# Patient Record
Sex: FEMALE | Race: WHITE | NOT HISPANIC OR LATINO | Employment: OTHER | URBAN - METROPOLITAN AREA
[De-identification: names, ages, dates, MRNs, and addresses within clinical notes are randomized per-mention and may not be internally consistent; named-entity substitution may affect disease eponyms.]

---

## 2017-01-09 ENCOUNTER — AMBULATORY - HEALTHEAST (OUTPATIENT)
Dept: CARDIOLOGY | Facility: CLINIC | Age: 67
End: 2017-01-09

## 2017-01-09 DIAGNOSIS — R40.20 LOSS OF CONSCIOUSNESS (H): ICD-10-CM

## 2017-01-09 DIAGNOSIS — I35.0 AORTIC STENOSIS: ICD-10-CM

## 2017-01-12 ENCOUNTER — COMMUNICATION - HEALTHEAST (OUTPATIENT)
Dept: NEUROSURGERY | Facility: CLINIC | Age: 67
End: 2017-01-12

## 2017-01-13 ENCOUNTER — COMMUNICATION - HEALTHEAST (OUTPATIENT)
Dept: NEUROSURGERY | Facility: CLINIC | Age: 67
End: 2017-01-13

## 2017-01-13 DIAGNOSIS — S32.001S LUMBAR BURST FRACTURE, SEQUELA: ICD-10-CM

## 2017-01-20 ENCOUNTER — RECORDS - HEALTHEAST (OUTPATIENT)
Dept: LAB | Facility: CLINIC | Age: 67
End: 2017-01-20

## 2017-01-23 LAB
CREAT SERPL-MCNC: 0.82 MG/DL (ref 0.6–1.1)
GFR SERPL CREATININE-BSD FRML MDRD: >60 ML/MIN/1.73M2

## 2017-01-24 ENCOUNTER — AMBULATORY - HEALTHEAST (OUTPATIENT)
Dept: NEUROSURGERY | Facility: CLINIC | Age: 67
End: 2017-01-24

## 2017-01-24 ENCOUNTER — HOSPITAL ENCOUNTER (OUTPATIENT)
Dept: RADIOLOGY | Facility: CLINIC | Age: 67
Discharge: HOME | End: 2017-01-24
Attending: NEUROLOGICAL SURGERY

## 2017-01-24 ENCOUNTER — OFFICE VISIT - HEALTHEAST (OUTPATIENT)
Dept: NEUROSURGERY | Facility: CLINIC | Age: 67
End: 2017-01-24

## 2017-01-24 DIAGNOSIS — S32.001S LUMBAR BURST FRACTURE, SEQUELA: ICD-10-CM

## 2017-01-24 DIAGNOSIS — S32.009A FRACTURE OF LUMBAR SPINE (H): ICD-10-CM

## 2017-02-09 ENCOUNTER — AMBULATORY - HEALTHEAST (OUTPATIENT)
Dept: NEUROSURGERY | Facility: CLINIC | Age: 67
End: 2017-02-09

## 2017-02-10 ENCOUNTER — HOSPITAL ENCOUNTER (OUTPATIENT)
Dept: CARDIOLOGY | Facility: CLINIC | Age: 67
Discharge: HOME | End: 2017-02-10
Attending: INTERNAL MEDICINE

## 2017-02-10 DIAGNOSIS — R40.20 LOSS OF CONSCIOUSNESS (H): ICD-10-CM

## 2017-02-10 DIAGNOSIS — I35.0 AORTIC STENOSIS: ICD-10-CM

## 2017-02-28 ENCOUNTER — HOSPITAL ENCOUNTER (OUTPATIENT)
Dept: RADIOLOGY | Facility: CLINIC | Age: 67
Discharge: HOME | End: 2017-02-28

## 2017-02-28 ENCOUNTER — OFFICE VISIT - HEALTHEAST (OUTPATIENT)
Dept: NEUROSURGERY | Facility: CLINIC | Age: 67
End: 2017-02-28

## 2017-02-28 DIAGNOSIS — S32.009A FRACTURE OF LUMBAR SPINE (H): ICD-10-CM

## 2017-02-28 DIAGNOSIS — S32.039A: ICD-10-CM

## 2017-02-28 RX ORDER — ACETAMINOPHEN 500 MG
500 TABLET ORAL EVERY 6 HOURS PRN
Status: SHIPPED | COMMUNITY
Start: 2017-02-28

## 2017-03-06 ENCOUNTER — RECORDS - HEALTHEAST (OUTPATIENT)
Dept: ADMINISTRATIVE | Facility: OTHER | Age: 67
End: 2017-03-06

## 2017-03-13 ENCOUNTER — AMBULATORY - HEALTHEAST (OUTPATIENT)
Dept: NEUROSURGERY | Facility: CLINIC | Age: 67
End: 2017-03-13

## 2017-03-16 ENCOUNTER — RECORDS - HEALTHEAST (OUTPATIENT)
Dept: BONE DENSITY | Facility: CLINIC | Age: 67
End: 2017-03-16

## 2017-03-16 ENCOUNTER — COMMUNICATION - HEALTHEAST (OUTPATIENT)
Dept: EMERGENCY MEDICINE | Facility: HOSPITAL | Age: 67
End: 2017-03-16

## 2017-03-16 DIAGNOSIS — M84.40XA PATHOLOGICAL FRACTURE, UNSPECIFIED SITE, INITIAL ENCOUNTER FOR FRACTURE: ICD-10-CM

## 2017-03-16 DIAGNOSIS — Z78.0 ASYMPTOMATIC MENOPAUSAL STATE: ICD-10-CM

## 2017-03-23 ENCOUNTER — COMMUNICATION - HEALTHEAST (OUTPATIENT)
Dept: NEUROSURGERY | Facility: CLINIC | Age: 67
End: 2017-03-23

## 2017-03-23 DIAGNOSIS — S22.000A THORACIC COMPRESSION FRACTURE, CLOSED, INITIAL ENCOUNTER (H): ICD-10-CM

## 2017-03-23 DIAGNOSIS — S32.001D LUMBAR BURST FRACTURE, WITH ROUTINE HEALING, SUBSEQUENT ENCOUNTER: ICD-10-CM

## 2017-03-24 RX ORDER — OXYCODONE HYDROCHLORIDE 5 MG/1
5 TABLET ORAL 2 TIMES DAILY PRN
Qty: 30 TABLET | Refills: 0 | Status: SHIPPED | OUTPATIENT
Start: 2017-03-24 | End: 2022-05-24

## 2017-03-30 ENCOUNTER — RECORDS - HEALTHEAST (OUTPATIENT)
Dept: ADMINISTRATIVE | Facility: OTHER | Age: 67
End: 2017-03-30

## 2017-04-11 ENCOUNTER — OFFICE VISIT - HEALTHEAST (OUTPATIENT)
Dept: NEUROSURGERY | Facility: CLINIC | Age: 67
End: 2017-04-11

## 2017-04-11 ENCOUNTER — HOSPITAL ENCOUNTER (OUTPATIENT)
Dept: RADIOLOGY | Facility: CLINIC | Age: 67
Discharge: HOME | End: 2017-04-11

## 2017-04-11 ENCOUNTER — AMBULATORY - HEALTHEAST (OUTPATIENT)
Dept: NEUROSURGERY | Facility: CLINIC | Age: 67
End: 2017-04-11

## 2017-04-11 DIAGNOSIS — S32.039A: ICD-10-CM

## 2017-04-13 ENCOUNTER — COMMUNICATION - HEALTHEAST (OUTPATIENT)
Dept: NEUROSURGERY | Facility: CLINIC | Age: 67
End: 2017-04-13

## 2017-04-20 ENCOUNTER — COMMUNICATION - HEALTHEAST (OUTPATIENT)
Dept: NEUROSURGERY | Facility: CLINIC | Age: 67
End: 2017-04-20

## 2017-04-21 ENCOUNTER — AMBULATORY - HEALTHEAST (OUTPATIENT)
Dept: NEUROSURGERY | Facility: CLINIC | Age: 67
End: 2017-04-21

## 2017-05-01 ENCOUNTER — OFFICE VISIT - HEALTHEAST (OUTPATIENT)
Dept: ENDOCRINOLOGY | Facility: CLINIC | Age: 67
End: 2017-05-01

## 2017-05-01 DIAGNOSIS — E11.9 DIABETES (H): ICD-10-CM

## 2017-05-01 LAB
CREAT SERPL-MCNC: 1.08 MG/DL (ref 0.6–1.1)
GFR SERPL CREATININE-BSD FRML MDRD: 51 ML/MIN/1.73M2
HBA1C MFR BLD: 13.2 % (ref 3.5–6)

## 2017-05-01 RX ORDER — ALENDRONATE SODIUM 70 MG/1
70 TABLET ORAL
Qty: 12 TABLET | Refills: 3 | Status: SHIPPED | OUTPATIENT
Start: 2017-05-01 | End: 2023-07-24

## 2017-05-01 ASSESSMENT — MIFFLIN-ST. JEOR: SCORE: 1092.42

## 2017-05-02 ENCOUNTER — COMMUNICATION - HEALTHEAST (OUTPATIENT)
Dept: NEUROSURGERY | Facility: CLINIC | Age: 67
End: 2017-05-02

## 2017-05-03 ENCOUNTER — COMMUNICATION - HEALTHEAST (OUTPATIENT)
Dept: ENDOCRINOLOGY | Facility: CLINIC | Age: 67
End: 2017-05-03

## 2017-05-03 ENCOUNTER — AMBULATORY - HEALTHEAST (OUTPATIENT)
Dept: NEUROSURGERY | Facility: CLINIC | Age: 67
End: 2017-05-03

## 2017-05-03 DIAGNOSIS — E11.9 DIABETES MELLITUS, TYPE II, INSULIN DEPENDENT (H): ICD-10-CM

## 2017-05-03 DIAGNOSIS — E03.9 HYPOTHYROIDISM, UNSPECIFIED: ICD-10-CM

## 2017-05-03 DIAGNOSIS — Z79.4 DIABETES MELLITUS, TYPE II, INSULIN DEPENDENT (H): ICD-10-CM

## 2017-05-03 RX ORDER — LEVOTHYROXINE SODIUM 137 UG/1
137 TABLET ORAL DAILY
Qty: 90 TABLET | Refills: 0 | Status: ON HOLD | OUTPATIENT
Start: 2017-05-03 | End: 2023-01-21

## 2017-05-10 ENCOUNTER — AMBULATORY - HEALTHEAST (OUTPATIENT)
Dept: EDUCATION SERVICES | Facility: CLINIC | Age: 67
End: 2017-05-10

## 2017-05-16 ENCOUNTER — OFFICE VISIT - HEALTHEAST (OUTPATIENT)
Dept: PHYSICAL THERAPY | Facility: CLINIC | Age: 67
End: 2017-05-16

## 2017-05-16 DIAGNOSIS — Z79.4 DIABETES MELLITUS, TYPE II, INSULIN DEPENDENT (H): ICD-10-CM

## 2017-05-16 DIAGNOSIS — S32.001D LUMBAR BURST FRACTURE, WITH ROUTINE HEALING, SUBSEQUENT ENCOUNTER: ICD-10-CM

## 2017-05-16 DIAGNOSIS — E16.2 HYPOGLYCEMIA: ICD-10-CM

## 2017-05-16 DIAGNOSIS — R40.20 LOSS OF CONSCIOUSNESS (H): ICD-10-CM

## 2017-05-16 DIAGNOSIS — M54.9 CHRONIC BACK PAIN GREATER THAN 3 MONTHS DURATION: ICD-10-CM

## 2017-05-16 DIAGNOSIS — N18.30 CHRONIC KIDNEY DISEASE, STAGE III (MODERATE) (H): ICD-10-CM

## 2017-05-16 DIAGNOSIS — E03.9 HYPOTHYROIDISM, UNSPECIFIED: ICD-10-CM

## 2017-05-16 DIAGNOSIS — S22.000S THORACIC COMPRESSION FRACTURE, SEQUELA: ICD-10-CM

## 2017-05-16 DIAGNOSIS — G89.29 CHRONIC BACK PAIN GREATER THAN 3 MONTHS DURATION: ICD-10-CM

## 2017-05-16 DIAGNOSIS — E11.9 DIABETES MELLITUS, TYPE II, INSULIN DEPENDENT (H): ICD-10-CM

## 2017-05-23 ENCOUNTER — OFFICE VISIT - HEALTHEAST (OUTPATIENT)
Dept: PHYSICAL THERAPY | Facility: CLINIC | Age: 67
End: 2017-05-23

## 2017-05-23 DIAGNOSIS — M54.9 CHRONIC BACK PAIN GREATER THAN 3 MONTHS DURATION: ICD-10-CM

## 2017-05-23 DIAGNOSIS — E11.9 DIABETES MELLITUS, TYPE II, INSULIN DEPENDENT (H): ICD-10-CM

## 2017-05-23 DIAGNOSIS — E16.2 HYPOGLYCEMIA: ICD-10-CM

## 2017-05-23 DIAGNOSIS — S22.000S THORACIC COMPRESSION FRACTURE, SEQUELA: ICD-10-CM

## 2017-05-23 DIAGNOSIS — G89.29 CHRONIC BACK PAIN GREATER THAN 3 MONTHS DURATION: ICD-10-CM

## 2017-05-23 DIAGNOSIS — E03.9 HYPOTHYROIDISM, UNSPECIFIED: ICD-10-CM

## 2017-05-23 DIAGNOSIS — Z79.4 DIABETES MELLITUS, TYPE II, INSULIN DEPENDENT (H): ICD-10-CM

## 2017-05-23 DIAGNOSIS — S32.001D LUMBAR BURST FRACTURE, WITH ROUTINE HEALING, SUBSEQUENT ENCOUNTER: ICD-10-CM

## 2017-05-23 DIAGNOSIS — R40.20 LOSS OF CONSCIOUSNESS (H): ICD-10-CM

## 2017-05-23 DIAGNOSIS — N18.30 CHRONIC KIDNEY DISEASE, STAGE III (MODERATE) (H): ICD-10-CM

## 2017-05-30 ENCOUNTER — OFFICE VISIT - HEALTHEAST (OUTPATIENT)
Dept: PHYSICAL THERAPY | Facility: CLINIC | Age: 67
End: 2017-05-30

## 2017-05-30 DIAGNOSIS — S32.001D LUMBAR BURST FRACTURE, WITH ROUTINE HEALING, SUBSEQUENT ENCOUNTER: ICD-10-CM

## 2017-05-30 DIAGNOSIS — Z79.4 DIABETES MELLITUS, TYPE II, INSULIN DEPENDENT (H): ICD-10-CM

## 2017-05-30 DIAGNOSIS — E11.9 DIABETES MELLITUS, TYPE II, INSULIN DEPENDENT (H): ICD-10-CM

## 2017-05-30 DIAGNOSIS — S22.000S THORACIC COMPRESSION FRACTURE, SEQUELA: ICD-10-CM

## 2017-05-30 DIAGNOSIS — M54.9 CHRONIC BACK PAIN GREATER THAN 3 MONTHS DURATION: ICD-10-CM

## 2017-05-30 DIAGNOSIS — E03.9 HYPOTHYROIDISM, UNSPECIFIED: ICD-10-CM

## 2017-05-30 DIAGNOSIS — R40.20 LOSS OF CONSCIOUSNESS (H): ICD-10-CM

## 2017-05-30 DIAGNOSIS — N18.30 CHRONIC KIDNEY DISEASE, STAGE III (MODERATE) (H): ICD-10-CM

## 2017-05-30 DIAGNOSIS — E16.2 HYPOGLYCEMIA: ICD-10-CM

## 2017-05-30 DIAGNOSIS — G89.29 CHRONIC BACK PAIN GREATER THAN 3 MONTHS DURATION: ICD-10-CM

## 2017-06-06 ENCOUNTER — OFFICE VISIT - HEALTHEAST (OUTPATIENT)
Dept: PHYSICAL THERAPY | Facility: CLINIC | Age: 67
End: 2017-06-06

## 2017-06-06 DIAGNOSIS — N18.30 CHRONIC KIDNEY DISEASE, STAGE III (MODERATE) (H): ICD-10-CM

## 2017-06-06 DIAGNOSIS — Z79.4 DIABETES MELLITUS, TYPE II, INSULIN DEPENDENT (H): ICD-10-CM

## 2017-06-06 DIAGNOSIS — E03.9 HYPOTHYROIDISM, UNSPECIFIED: ICD-10-CM

## 2017-06-06 DIAGNOSIS — M54.9 CHRONIC BACK PAIN GREATER THAN 3 MONTHS DURATION: ICD-10-CM

## 2017-06-06 DIAGNOSIS — E16.2 HYPOGLYCEMIA: ICD-10-CM

## 2017-06-06 DIAGNOSIS — S32.001D LUMBAR BURST FRACTURE, WITH ROUTINE HEALING, SUBSEQUENT ENCOUNTER: ICD-10-CM

## 2017-06-06 DIAGNOSIS — R40.20 LOSS OF CONSCIOUSNESS (H): ICD-10-CM

## 2017-06-06 DIAGNOSIS — S22.000S THORACIC COMPRESSION FRACTURE, SEQUELA: ICD-10-CM

## 2017-06-06 DIAGNOSIS — G89.29 CHRONIC BACK PAIN GREATER THAN 3 MONTHS DURATION: ICD-10-CM

## 2017-06-06 DIAGNOSIS — E11.9 DIABETES MELLITUS, TYPE II, INSULIN DEPENDENT (H): ICD-10-CM

## 2017-06-13 ENCOUNTER — OFFICE VISIT - HEALTHEAST (OUTPATIENT)
Dept: PHYSICAL THERAPY | Facility: CLINIC | Age: 67
End: 2017-06-13

## 2017-06-13 DIAGNOSIS — E03.9 HYPOTHYROIDISM, UNSPECIFIED: ICD-10-CM

## 2017-06-13 DIAGNOSIS — S22.000S THORACIC COMPRESSION FRACTURE, SEQUELA: ICD-10-CM

## 2017-06-13 DIAGNOSIS — E16.2 HYPOGLYCEMIA: ICD-10-CM

## 2017-06-13 DIAGNOSIS — N18.30 CHRONIC KIDNEY DISEASE, STAGE III (MODERATE) (H): ICD-10-CM

## 2017-06-13 DIAGNOSIS — R40.20 LOSS OF CONSCIOUSNESS (H): ICD-10-CM

## 2017-06-13 DIAGNOSIS — S32.001D LUMBAR BURST FRACTURE, WITH ROUTINE HEALING, SUBSEQUENT ENCOUNTER: ICD-10-CM

## 2017-06-13 DIAGNOSIS — M54.9 CHRONIC BACK PAIN GREATER THAN 3 MONTHS DURATION: ICD-10-CM

## 2017-06-13 DIAGNOSIS — Z79.4 DIABETES MELLITUS, TYPE II, INSULIN DEPENDENT (H): ICD-10-CM

## 2017-06-13 DIAGNOSIS — E11.9 DIABETES MELLITUS, TYPE II, INSULIN DEPENDENT (H): ICD-10-CM

## 2017-06-13 DIAGNOSIS — G89.29 CHRONIC BACK PAIN GREATER THAN 3 MONTHS DURATION: ICD-10-CM

## 2017-06-27 ENCOUNTER — OFFICE VISIT - HEALTHEAST (OUTPATIENT)
Dept: PHYSICAL THERAPY | Facility: CLINIC | Age: 67
End: 2017-06-27

## 2017-06-27 DIAGNOSIS — E03.9 HYPOTHYROIDISM, UNSPECIFIED: ICD-10-CM

## 2017-06-27 DIAGNOSIS — N18.30 CHRONIC KIDNEY DISEASE, STAGE III (MODERATE) (H): ICD-10-CM

## 2017-06-27 DIAGNOSIS — Z79.4 DIABETES MELLITUS, TYPE II, INSULIN DEPENDENT (H): ICD-10-CM

## 2017-06-27 DIAGNOSIS — R40.20 LOSS OF CONSCIOUSNESS (H): ICD-10-CM

## 2017-06-27 DIAGNOSIS — M54.9 CHRONIC BACK PAIN GREATER THAN 3 MONTHS DURATION: ICD-10-CM

## 2017-06-27 DIAGNOSIS — S22.000S THORACIC COMPRESSION FRACTURE, SEQUELA: ICD-10-CM

## 2017-06-27 DIAGNOSIS — S32.001D LUMBAR BURST FRACTURE, WITH ROUTINE HEALING, SUBSEQUENT ENCOUNTER: ICD-10-CM

## 2017-06-27 DIAGNOSIS — E11.9 DIABETES MELLITUS, TYPE II, INSULIN DEPENDENT (H): ICD-10-CM

## 2017-06-27 DIAGNOSIS — E16.2 HYPOGLYCEMIA: ICD-10-CM

## 2017-06-27 DIAGNOSIS — G89.29 CHRONIC BACK PAIN GREATER THAN 3 MONTHS DURATION: ICD-10-CM

## 2017-07-05 ENCOUNTER — OFFICE VISIT - HEALTHEAST (OUTPATIENT)
Dept: PHYSICAL THERAPY | Facility: REHABILITATION | Age: 67
End: 2017-07-05

## 2017-07-05 DIAGNOSIS — S32.001D LUMBAR BURST FRACTURE, WITH ROUTINE HEALING, SUBSEQUENT ENCOUNTER: ICD-10-CM

## 2017-07-05 DIAGNOSIS — E03.9 HYPOTHYROIDISM, UNSPECIFIED: ICD-10-CM

## 2017-07-05 DIAGNOSIS — Z79.4 DIABETES MELLITUS, TYPE II, INSULIN DEPENDENT (H): ICD-10-CM

## 2017-07-05 DIAGNOSIS — G89.29 CHRONIC BACK PAIN GREATER THAN 3 MONTHS DURATION: ICD-10-CM

## 2017-07-05 DIAGNOSIS — M54.9 CHRONIC BACK PAIN GREATER THAN 3 MONTHS DURATION: ICD-10-CM

## 2017-07-05 DIAGNOSIS — E16.2 HYPOGLYCEMIA: ICD-10-CM

## 2017-07-05 DIAGNOSIS — S22.000S THORACIC COMPRESSION FRACTURE, SEQUELA: ICD-10-CM

## 2017-07-05 DIAGNOSIS — N18.30 CHRONIC KIDNEY DISEASE, STAGE III (MODERATE) (H): ICD-10-CM

## 2017-07-05 DIAGNOSIS — E11.9 DIABETES MELLITUS, TYPE II, INSULIN DEPENDENT (H): ICD-10-CM

## 2017-07-05 DIAGNOSIS — R40.20 LOSS OF CONSCIOUSNESS (H): ICD-10-CM

## 2017-07-12 ENCOUNTER — OFFICE VISIT - HEALTHEAST (OUTPATIENT)
Dept: PHYSICAL THERAPY | Facility: REHABILITATION | Age: 67
End: 2017-07-12

## 2017-07-12 DIAGNOSIS — R40.20 LOSS OF CONSCIOUSNESS (H): ICD-10-CM

## 2017-07-12 DIAGNOSIS — E03.9 HYPOTHYROIDISM, UNSPECIFIED: ICD-10-CM

## 2017-07-12 DIAGNOSIS — E11.9 DIABETES MELLITUS, TYPE II, INSULIN DEPENDENT (H): ICD-10-CM

## 2017-07-12 DIAGNOSIS — M54.9 CHRONIC BACK PAIN GREATER THAN 3 MONTHS DURATION: ICD-10-CM

## 2017-07-12 DIAGNOSIS — Z79.4 DIABETES MELLITUS, TYPE II, INSULIN DEPENDENT (H): ICD-10-CM

## 2017-07-12 DIAGNOSIS — E16.2 HYPOGLYCEMIA: ICD-10-CM

## 2017-07-12 DIAGNOSIS — S22.000S THORACIC COMPRESSION FRACTURE, SEQUELA: ICD-10-CM

## 2017-07-12 DIAGNOSIS — N18.30 CHRONIC KIDNEY DISEASE, STAGE III (MODERATE) (H): ICD-10-CM

## 2017-07-12 DIAGNOSIS — S32.001D LUMBAR BURST FRACTURE, WITH ROUTINE HEALING, SUBSEQUENT ENCOUNTER: ICD-10-CM

## 2017-07-12 DIAGNOSIS — G89.29 CHRONIC BACK PAIN GREATER THAN 3 MONTHS DURATION: ICD-10-CM

## 2017-07-19 ENCOUNTER — OFFICE VISIT - HEALTHEAST (OUTPATIENT)
Dept: PHYSICAL THERAPY | Facility: REHABILITATION | Age: 67
End: 2017-07-19

## 2017-07-19 DIAGNOSIS — R40.20 LOSS OF CONSCIOUSNESS (H): ICD-10-CM

## 2017-07-19 DIAGNOSIS — I10 HYPERTENSION, BENIGN ESSENTIAL, GOAL BELOW 140/90: ICD-10-CM

## 2017-07-19 DIAGNOSIS — Z86.73 HISTORY OF STROKE: ICD-10-CM

## 2017-07-19 DIAGNOSIS — E78.2 MULTIPLE-TYPE HYPERLIPIDEMIA: ICD-10-CM

## 2017-07-19 DIAGNOSIS — M54.9 CHRONIC BACK PAIN GREATER THAN 3 MONTHS DURATION: ICD-10-CM

## 2017-07-19 DIAGNOSIS — E03.9 HYPOTHYROIDISM, UNSPECIFIED: ICD-10-CM

## 2017-07-19 DIAGNOSIS — E16.2 HYPOGLYCEMIA: ICD-10-CM

## 2017-07-19 DIAGNOSIS — I10 ESSENTIAL HYPERTENSION: ICD-10-CM

## 2017-07-19 DIAGNOSIS — N18.30 CHRONIC KIDNEY DISEASE, STAGE III (MODERATE) (H): ICD-10-CM

## 2017-07-19 DIAGNOSIS — G89.29 CHRONIC BACK PAIN GREATER THAN 3 MONTHS DURATION: ICD-10-CM

## 2017-07-19 DIAGNOSIS — E11.9 DIABETES MELLITUS, TYPE II, INSULIN DEPENDENT (H): ICD-10-CM

## 2017-07-19 DIAGNOSIS — F32.A DEPRESSION: ICD-10-CM

## 2017-07-19 DIAGNOSIS — S22.000S THORACIC COMPRESSION FRACTURE, SEQUELA: ICD-10-CM

## 2017-07-19 DIAGNOSIS — S32.001D LUMBAR BURST FRACTURE, WITH ROUTINE HEALING, SUBSEQUENT ENCOUNTER: ICD-10-CM

## 2017-07-19 DIAGNOSIS — Z79.4 DIABETES MELLITUS, TYPE II, INSULIN DEPENDENT (H): ICD-10-CM

## 2017-07-19 DIAGNOSIS — I35.0 AORTIC STENOSIS, MODERATE: ICD-10-CM

## 2017-07-26 ENCOUNTER — OFFICE VISIT - HEALTHEAST (OUTPATIENT)
Dept: PHYSICAL THERAPY | Facility: REHABILITATION | Age: 67
End: 2017-07-26

## 2017-07-26 DIAGNOSIS — I35.0 AORTIC STENOSIS, MODERATE: ICD-10-CM

## 2017-07-26 DIAGNOSIS — F32.A DEPRESSION: ICD-10-CM

## 2017-07-26 DIAGNOSIS — S32.001D LUMBAR BURST FRACTURE, WITH ROUTINE HEALING, SUBSEQUENT ENCOUNTER: ICD-10-CM

## 2017-07-26 DIAGNOSIS — I10 ESSENTIAL HYPERTENSION: ICD-10-CM

## 2017-07-26 DIAGNOSIS — G89.29 CHRONIC BACK PAIN GREATER THAN 3 MONTHS DURATION: ICD-10-CM

## 2017-07-26 DIAGNOSIS — M54.9 CHRONIC BACK PAIN GREATER THAN 3 MONTHS DURATION: ICD-10-CM

## 2017-07-26 DIAGNOSIS — R40.20 LOSS OF CONSCIOUSNESS (H): ICD-10-CM

## 2017-07-26 DIAGNOSIS — E03.9 HYPOTHYROIDISM, UNSPECIFIED: ICD-10-CM

## 2017-07-26 DIAGNOSIS — E11.9 DIABETES MELLITUS, TYPE II, INSULIN DEPENDENT (H): ICD-10-CM

## 2017-07-26 DIAGNOSIS — S22.000S THORACIC COMPRESSION FRACTURE, SEQUELA: ICD-10-CM

## 2017-07-26 DIAGNOSIS — Z79.4 DIABETES MELLITUS, TYPE II, INSULIN DEPENDENT (H): ICD-10-CM

## 2017-07-26 DIAGNOSIS — Z86.73 HISTORY OF STROKE: ICD-10-CM

## 2017-07-26 DIAGNOSIS — N18.30 CHRONIC KIDNEY DISEASE, STAGE III (MODERATE) (H): ICD-10-CM

## 2018-01-06 ENCOUNTER — RECORDS - HEALTHEAST (OUTPATIENT)
Dept: ADMINISTRATIVE | Facility: OTHER | Age: 68
End: 2018-01-06

## 2018-01-09 ENCOUNTER — RECORDS - HEALTHEAST (OUTPATIENT)
Dept: ADMINISTRATIVE | Facility: OTHER | Age: 68
End: 2018-01-09

## 2018-02-06 ENCOUNTER — AMBULATORY - HEALTHEAST (OUTPATIENT)
Dept: CARDIAC REHAB | Facility: HOSPITAL | Age: 68
End: 2018-02-06

## 2018-02-06 DIAGNOSIS — Z95.2 S/P AVR (AORTIC VALVE REPLACEMENT): ICD-10-CM

## 2018-02-19 ENCOUNTER — AMBULATORY - HEALTHEAST (OUTPATIENT)
Dept: CARDIAC REHAB | Facility: HOSPITAL | Age: 68
End: 2018-02-19

## 2018-02-19 DIAGNOSIS — Z95.2 S/P AVR (AORTIC VALVE REPLACEMENT): ICD-10-CM

## 2018-02-19 LAB — GLUCOSE BLDC GLUCOMTR-MCNC: 191 MG/DL

## 2018-02-19 RX ORDER — LIDOCAINE 50 MG/G
1 PATCH TOPICAL EVERY 24 HOURS
Status: SHIPPED | COMMUNITY
Start: 2018-02-19 | End: 2023-01-19

## 2018-02-19 RX ORDER — POLYETHYLENE GLYCOL 3350 17 G/17G
17 POWDER, FOR SOLUTION ORAL DAILY
Status: SHIPPED | COMMUNITY
Start: 2018-02-19 | End: 2023-01-19

## 2018-02-19 RX ORDER — FUROSEMIDE 40 MG
20 TABLET ORAL DAILY
Status: SHIPPED | COMMUNITY
Start: 2018-02-19 | End: 2023-01-27

## 2018-02-19 RX ORDER — METOPROLOL TARTRATE 25 MG/1
25 TABLET, FILM COATED ORAL 2 TIMES DAILY
Status: SHIPPED | COMMUNITY
Start: 2018-02-19 | End: 2023-01-19

## 2018-02-19 RX ORDER — POTASSIUM CHLORIDE 1500 MG/1
20 TABLET, EXTENDED RELEASE ORAL 2 TIMES DAILY
Status: SHIPPED | COMMUNITY
Start: 2018-02-19 | End: 2023-01-19

## 2018-02-19 RX ORDER — ALBUTEROL SULFATE 90 UG/1
2 AEROSOL, METERED RESPIRATORY (INHALATION) EVERY 6 HOURS PRN
Status: SHIPPED | COMMUNITY
Start: 2018-02-19 | End: 2023-07-24

## 2018-02-21 ENCOUNTER — AMBULATORY - HEALTHEAST (OUTPATIENT)
Dept: CARDIAC REHAB | Facility: HOSPITAL | Age: 68
End: 2018-02-21

## 2018-02-21 DIAGNOSIS — Z95.2 S/P AVR (AORTIC VALVE REPLACEMENT): ICD-10-CM

## 2018-02-21 RX ORDER — MIRTAZAPINE 15 MG/1
15 TABLET, FILM COATED ORAL AT BEDTIME
Status: SHIPPED | COMMUNITY
Start: 2018-02-21 | End: 2023-01-19

## 2018-02-26 ENCOUNTER — AMBULATORY - HEALTHEAST (OUTPATIENT)
Dept: CARDIAC REHAB | Facility: HOSPITAL | Age: 68
End: 2018-02-26

## 2018-02-26 DIAGNOSIS — Z95.2 S/P AVR (AORTIC VALVE REPLACEMENT): ICD-10-CM

## 2018-02-26 LAB — GLUCOSE BLDC GLUCOMTR-MCNC: 157 MG/DL

## 2018-02-28 ENCOUNTER — AMBULATORY - HEALTHEAST (OUTPATIENT)
Dept: CARDIAC REHAB | Facility: HOSPITAL | Age: 68
End: 2018-02-28

## 2018-02-28 DIAGNOSIS — Z95.2 S/P AVR (AORTIC VALVE REPLACEMENT): ICD-10-CM

## 2018-03-07 ENCOUNTER — AMBULATORY - HEALTHEAST (OUTPATIENT)
Dept: CARDIAC REHAB | Facility: HOSPITAL | Age: 68
End: 2018-03-07

## 2018-03-07 DIAGNOSIS — Z95.2 S/P AVR (AORTIC VALVE REPLACEMENT): ICD-10-CM

## 2018-03-12 ENCOUNTER — AMBULATORY - HEALTHEAST (OUTPATIENT)
Dept: CARDIAC REHAB | Facility: HOSPITAL | Age: 68
End: 2018-03-12

## 2018-03-12 DIAGNOSIS — Z95.2 S/P AVR (AORTIC VALVE REPLACEMENT): ICD-10-CM

## 2018-03-14 ENCOUNTER — AMBULATORY - HEALTHEAST (OUTPATIENT)
Dept: CARDIAC REHAB | Facility: HOSPITAL | Age: 68
End: 2018-03-14

## 2018-03-14 DIAGNOSIS — Z95.2 S/P AVR (AORTIC VALVE REPLACEMENT): ICD-10-CM

## 2018-03-19 ENCOUNTER — AMBULATORY - HEALTHEAST (OUTPATIENT)
Dept: CARDIAC REHAB | Facility: HOSPITAL | Age: 68
End: 2018-03-19

## 2018-03-19 DIAGNOSIS — Z95.2 S/P AVR (AORTIC VALVE REPLACEMENT): ICD-10-CM

## 2018-03-21 ENCOUNTER — AMBULATORY - HEALTHEAST (OUTPATIENT)
Dept: CARDIAC REHAB | Facility: HOSPITAL | Age: 68
End: 2018-03-21

## 2018-03-21 DIAGNOSIS — Z95.2 S/P AVR (AORTIC VALVE REPLACEMENT): ICD-10-CM

## 2018-03-26 ENCOUNTER — AMBULATORY - HEALTHEAST (OUTPATIENT)
Dept: CARDIAC REHAB | Facility: HOSPITAL | Age: 68
End: 2018-03-26

## 2018-03-26 DIAGNOSIS — Z95.2 S/P AVR (AORTIC VALVE REPLACEMENT): ICD-10-CM

## 2018-03-28 ENCOUNTER — AMBULATORY - HEALTHEAST (OUTPATIENT)
Dept: CARDIAC REHAB | Facility: HOSPITAL | Age: 68
End: 2018-03-28

## 2018-03-28 DIAGNOSIS — Z95.2 S/P AVR (AORTIC VALVE REPLACEMENT): ICD-10-CM

## 2018-04-02 ENCOUNTER — AMBULATORY - HEALTHEAST (OUTPATIENT)
Dept: CARDIAC REHAB | Facility: HOSPITAL | Age: 68
End: 2018-04-02

## 2018-04-02 DIAGNOSIS — Z95.2 S/P AVR (AORTIC VALVE REPLACEMENT): ICD-10-CM

## 2018-04-18 ENCOUNTER — AMBULATORY - HEALTHEAST (OUTPATIENT)
Dept: CARDIAC REHAB | Facility: HOSPITAL | Age: 68
End: 2018-04-18

## 2018-04-18 DIAGNOSIS — Z95.2 S/P AVR (AORTIC VALVE REPLACEMENT): ICD-10-CM

## 2018-04-23 ENCOUNTER — AMBULATORY - HEALTHEAST (OUTPATIENT)
Dept: CARDIAC REHAB | Facility: HOSPITAL | Age: 68
End: 2018-04-23

## 2018-04-23 DIAGNOSIS — Z95.2 S/P AVR (AORTIC VALVE REPLACEMENT): ICD-10-CM

## 2018-04-25 ENCOUNTER — AMBULATORY - HEALTHEAST (OUTPATIENT)
Dept: CARDIAC REHAB | Facility: HOSPITAL | Age: 68
End: 2018-04-25

## 2018-04-25 DIAGNOSIS — Z95.2 S/P AVR (AORTIC VALVE REPLACEMENT): ICD-10-CM

## 2018-04-30 ENCOUNTER — AMBULATORY - HEALTHEAST (OUTPATIENT)
Dept: CARDIAC REHAB | Facility: HOSPITAL | Age: 68
End: 2018-04-30

## 2018-04-30 DIAGNOSIS — Z95.2 S/P AVR (AORTIC VALVE REPLACEMENT): ICD-10-CM

## 2018-05-02 ENCOUNTER — AMBULATORY - HEALTHEAST (OUTPATIENT)
Dept: CARDIAC REHAB | Facility: HOSPITAL | Age: 68
End: 2018-05-02

## 2018-05-02 DIAGNOSIS — Z95.2 S/P AVR (AORTIC VALVE REPLACEMENT): ICD-10-CM

## 2018-05-07 ENCOUNTER — AMBULATORY - HEALTHEAST (OUTPATIENT)
Dept: CARDIAC REHAB | Facility: HOSPITAL | Age: 68
End: 2018-05-07

## 2018-05-07 DIAGNOSIS — Z95.2 S/P AVR (AORTIC VALVE REPLACEMENT): ICD-10-CM

## 2018-05-09 ENCOUNTER — HOSPITAL ENCOUNTER (OUTPATIENT)
Dept: MAMMOGRAPHY | Facility: CLINIC | Age: 68
Discharge: HOME | End: 2018-05-09

## 2018-05-09 ENCOUNTER — AMBULATORY - HEALTHEAST (OUTPATIENT)
Dept: CARDIAC REHAB | Facility: HOSPITAL | Age: 68
End: 2018-05-09

## 2018-05-09 DIAGNOSIS — Z12.31 VISIT FOR SCREENING MAMMOGRAM: ICD-10-CM

## 2018-05-09 DIAGNOSIS — Z95.2 S/P AVR (AORTIC VALVE REPLACEMENT): ICD-10-CM

## 2020-11-19 ENCOUNTER — COMMUNICATION - HEALTHEAST (OUTPATIENT)
Dept: SCHEDULING | Facility: CLINIC | Age: 70
End: 2020-11-19

## 2021-05-13 ENCOUNTER — HOSPITAL ENCOUNTER (OUTPATIENT)
Dept: MAMMOGRAPHY | Facility: CLINIC | Age: 71
Discharge: HOME | End: 2021-05-13

## 2021-05-13 DIAGNOSIS — Z12.31 VISIT FOR SCREENING MAMMOGRAM: ICD-10-CM

## 2021-05-26 ENCOUNTER — RECORDS - HEALTHEAST (OUTPATIENT)
Dept: ADMINISTRATIVE | Facility: CLINIC | Age: 71
End: 2021-05-26

## 2021-05-28 ENCOUNTER — RECORDS - HEALTHEAST (OUTPATIENT)
Dept: ADMINISTRATIVE | Facility: CLINIC | Age: 71
End: 2021-05-28

## 2021-05-29 ENCOUNTER — RECORDS - HEALTHEAST (OUTPATIENT)
Dept: ADMINISTRATIVE | Facility: CLINIC | Age: 71
End: 2021-05-29

## 2021-05-30 ENCOUNTER — RECORDS - HEALTHEAST (OUTPATIENT)
Dept: ADMINISTRATIVE | Facility: CLINIC | Age: 71
End: 2021-05-30

## 2021-05-30 VITALS — WEIGHT: 140 LBS | HEIGHT: 61 IN | BODY MASS INDEX: 26.43 KG/M2

## 2021-05-31 ENCOUNTER — RECORDS - HEALTHEAST (OUTPATIENT)
Dept: ADMINISTRATIVE | Facility: CLINIC | Age: 71
End: 2021-05-31

## 2021-06-01 ENCOUNTER — RECORDS - HEALTHEAST (OUTPATIENT)
Dept: ADMINISTRATIVE | Facility: CLINIC | Age: 71
End: 2021-06-01

## 2021-06-01 VITALS — WEIGHT: 152 LBS | BODY MASS INDEX: 28.72 KG/M2

## 2021-06-01 VITALS — BODY MASS INDEX: 29.66 KG/M2 | WEIGHT: 157 LBS

## 2021-06-01 VITALS — BODY MASS INDEX: 29.1 KG/M2 | WEIGHT: 154 LBS

## 2021-06-01 VITALS — BODY MASS INDEX: 29.48 KG/M2 | WEIGHT: 156 LBS

## 2021-06-01 VITALS — BODY MASS INDEX: 29.85 KG/M2 | WEIGHT: 158 LBS

## 2021-06-01 VITALS — BODY MASS INDEX: 29.29 KG/M2 | WEIGHT: 155 LBS

## 2021-06-01 VITALS — BODY MASS INDEX: 28.91 KG/M2 | WEIGHT: 153 LBS

## 2021-06-01 VITALS — WEIGHT: 151 LBS | BODY MASS INDEX: 28.53 KG/M2

## 2021-06-01 VITALS — WEIGHT: 154 LBS | BODY MASS INDEX: 29.1 KG/M2

## 2021-06-01 VITALS — BODY MASS INDEX: 28.53 KG/M2 | WEIGHT: 151 LBS

## 2021-06-02 ENCOUNTER — RECORDS - HEALTHEAST (OUTPATIENT)
Dept: ADMINISTRATIVE | Facility: CLINIC | Age: 71
End: 2021-06-02

## 2021-06-03 ENCOUNTER — RECORDS - HEALTHEAST (OUTPATIENT)
Dept: ADMINISTRATIVE | Facility: CLINIC | Age: 71
End: 2021-06-03
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
## 2021-06-10 NOTE — PROGRESS NOTES
[facility] Daily Progress     Patient Name: Nichole Titus  Date: 5/23/2017  Visit #: 2/12  Referral Diagnosis: Fracture of L3 Vertebra  Referring provider: Kay Garcia CNP  Visit Diagnosis:     ICD-10-CM    1. Lumbar burst fracture, with routine healing, subsequent encounter S32.001D    2. Thoracic compression fracture, sequela S22.000S    3. Diabetes mellitus, type II, insulin dependent E11.9     Z79.4    4. Hypothyroidism, unspecified E03.9    5. Chronic kidney disease, stage III (moderate) N18.3    6. Loss of consciousness due to hypoglycemia (glucose 46 per EMT) R40.20    7. Chronic back pain greater than 3 months duration M54.9     G89.29    8. Hypoglycemia E16.2          Assessment:     Pt overall worse this past week and is unable as a diabetic and having steroid allergies to use Prednisone. I have asked her to decrease rib motion during breathing exercises and to talk with doctor about an antiinflammatory.  Based on history, the most likely therapy diagnosis is related to left hip muscle and bursal pain, especially given the fact she has had no injury and has a multilevel home. She also seems to be very fluid fiilled in her chest and abdomen and I have some concern for pneumonia, which she has had in the past and she made a statement this is what it feels like to her.    Goal Status:  Pt. will have improved quality of sleep: waking less times/night;in 12 weeks;Comment  Comment:: waking 3x.night or less  Pt. will be able to walk : 30 minutes;for exercise/recreation;in 12 weeks;Comment  Comment:: with less than 5/10 back pain  Patient will sit: 30 minutes;for driving;for other activity;in 12 weeks;Comment  Other Activity: with less than 5/10 back pain  Patient will transfer: sit/stand;supine/sit;in 12 weeks;Comment  Comment: with less than 5/10 back pain    Pain is a 10/10 in her ribs and abdomen, and left hip and back- no falls    Plan / Patient Education:     Pt reports if she doesn't get  relief she feels she may have to go into the ER, and this is a good option in her current condition.  We will continue t work on transfers, self management , exercises, and manual therapy    Subjective:     Pain Rating: 10       I saw the doctor yesterday, and they took a lot of xray's of the LB and left hip. I hurt so much I can't sleep, sit, stand.    I have been having trouble breathing for the past week, and pain radiates to the chest . I have to use a pillow to compress my chest when I move. Now I have pain in both buttocks which goes down left posterior thigh to the knee and is intermittent pain not tingling.  Muscle relaxants( Flexoril) didn't work, and now they have me on Gabapentin( 100 mg- 3x/day) and Oxycodone.    I don't even clean my house, I am not lifting more than 5#.        Objective:   PSIS level in supine, but much pelvic area swelling.  Marked left hip pain with even supine hip flexion to 90.  No increased pain with pelvic compression or distraction        Treatment Today     TREATMENT MINUTES COMMENTS   Evaluation     Self-care/ Home management 40 Discussed her current condition and modifications: trying to conserve energy and avoid stairs, how to use hands to roll and avoid any trunk rotation moving from sit to stand (this is extremely painful), discussing changing diaphragm exercise to decrease rib excursion and change to 2 reps, 3x/day. Did express concern for signs of difficulty breathing and to talk with her doctor about this, as well as possibly antiinflammatories to manage pain level. We did also talk about positioning to 90/90 off piriformis and gluteals, and relaxing muscles when supported to allow better self healing and circulation.   Manual therapy 15 Andrea DEC-LV- very extended   Neuromuscular Re-education     Therapeutic Activity     Therapeutic Exercises     Gait training     Modality__________________                Total 60    Blank areas are intentional and mean the treatment  did not include these items.       Cristina Almaraz  5/23/2017
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
## 2021-06-10 NOTE — PROGRESS NOTES
[facility] Daily Progress     Patient Name: Nichole Titus  Date: 2017  Visit #: 3/12  Referral Diagnosis: Fracture of L3 Vertebra  Referring provider: Velvet Mckay MD  Visit Diagnosis:     ICD-10-CM    1. Lumbar burst fracture, with routine healing, subsequent encounter S32.001D    2. Thoracic compression fracture, sequela S22.000S    3. Diabetes mellitus, type II, insulin dependent E11.9     Z79.4    4. Hypothyroidism, unspecified E03.9    5. Chronic kidney disease, stage III (moderate) N18.3    6. Loss of consciousness due to hypoglycemia (glucose 46 per EMT) R40.20    7. Chronic back pain greater than 3 months duration M54.9     G89.29    8. Hypoglycemia E16.2          Assessment:     Significant pain in left back, pelvis and into left LE but per patient all films show good healing ob her bone including the fracture. Transitions from different levels are the most painful, but overall patient reports she is moving a little better than last week. She is noted to be moving faster and with less guarding sit to stand and when walking.    Goal Status:  Pt. will have improved quality of sleep: waking less times/night;in 12 weeks;Comment  Comment:: waking 3x.night or less  Pt. will be able to walk : 30 minutes;for exercise/recreation;in 12 weeks;Comment  Comment:: with less than 5/10 back pain  Patient will sit: 30 minutes;for driving;for other activity;in 12 weeks;Comment  Other Activity: with less than 5/10 back pain  Patient will transfer: sit/stand;supine/sit;in 12 weeks;Comment  Comment: with less than 5/10 back pain     10/10 when transferring from bed in side lie to upright      Plan / Patient Education:   Try TENS unit, continue MT/SCS  Continue with initial plan of care.  Progress with home program as tolerated.    Subjective:     Pain Ratin  My pain got so bad I had to go into the ER.17 and stayed overnight. I received 2 doses of morphine via IV, and I got down to 6/10. I am still on  Gabapentin 100mg, and I am off Oxycodone.  I saw the specialist, and talked about the possibility of spine injections.      Objective:     Pain radiates down left LB, left gluteal, left groin and left lateral thigh. She reports she can get tingling down the outer calf into the foot in certain positions - even when laying down.   Left pelvis /lower abdomen and left lateral thigh feels mild edema.    Treatment Today     TREATMENT MINUTES COMMENTS   Evaluation     Self-care/ Home management     Manual therapy 40 Right side Lie MT/SCS to left QL, thoracolumbar paraspinal, left gluteals, and left lat thigh. ILLU-A, DCIL-A, RAD-A multiple lumbar levels, SGL5, SGL4-A, EPIS-LV 1-12. EPIL-LV, 3-4, IIL-LV   Neuromuscular Re-education     Therapeutic Activity     Therapeutic Exercises 15 Instructed patient in 4-7-8 breathing, and she is to continue light walking and exercises already issued. She has not yet done pressups since getting out of hospital   Gait training     Modality__________________                Total 60    Blank areas are intentional and mean the treatment did not include these items.       Cristina Almaraz  5/30/2017

## 2021-06-11 NOTE — PROGRESS NOTES
[facility] Daily Progress     Patient Name: Nichole Titus  Date: 2017  Visit #:   Referral Diagnosis: Fracture of L3 Vertebra  Referring provider: Velvet Mckay MD  Visit Diagnosis:     ICD-10-CM    1. Lumbar burst fracture, with routine healing, subsequent encounter S32.001D    2. Thoracic compression fracture, sequela S22.000S    3. Diabetes mellitus, type II, insulin dependent E11.9     Z79.4    4. Hypothyroidism, unspecified E03.9    5. Chronic kidney disease, stage III (moderate) N18.3    6. Loss of consciousness due to hypoglycemia (glucose 46 per EMT) R40.20    7. Chronic back pain greater than 3 months duration M54.9     G89.29    8. Hypoglycemia E16.2          Assessment:     Pt was able to lay prone with one pillow for today's treatment and this allowed the least pain during plinth to stand transfers (pain went down with rx to 8/10 and stayed at 8/10). My suspicion is that her fascial tissues are causing a lot of her pain in the lumbosacral region.    Goal Status:  Pt. will have improved quality of sleep: waking less times/night;in 12 weeks;Comment  Comment:: waking 3x.night or less  Pt. will be able to walk : 30 minutes;for exercise/recreation;in 12 weeks;Comment  Comment:: with less than 5/10 back pain  Patient will sit: 30 minutes;for driving;for other activity;in 12 weeks;Comment  Other Activity: with less than 5/10 back pain  Patient will transfer: sit/stand;supine/sit;in 12 weeks;Comment  Comment: with less than 5/10 back pain    Pt is walking around as able, doing frequent sit to stand, and doing her breathing exercises.    Plan / Patient Education:   Continue prone MT, consider no pillow next session- prone lie HEP?  Continue with initial plan of care.  Progress with home program as tolerated.    Subjective:     Pain Ratin  I was up and down and moving around    Objective:     PSIS level  Extreme lack of thoracic and lumbar PA mobility throughout the spine bilat  Mild thoracic  kyphosis and no arm swing when walking    Treatment Today     TREATMENT MINUTES COMMENTS   Evaluation     Self-care/ Home management     Manual therapy 55 Prone MT/SCS to thoracolumbar area- HAXY, AZY-LV, RAD-A stacked bilat, EPIS1-4 bilat Right DFEM-A, ILLU-A bilat   Neuromuscular Re-education     Therapeutic Activity     Therapeutic Exercises Nc-5 Added shoulder reach bilat to celing several times per day for trunk SB and rib expansion   Gait training     Modality__________________                Total 60    Blank areas are intentional and mean the treatment did not include these items.       Cristina Almaraz  6/6/2017

## 2021-06-11 NOTE — PROGRESS NOTES
[facility] Daily Progress     Patient Name: Nichole Titus  Date: 7/19/2017  Visit #: 9/12  Referral Diagnosis: Fracture of L3 Vertebra, left rib fractures  Referring provider: Melvina Garcia CNP  Visit Diagnosis:     ICD-10-CM    1. Lumbar burst fracture, with routine healing, subsequent encounter S32.001D    2. Thoracic compression fracture, sequela S22.000S    3. Diabetes mellitus, type II, insulin dependent E11.9     Z79.4    4. Hypothyroidism, unspecified E03.9    5. Chronic kidney disease, stage III (moderate) N18.3    6. Loss of consciousness due to hypoglycemia (glucose 46 per EMT) R40.20    7. Chronic back pain greater than 3 months duration M54.9     G89.29    8. Hypoglycemia E16.2          Assessment:   Patient is making progress with bed mobility and gait, but still has significant back pain affecting ADL's. PT has been helpful, but our new plan discussed today is to see patient every 3-4 weeks over the summer in order to modify and progress her program and assess functional progress.  Patient demonstrates understanding/independence with home program.  Patient is appropriate to continue with skilled physical therapy intervention, as indicated by initial plan of care.    Goal Status:  Pt. will have improved quality of sleep: waking less times/night;in 12 weeks;Comment  Comment:: waking 3x.night or less  Pt. will be able to walk : 30 minutes;for exercise/recreation;in 12 weeks;Comment  Comment:: with less than 5/10 back pain  Patient will sit: 30 minutes;for driving;for other activity;in 12 weeks;Comment  Other Activity: with less than 5/10 back pain  Patient will transfer: sit/stand;supine/sit;in 12 weeks;Comment  Comment: with less than 5/10 back pain      Plan / Patient Education:    See patient next week to issue quad and hip abd strengthening, and then decrease frequency to every 3-4 weeks.  Continue with initial plan of care.  Progress with home program as tolerated.    Subjective:      Pain Ratin  I had an echocardiogram done in  and they want me to come back due to an abnormality - I have a heart murmur.  Back pain is slightly variable, but the lowest it goes is a 7/10 some days      Objective:     Walking is extremely slow and guarded, definite right leg limp but walks 100 feet without device. She is wearing the back brace, and does not wear the brace in the house.    Pt has difficulty rolling and getting onto her stomach at home, so prone exercises are not yet possible.    Exercises modified due to right upper arm and shoulder pain. This pain has been present about weeks, and is most painful with reaching.    Reviewed orange band rows and shoulder ER to 90, and patient had been doing ER while facing door essentially in full ER/slight ABD which likely caused the right shoulder pain due to RC  Impingement. Technique corrected    Instructed patient in bridges and patient is only able to clear gluts to S# level, Abdominal sets are poor overall and with bent leg raise she has difficulty stabilizing pelvis              Treatment Today     TREATMENT MINUTES COMMENTS   Evaluation     Self-care/ Home management     Manual therapy     Neuromuscular Re-education     Therapeutic Activity     Therapeutic Exercises 60 Reviewed all exercises issued , corrected technique when needed, and added bridging and abdominal sets   Gait training     Modality__________________                Total 60    Blank areas are intentional and mean the treatment did not include these items.       Cristina Almaraz  2017

## 2021-06-11 NOTE — PROGRESS NOTES
[facility] Daily Progress Monthly Summary    Patient Name: Nichole Titus  Date: 2017  Visit #:   Referral Diagnosis: Fracture of L3 Vertebra, left rib fractures  Referring provider: Velvet Mckay MD  Visit Diagnosis:     ICD-10-CM    1. Lumbar burst fracture, with routine healing, subsequent encounter S32.001D    2. Thoracic compression fracture, sequela S22.000S    3. Diabetes mellitus, type II, insulin dependent E11.9     Z79.4    4. Hypothyroidism, unspecified E03.9    5. Chronic kidney disease, stage III (moderate) N18.3    6. Loss of consciousness due to hypoglycemia (glucose 46 per EMT) R40.20    7. Chronic back pain greater than 3 months duration M54.9     G89.29    8. Hypoglycemia E16.2          Assessment:   Overall patient moving quicker, but is still on pain meds and still hasfunctional pain disability.  Patient demonstrates understanding/independence with home program.  Patient is benefitting from skilled physical therapy and is making steady progress toward functional goals.    Goal Status:  Pt. will have improved quality of sleep: waking less times/night;in 12 weeks;Comment  Comment:: waking 3x.night or less  Pt. will be able to walk : 30 minutes;for exercise/recreation;in 12 weeks;Comment  Comment:: with less than 5/10 back pain  Patient will sit: 30 minutes;for driving;for other activity;in 12 weeks;Comment  Other Activity: with less than 5/10 back pain  Patient will transfer: sit/stand;supine/sit;in 12 weeks;Comment  Comment: with less than 5/10 back pain    Pain is still 8-101/10 with all above goals  Patient able to sleep later/longer but still wakes hourly    Plan / Patient Education:   Continue fascial work on anterior and posterior neural fascia of ribs 8-12, diaphragm viscera  Continue with initial plan of care.  Progress with home program as tolerated.    Subjective:     Pain Ratin  Sitting is the hardest for me, and my dad fell (dad lives with her).  Patient states her  left buttock has been sore lately.  Pain is constantly present       Objective:     Patient able to transfer sit to stand and walk more quickly.  Pt still wearing lumbar corset  Trunk flexion is 24 inches fingertips to the floor  Trunk is nearly erect today, but slightly side bent left  Patient is able to lay down on plinth much easier, and tolerates supine position now    Treatment Today     TREATMENT MINUTES COMMENTS   Evaluation     Self-care/ Home management     Manual therapy 55 Supine MT/SCS to Bilat MR-V, IVC-V, LOT-V, Bilat MUL-V, PYL-V, LFT9-10-M, LF C4 right-M, ABDA-A   Neuromuscular Re-education     Therapeutic Activity     Therapeutic Exercises     Gait training     Modality__________________                Total 60    Blank areas are intentional and mean the treatment did not include these items.       Cristina Almaraz  7/12/2017

## 2021-06-11 NOTE — PROGRESS NOTES
[facility] Daily Progress     Patient Name: Nichole Titus  Date: 7/5/2017  Visit #: 7/12  Referral Diagnosis: Fracture of L3 Vertebra, left rib fractures  Referring provider: Velvet Mckay MD  Visit Diagnosis:     ICD-10-CM    1. Lumbar burst fracture, with routine healing, subsequent encounter S32.001D    2. Thoracic compression fracture, sequela S22.000S    3. Diabetes mellitus, type II, insulin dependent E11.9     Z79.4    4. Hypothyroidism, unspecified E03.9    5. Chronic kidney disease, stage III (moderate) N18.3    6. Loss of consciousness due to hypoglycemia (glucose 46 per EMT) R40.20    7. Chronic back pain greater than 3 months duration M54.9     G89.29    8. Hypoglycemia E16.2          Assessment:     Patient reports she still has a very high level of back pain even at rest- 8/10-9/10. With ADL's she is 9-10/0 even with meds and PT. Patient has been encouraged to continue pursuing referrals for alternative methods of pain relief with her MD. Some of her back pain may indeed be visceral fascial referral as she was very painful to palpate tender points as noted in treatment done today, and these do refer to the back and abdomen.    Goal Status:  Pt. will have improved quality of sleep: waking less times/night;in 12 weeks;Comment  Comment:: waking 3x.night or less  Pt. will be able to walk : 30 minutes;for exercise/recreation;in 12 weeks;Comment  Comment:: with less than 5/10 back pain  Patient will sit: 30 minutes;for driving;for other activity;in 12 weeks;Comment  Other Activity: with less than 5/10 back pain  Patient will transfer: sit/stand;supine/sit;in 12 weeks;Comment  Comment: with less than 5/10 back pain    Pain still severe in back and radiating to abdomen    Plan / Patient Education:     Continue with initial plan of care.   Continue arterial and visceral fascial releases to decrease back pain  Patient has trunk flexibility and strength home exercises as appropriate for her current pain  level    Subjective:     Pain Rating: 10  I saw my doctor 7/3 and because I am on Oxycodone, they are considering alternate methods of care including Pain Clinic.     The pain has been over a 10/10 the past 3 days, and I could hardly walk. I have had pain wrapping around from the back and going into my stomach and both groins. I had a burning pain in the middle of my back last night.     Patient had questions regarding her pain, and could this pain be coming from something they haven't looked at. Last xrays of the affected were taken in April- and some healing was noted.    Patient is walking 20 minutes- back pain 10/10      Objective:     Added core stability exercises by using theraband shoulder exercises. Back pain occurred with shoulder shoulder flexion and we chose not to do this one. Patient currently does not do independent shopping- daughter does this with her as she does not have a car, and she needs help lifting and carrying groceries.    Trunk flexion is 23 inches fingertips to the floor- 10/10  OWT is 7 cm    Treatment Today     TREATMENT MINUTES COMMENTS   Evaluation     Self-care/ Home management     Manual therapy 25 Supine MT/SCS to Bilat LION-LV, Left IJ and PAR-LV, ABDA-A, Bilat INOM-LV   Neuromuscular Re-education     Therapeutic Activity     Therapeutic Exercises 30 Added shoulder band exercises, issued orange band, for core stability   Gait training     Modality__________________                Total 60    Blank areas are intentional and mean the treatment did not include these items.       Cristina Almaraz  7/5/2017
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
## 2021-06-12 NOTE — PROGRESS NOTES
[facility] Daily Progress     Patient Name: Nichole Titus  Date: 7/26/2017  Visit #: 10/12  Referral Diagnosis: Fracture of L3 Vertebra, left rib fractures  Referring provider: Melvina Garcia CNP  Visit Diagnosis:   Visit Diagnosis:     ICD-10-CM    1. Lumbar burst fracture, with routine healing, subsequent encounter S32.001D    2. Thoracic compression fracture, sequela S22.000S    3. Diabetes mellitus, type II, insulin dependent E11.9     Z79.4    4. Hypothyroidism, unspecified E03.9    5. Chronic kidney disease, stage III (moderate) N18.3    6. Loss of consciousness due to hypoglycemia (glucose 46 per EMT) R40.20    7. Chronic back pain greater than 3 months duration M54.9     G89.29    8. Aortic stenosis, moderate I35.0    9. Depression F32.9    10. Essential hypertension I10    11. History of stroke Z86.73          Assessment:     Patient has not met goals and in fact has increased back pain without reason over the past 3 days. She has plateaued, and we have decided to have patient continue with her exercises independently until after seeing a spine physician on August 19th for consult.  She is having shoulder pain more on the right side and I suspect it is a rotator cuff weakness and tendonitis from increased use of arms for transfers. She does have strengthening exercises to promote improved shoulder and rotator cuff strength.    Goal Status:  Pt. will have improved quality of sleep: waking less times/night;in 12 weeks;Comment  Comment:: waking 3x.night or less  Pt. will be able to walk : 30 minutes;for exercise/recreation;in 12 weeks;Comment  Comment:: with less than 5/10 back pain  Patient will sit: 30 minutes;for driving;for other activity;in 12 weeks;Comment  Other Activity: with less than 5/10 back pain  Patient will transfer: sit/stand;supine/sit;in 12 weeks;Comment  Comment: with less than 5/10 back pain     All goals not met, and patient today reports lumbar pain is 10/10 with radiation  to bilateral SI area. No LE numbness or tingling      Plan / Patient Education:     Patient will continue her home exercises as instructed and wait to see what spine consult recommends on August 19 th.    Subjective:     Pain Rating: 10 - Low back, Hard to take a deep breath due to pain in lower rib area  The last few days I have been in more pain.  I went to another doctor who recommended I go to a spine specialist appointment and this was set up August 16th, at [facility] ( has [id number]).  MRI of back was 6 months ago ( January 2017), and last Xray of spine is April 2017    My right shoulder hurts with pain down right outer arm during use of arm to push self during transfers.      Objective:     PSIS level in standing and standing  Gastroc strength: unable to do even 1 rep SL heel raise, 3/5  Palpation of low back at about L1 and patient had severe pain and flinching    Sit to stand from 20 inches is difficult due to leg weakness and patient falls back into chair making this an unsafe exercise to perform    Treatment Today     TREATMENT MINUTES COMMENTS   Evaluation     Self-care/ Home management     Manual therapy     Neuromuscular Re-education     Therapeutic Activity     Therapeutic Exercises 55 Reviewed band shoulder exercises and patient is doing correctly, increased heel raises to 3x/day, added quad strengthening in both seated and 30 degrees of flexion   Gait training     Modality__________________                Total 60    Blank areas are intentional and mean the treatment did not include these items.       Cristina Almaraz  7/26/2017

## 2021-06-12 NOTE — PROGRESS NOTES
[facility] Discharge Summary  Patient Name: Nichole Titus  Date: 8/21/2017  Referral Diagnosis:  Fracture of L3 Vertebra, left rib fractures  Referring provider:  Melvina Garcia CNP  Visit Diagnosis:   1. Lumbar burst fracture, with routine healing, subsequent encounter     2. Thoracic compression fracture, sequela     3. Diabetes mellitus, type II, insulin dependent     4. Hypothyroidism, unspecified     5. Chronic kidney disease, stage III (moderate)     6. Loss of consciousness due to hypoglycemia (glucose 46 per EMT)     7. Chronic back pain greater than 3 months duration     8. Aortic stenosis, moderate     9. Depression     10. Essential hypertension     11. History of stroke         Goals:    Pt. will have improved quality of sleep: waking less times/night;in 12 weeks;Comment  Comment:: waking 3x.night or less  Pt. will be able to walk : 30 minutes;for exercise/recreation;in 12 weeks;Comment  Comment:: with less than 5/10 back pain  Patient will sit: 30 minutes;for driving;for other activity;in 12 weeks;Comment  Other Activity: with less than 5/10 back pain  Patient will transfer: sit/stand;supine/sit;in 12 weeks;Comment  Comment: with less than 5/10 back pain    Above goals not met with LBP radiating to SI and 10/10 today. Patient denies any LE tingling or numbness.    On her last visit 7/26 patient reported she has had increased back and rib pain over the previous 3 days. [place] decision was made to have patient continue her exercises on her own and hold on any further PT, pending a Spine care consult on 8/19/17. No information on that visit is available in [facility], and patient has not called to schedule further PT so it will be assumed she has not been referred back to PT or has gone elsewhere for care.    Patient was seen for 10 visits from 5/1617 to 7/26/17 with no missed appointments.  The patient was instructed to follow up with physician's clinic.  Patient received a home program trunk  flexibility and strength,and right shoulder strengthening due to pain    Therapy will be discontinued at this time.  The patient will need a new referral to resume.    Thank you for your referral.  Cristina Almaraz  8/21/2017  9:30 AM

## 2021-06-13 NOTE — TELEPHONE ENCOUNTER
Patient calling. She states she  Has had a headache for the past 2 days.    After patient asked about symptoms, she chamnged her mind about getting covid tested, and hung up.    Joanna Morales RN  [facility] Triage/refill nurse

## 2021-06-15 PROBLEM — Z86.73 HISTORY OF STROKE: Chronic | Status: ACTIVE | Noted: 2017-01-06

## 2021-06-15 PROBLEM — R63.4 WEIGHT LOSS: Status: ACTIVE | Noted: 2017-01-06

## 2021-06-15 PROBLEM — R40.20 LOSS OF CONSCIOUSNESS (H): Status: ACTIVE | Noted: 2017-01-06

## 2021-06-15 PROBLEM — I35.0 AORTIC STENOSIS, MODERATE: Chronic | Status: ACTIVE | Noted: 2017-01-06

## 2021-06-15 PROBLEM — S32.001D: Status: ACTIVE | Noted: 2017-01-06

## 2021-06-15 PROBLEM — S22.000A THORACIC COMPRESSION FRACTURE, CLOSED, INITIAL ENCOUNTER (H): Status: ACTIVE | Noted: 2017-01-06

## 2021-06-16 NOTE — PROGRESS NOTES
ITP ASSESSMENT   Assessment Day: Initial  Session Number: 1  Precautions: Sternal, fractures in back  Diagnosis: Valve  Risk Stratification: High  Referring Provider: Barak Hannah Pe*  EXERCISE  Exercise Assessment: Initial   Tolerated 10' on Nustep at 2.2 METs. Limited by back pain                         Exercise Plan  Goals Next 30 days  ADL'S: Resume word puzzles  Leisure: Walk 3x/day   Work: Retired    Education Goals: All goals in this section met  Education Goals Met: Patient can state cardiac s/s and appropriate emergency response.;Has system for taking medication.;Medication review.    Exercise Prescription  Exercise Mode: Treadmill;Bike;Nustep;Arm Erg.;Hallway Walking  Frequency: 2x/week  Duration: 40'  Intensity / THR: 20-30 beats above resting heart rate  RPE 11-14  Progression / Met level: 2.2-2.7  Resistive Training?: Yes    Current Exercise (mins/week): 1    Interventions  Home Exercise:  Mode: Walk  Frequency: 3x/day  Duration: 5 min    Education Material : Educational videos;Provide written material;Individual education and counseling;Offer educational classes    Education Completed  Exercise Education Completed: Cardiac Anatomy;Signs and Symptoms;Medication review;RPE;Emergency Plan;Warm up/cool down;Home Exercise;FITT Principles;BP/HR Reponse to exercise;Benefits of Exercise;End point of exercise            Exercise Follow-up/Discharge  Follow up/Discharge: Pts walking is limited by chronic low back pain. Encouraged pt to take several short walks throughout the day every day as tolerates NUTRITION  Nutrition Assessment: Initial    Nutrition Risk Factors:  Nutrition Risk Factors: Diabetes;Dyslipidemia;Overweight  HbA1c: 9.1  Monitors blood sugar at home: Yes  Frequency: 3x/yehuda  Cholesterol: 170  LDL: 94  HDL: 51  Triglycerides: 124    Nutrition Plan  Interventions  Other Nutrition Intervention: Diet Class;Therapist/Pt Discussion;Educational Videos;Provide with Written Material    Education  "Completed  Nutrition Education Completed: Low Saturated fat diet;Risk factor overview;Low sodium diet;Weight management    Goals  Nutrition Goals (Next 30 days): Patient will follow a low sodium diet;Patient will follow a low saturated fat diet;Patient knows appropriate portion size;Patient will lose weight    Goals Met  Nutrition Goals Met: Patient can identify their risk factors for CAD;Completed Nutritional Risk Screen;Provided Rate your Plate Survey;Reviewed Dietitian schedule    Height, Weight, and  BMI  Weight: 158 lb (71.7 kg)  Height: 5' 1\" (1.549 m)  BMI: 29.87    Nutrition Follow-up  Follow-up/Discharge: Issued diet survey       Other Risk Factors  Other Risk Factor Assessment: Initial    HTN Risk Factor: Hypertension    Pre Exercise BP: 114/90  Post Exercise BP: 110/84    Hypertension Plan  Goals  HTN Goals: Follow low sodium diet;Exercises regularly    Goals Met  HTN Goals Met: Take medication as prescribed    HTN Interventions  HTN Interventions: Diet consult;Therapist/patient discussion;Provide written material;Offer educational videos;Offer educational classes    HTN Education Completed  HTN Education Completed: Low sodium diet;Medication review;Risk factor overview    Tobacco Risk Factor: NA    Risk Factor Follow-up   Follow-up/Discharge: Reviewed all risk factors and plan for risk mgmt   PSYCHOSOCIAL  Psychosocial Assessment: Initial     [facility] Q of L Summary Score: 33    RONALD-D Score: 29    Psychosocial Risk Factor: Stress    Psychosocial Plan  Interventions  If RONALD-D > 15 send letter to MD  Interventions: Offer Spiritual Care consult;Offer educational videos and classes;Provide written material;Individual education and counseling    Education Completed  Education Completed: Relaxation/Coping Techniques;Effects of stress on body    Goals  Goals (Next 30 days): Identify stressors;Practicing stress management skills    Goals Met  Goals Met: Identified Support system;Oriented to stress " management classes    Psychosocial Follow-up  Follow-up/Discharge: Reviewed effects of stress and importance of stress mgmt. Letter sent to PMD regarding RONALD-D score     Patient involved in Goal setting?: Yes    Signature: _____________________________________________________________    Date: __________________    Time: __________________

## 2021-06-16 NOTE — PROGRESS NOTES
[facility]hab  Phase II Assessment    Assessment Date: 2/19/18    Diagnosis: Severe Aortic Stenosis   Procedure: AVR  Date of Onset: 1/5/18  ICD/Pacemaker: No Parameters: NA  Post-op Complications: acute blood loss anemia, retained chest tube - surgically removed 1/8/18  ECG History: SR EF%: 70  Past Medical History:   Past Medical History:   Diagnosis Date     Aortic stenosis, moderate 1/6/2017     Depression      Diabetes mellitus, type 2      Essential hypertension      ETOH abuse     patient states this was a one time event     Hypothyroid      Multiple-type hyperlipidemia 5/29/2013     Stroke      TIA (transient ischemic attack) 12/02/2016     Patient Active Problem List   Diagnosis     Diabetes mellitus, type II, insulin dependent     Hypothyroidism, unspecified     Hypertension, benign essential, goal below 140/90     Polycythemia     Lumbar burst fracture, with routine healing, subsequent encounter     Chronic kidney disease, stage III (moderate)     Multiple-type hyperlipidemia     Depression     History of stroke     Thoracic compression fracture, closed, initial encounter     Weight loss     Hypoglycemia     Aortic stenosis, moderate     Loss of consciousness due to hypoglycemia (glucose 46 per EMT)     Essential hypertension       Physical Assessment  Precautions/ Physical Limitations: Sternal, falls, chronic LBP - fracture at L4  Oxygen: No  O2 Sats: 97 Lung Sounds: Clear, slightly diminished in L base Edema: 0  Incisions: CDI  Sleeping Pattern: fair   Appetite: fair   Nutrition Risk Screen: Weight loss and Appetite/Intake    Pain  Location: Low back  Characteristics:chronic, ache  Intensity: (0-10 scale) 3  Current Pain Management: Oxycodone  Intervention: Every night before bed, PRN during the day    Psychosocial/ Emotional Health  1. In the past 12 months, have you been in a relationship where you have been abused physically, emotionally, sexually or financially? No  notified: NA  2.  Who do you turn to for emotional support?: Daughter  3. Do you have cultural or spiritual needs? No  4. Have there been any major life changes in the past 12 months? No    Referral Information  Primary Physician: Velvet Mckay MD  Cardiologist: Gómez Cline  Surgeon: Toy Kunz    Home exercise/Equipment: None    Patient's long-term goal(s): Return to walking 5 miles daily    1. Living Accommodations: Home Steps: Yes      Support people at home: Father   2. Marital Status: single  3. Family is able to assist with cares      Yarsani/Community involvement: None  4. Recreation/Hobbies: Reading, word puzzles

## 2021-06-16 NOTE — PROGRESS NOTES
ITP ASSESSMENT   Assessment Day: 30 Day  Session Number: 6  Precautions: Sternal, spinal fractures  Diagnosis: Valve  Risk Stratification: High  Referring Provider: Dr. Hannah  EXERCISE  Exercise Assessment: Reassessment     Pt tolerates 40 min of exercise on the NuStep at peak of 2.5 METs. Pt is limited in which equipment she can use due to chronic back pain.                         Exercise Plan  Goals Next 30 days  ADL'S: Resume laundry  Leisure: Walk outside with back brace  Work: Retired    Education Goals: All goals in this section met  Education Goals Met: Patient can state cardiac s/s and appropriate emergency response.;Has system for taking medication.;Medication review.                        Goals Met  Initial ADL's goals met: Pt has resumed word puzzles - goal met  Initial Leisure goals met: Pt is walking 3-4 x/day - goal met  Initial Progression: Pt has met all initial, progess is limited by chronic low back pain    Exercise Prescription  Exercise Mode: Treadmill;Bike;Nustep;Arm Erg.;Hallway Walking  Frequency: 2x/week  Duration: 40'  Intensity / THR: 20-30 beats above resting heart rate  RPE 11-14  Progression / Met level: 2.5-3  Resistive Training?: Yes    Current Exercise (mins/week): 150    Interventions  Home Exercise:  Mode: Walk  Frequency: 3-4x/day   Duration: 5-10 min    Education Material : Educational videos;Provide written material;Individual education and counseling;Offer educational classes    Education Completed  Exercise Education Completed: Cardiac Anatomy;Signs and Symptoms;Medication review;RPE;Emergency Plan;Warm up/cool down;Home Exercise;FITT Principles;BP/HR Reponse to exercise;Benefits of Exercise;End point of exercise            Exercise Follow-up/Discharge  Follow up/Discharge: Pt is walking several times throughout the day totaling 15 to 30 min depending on the day. Pt is limited by chronic back pain.  NUTRITION  Nutrition Assessment: Reassessment    Nutrition Risk  "Factors:  Nutrition Risk Factors: Diabetes;Dyslipidemia;Overweight  HbA1c: 9.1  Monitors blood sugar at home: Yes  Frequency: 3x/day  Cholesterol: 170  LDL: 94  HDL: 51  Triglycerides: 124    Nutrition Plan  Interventions  Other Nutrition Intervention: Diet Class;Therapist/Pt Discussion;Educational Videos;Provide with Written Material    Education Completed  Nutrition Education Completed: Low Saturated fat diet;Risk factor overview;Low sodium diet;Weight management    Goals  Nutrition Goals (Next 30 days): Patient will follow a low sodium diet;Patient will follow a low saturated fat diet;Patient will lose weight;Patient knows appropriate portion size    Goals Met  Nutrition Goals Met: Patient can identify their risk factors for CAD;Completed Nutritional Risk Screen;Provided Rate your Plate Survey;Reviewed Dietitian schedule    Height, Weight, and  BMI  Weight: 157 lb (71.2 kg)  Height: 5' 1\" (1.549 m)  BMI: 29.68    Nutrition Follow-up  Follow-up/Discharge: Reissued diet survey, scheduled pt for diet consult 3/14/18     Other Risk Factors  Other Risk Factor Assessment: Reassessment    HTN Risk Factor: Hypertension    Pre Exercise BP: 102/68  Post Exercise BP: 100/60    Hypertension Plan  Goals  HTN Goals: Follow low sodium diet;Exercises regularly    Goals Met  HTN Goals Met: Take medication as prescribed    HTN Interventions  HTN Interventions: Diet consult;Therapist/patient discussion;Provide written material;Offer educational videos;Offer educational classes    HTN Education Completed  HTN Education Completed: Low sodium diet;Medication review;Risk factor overview    Tobacco Risk Factor: NA    Risk Factor Follow-up   Follow-up/Discharge: Encouraged pt to attend education classes   PSYCHOSOCIAL  Psychosocial Assessment: Reassessment     [facility] of L Summary Score: 33    RONALD-D Score: 29    Psychosocial Risk Factor: Stress    Psychosocial Plan  Interventions  Interventions: Offer Spiritual Care " consult;Offer educational videos and classes;Provide written material;Individual education and counseling    Education Completed  Education Completed: Relaxation/Coping Techniques;Effects of stress on body    Goals  Goals (Next 30 days): Practicing stress management skills    Goals Met  Goals Met: Identified Support system;Oriented to stress management classes;Identify stressors    Psychosocial Follow-up  Follow-up/Discharge: Reviewed effects of stress and importance of stress mgmt     Patient involved in Goal setting?: Yes    Signature: _____________________________________________________________    Date: __________________    Time: __________________

## 2021-06-17 NOTE — PROGRESS NOTES
ITP ASSESSMENT   Assessment Day: 60 Day  Session Number: 13  Precautions: Spinal fractures  Diagnosis: Valve  Risk Stratification: High  Referring Provider: Velvet Mckay MD  EXERCISE  Exercise Assessment: Reassessment     Tolerates 40' on recumbent stepper at a peak of 2.7 METs. Limited by chronic back pain                         Exercise Plan  Goals Next 30 days  Leisure: Walk outside with back brace    Education Goals: All goals in this section met  Education Goals Met: Patient can state cardiac s/s and appropriate emergency response.;Has system for taking medication.;Medication review.                        Goals Met  30 day ADL'S goals met: Pt has resumed laundry - goal met  30 day Leisure goals met: Pt has not resumed walking outside due to inclement weather - goal not met  30 Day Progression: Pt is limited in most activities with back pain    Initial ADL's goals met: Pt has resumed word puzzles - goal met  Initial Leisure goals met: Pt is walking 3-4 x/day - goal met  Initial Progression: Pt has met all initial, progess is limited by chronic low back pain    Exercise Prescription  Exercise Mode: Treadmill;Bike;Nustep;Arm Erg.;Hallway Walking  Frequency: 2x/week  Duration: 40'  Intensity / THR: 20-30 beats above resting heart rate  RPE 11-14  Progression / Met level: 2.7-3.2  Resistive Training?: Yes    Current Exercise (mins/week): 150    Interventions  Home Exercise:  Mode: Walk  Frequency: 3-4x/day  Duration: 5-10 min    Education Material : Educational videos;Provide written material;Individual education and counseling;Offer educational classes    Education Completed  Exercise Education Completed: Cardiac Anatomy;Signs and Symptoms;Medication review;RPE;Emergency Plan;Warm up/cool down;Home Exercise;FITT Principles;BP/HR Reponse to exercise;Benefits of Exercise;End point of exercise            Exercise Follow-up/Discharge  Follow up/Discharge: Pt is continues to take short walks throughout the day. Limited  "by back pain. States when the weather gets nice she plans to walk outside NUTRITION  Nutrition Assessment: Reassessment    Nutrition Risk Factors:  Nutrition Risk Factors: Diabetes;Dyslipidemia;Overweight  HbA1c: 9.1  Monitors blood sugar at home: Yes  Frequency: 3x/day  Cholesterol: 170  LDL: 94  HDL: 51  Triglycerides: 124    Nutrition Plan  Interventions  Diet Consult: Completed  Other Nutrition Intervention: Diet Class;Therapist/Pt Discussion;Educational Videos;Provide with Written Material  Initial Rate Your Plate Score: 41    Education Completed  Nutrition Education Completed: Low Saturated fat diet;Risk factor overview;Low sodium diet;Weight management    Goals  Nutrition Goals (Next 30 days): Patient will follow a low sodium diet;Patient will follow a low saturated fat diet    Goals Met  Nutrition Goals Met: Patient can identify their risk factors for CAD;Completed Nutritional Risk Screen;Provided Rate your Plate Survey;Reviewed Dietitian schedule    Height, Weight, and  BMI  Weight: 155 lb (70.3 kg)  Height: 5' 1\" (1.549 m)  BMI: 29.3    Nutrition Follow-up  Follow-up/Discharge: Patient stated she does not cook and tends to buy  processed/salty foods majority of the time.  Patient seldom eats fruits and vegetables; she stated she might try to eat more of them.  Patient stated she does not count carbs and did not want any education on carb counting.        Other Risk Factors  Other Risk Factor Assessment: Reassessment    HTN Risk Factor: Hypertension    Pre Exercise BP: 110/72  Post Exercise BP: 90/52    Hypertension Plan  Goals  HTN Goals: Follow low sodium diet    Goals Met  HTN Goals Met: Take medication as prescribed;Exercises regularly    HTN Interventions  HTN Interventions: Diet consult;Therapist/patient discussion;Provide written material;Offer educational videos;Offer educational classes    HTN Education Completed  HTN Education Completed: Low sodium diet;Medication review;Risk factor " overview    Tobacco Risk Factor: NA    Risk Factor Follow-up   Follow-up/Discharge: Reviewed all risk factors and plan for risk mgmt PSYCHOSOCIAL  Psychosocial Assessment: Reassessment     Psychosocial Risk Factor: Stress    Psychosocial Plan  Interventions  Interventions: Offer Spiritual Care consult;Offer educational videos and classes;Provide written material;Individual education and counseling    Education Completed  Education Completed: Relaxation/Coping Techniques;Effects of stress on body    Goals  Goals (Next 30 days): Practicing stress management skills    Goals Met  Goals Met: Identified Support system;Oriented to stress management classes;Identify stressors    Psychosocial Follow-up  Follow-up/Discharge: Encouraged education classes           Patient involved in Goal setting?: Yes    Signature: _____________________________________________________________    Date: __________________    Time: __________________

## 2021-06-17 NOTE — PROGRESS NOTES
ITP ASSESSMENT   Assessment Day: 90 Day  Session Number: 19   Precautions: Spinal fractures  Diagnosis: Valve  Risk Stratification: High  Referring Provider: Velvet Mckay MD   ITP: Dr. Jose Hannah  EXERCISE  Exercise Assessment: Discharge     6 Minute Walk Test- not used as assessment tool due to back fractures and in a brace. Today she has opted to make today her last day, as she has returned to baseline and needs to care for her ailing father.                            Exercise Plan  Goals Next 30 days    Education Goals: All goals in this section met  Education Goals Met: Patient can state cardiac s/s and appropriate emergency response.;Has system for taking medication.;Medication review.                        Goals Met  30 day ADL'S goals met: Pt has resumed laundry - goal met  30 day Leisure goals met: Pt has not resumed walking outside due to inclement weather - goal not met  No Data Recorded  30 Day Progression: Pt is limited in most activities with back pain    Initial ADL's goals met: Pt has resumed word puzzles - goal met  Initial Leisure goals met: Pt is walking 3-4 x/day - goal met  No Data Recorded  Initial Progression: Pt has met all initial, progess is limited by chronic low back pain    Exercise Prescription  Exercise Mode: Treadmill;Bike;Nustep;Arm Erg.;Hallway Walking  Frequency: 2x/week  Duration: 40'  Intensity / THR: 20-30 beats above resting heart rate  RPE 11-14  Progression / Met level: 2.5  Resistive Training?: No (not reviewed dut to her back issues.)    Current Exercise (mins/week): 150    Interventions  Home Exercise:  Mode: walk  Frequency: 3-4x/day  Duration: 5-10 minutes    Education Material : Educational videos;Provide written material;Individual education and counseling;Offer educational classes    Education Completed  Exercise Education Completed: Cardiac Anatomy;Signs and Symptoms;Medication review;RPE;Emergency Plan;Warm up/cool down;Home Exercise;FITT Principles;BP/HR  "Reponse to exercise;Benefits of Exercise;End point of exercise            Exercise Follow-up/Discharge  Follow up/Discharge: Pt has opted to discharge today.  NUTRITION  Nutrition Assessment: Discharge    Nutrition Risk Factors:  Nutrition Risk Factors: Diabetes;Dyslipidemia;Overweight  HbA1c: 9.1  Monitors blood sugar at home: Yes  Frequency: 3x/day  Cholesterol: 170  LDL: 94  HDL: 51  Triglycerides: 124    Nutrition Plan  Interventions  Diet Consult: Completed  Other Nutrition Intervention: Diet Class;Therapist/Pt Discussion;Educational Videos;Provide with Written Material  Initial Rate Your Plate Score: 41    Education Completed  Nutrition Education Completed: Low Saturated fat diet;Risk factor overview;Low sodium diet;Weight management    Goals  Nutrition Goals (Next 30 days): Patient will follow a low sodium diet;Patient will follow a low saturated fat diet;Patient demonstrated understanding of cardiac nutrition, no goals identified for the next 30 days    Goals Met  Nutrition Goals Met: Patient can identify their risk factors for CAD;Completed Nutritional Risk Screen;Provided Rate your Plate Survey;Reviewed Dietitian schedule    Height, Weight, and  BMI  Weight: 152 lb (68.9 kg)  Height: 5' 1\" (1.549 m)  BMI: 28.74    Nutrition Follow-up  Follow-up/Discharge: Pt did not complete f/u rate your plate survey.       Other Risk Factors  Other Risk Factor Assessment: Discharge    HTN Risk Factor: Hypertension    Pre Exercise BP: 150/84  Post Exercise BP: 118/74    Hypertension Plan  Goals  HTN Goals: Patient demonstrates understanding of HTN, no goals identified for the next 30 days    Goals Met  HTN Goals Met: Follow low sodium diet;Take medication as prescribed;Exercises regularly    HTN Interventions  HTN Interventions: Diet consult;Therapist/patient discussion;Provide written material;Offer educational videos;Offer educational classes    HTN Education Completed  HTN Education Completed: Low sodium " diet;Medication review;Risk factor overview    Tobacco Risk Factor: NA      Risk Factor Follow-up   Follow-up/Discharge: BP was higher today. Reports busy morning. Reviewed sodium intake, exercise, stress effects on her body.   PSYCHOSOCIAL  Psychosocial Assessment: Discharge     [place] COOP Q of L Summary Score: 24    RONALD-D Score: 19    Psychosocial Risk Factor: Stress    Psychosocial Plan  Interventions  Interventions: Offer Spiritual Care consult;Offer educational videos and classes;Provide written material;Individual education and counseling    Education Completed  Education Completed: Relaxation/Coping Techniques;Effects of stress on body    Goals  Goals (Next 30 days): Practicing stress management skills    Goals Met  Goals Met: Identified Support system;Oriented to stress management classes;Identify stressors    Psychosocial Follow-up  Follow-up/Discharge: Pt does have some stress related to aging father, but feels that she has a good grasp on it.            Patient involved in Goal setting?: Yes    Signature: _____________________________________________________________    Date: __________________    Time: __________________See Doc Flowsheet

## 2021-07-13 ENCOUNTER — RECORDS - HEALTHEAST (OUTPATIENT)
Dept: ADMINISTRATIVE | Facility: CLINIC | Age: 71
End: 2021-07-13

## 2021-07-21 ENCOUNTER — RECORDS - HEALTHEAST (OUTPATIENT)
Dept: ADMINISTRATIVE | Facility: CLINIC | Age: 71
End: 2021-07-21

## 2021-07-22 ENCOUNTER — RECORDS - HEALTHEAST (OUTPATIENT)
Dept: SCHEDULING | Facility: CLINIC | Age: 71
End: 2021-07-22

## 2021-07-22 DIAGNOSIS — Z12.31 OTHER SCREENING MAMMOGRAM: ICD-10-CM

## 2021-08-03 PROBLEM — S32.000A LUMBAR COMPRESSION FRACTURE (H): Status: RESOLVED | Noted: 2017-01-06 | Resolved: 2017-01-09

## 2022-04-03 ENCOUNTER — APPOINTMENT (OUTPATIENT)
Dept: MRI IMAGING | Facility: HOSPITAL | Age: 72
End: 2022-04-03
Attending: EMERGENCY MEDICINE
Payer: COMMERCIAL

## 2022-04-03 ENCOUNTER — HOSPITAL ENCOUNTER (EMERGENCY)
Facility: HOSPITAL | Age: 72
Discharge: HOME | End: 2022-04-03
Attending: EMERGENCY MEDICINE | Admitting: EMERGENCY MEDICINE
Payer: COMMERCIAL

## 2022-04-03 ENCOUNTER — APPOINTMENT (OUTPATIENT)
Dept: CT IMAGING | Facility: HOSPITAL | Age: 72
End: 2022-04-03
Attending: EMERGENCY MEDICINE
Payer: COMMERCIAL

## 2022-04-03 VITALS
SYSTOLIC BLOOD PRESSURE: 170 MMHG | HEIGHT: 61 IN | DIASTOLIC BLOOD PRESSURE: 86 MMHG | WEIGHT: 165 LBS | TEMPERATURE: 98 F | HEART RATE: 65 BPM | BODY MASS INDEX: 31.15 KG/M2 | OXYGEN SATURATION: 97 % | RESPIRATION RATE: 10 BRPM

## 2022-04-03 DIAGNOSIS — R42 LIGHTHEADEDNESS: ICD-10-CM

## 2022-04-03 LAB
ALBUMIN SERPL-MCNC: 4.1 G/DL (ref 3.5–5)
ALBUMIN UR-MCNC: 10 MG/DL
ALP SERPL-CCNC: 89 U/L (ref 45–120)
ALT SERPL W P-5'-P-CCNC: 22 U/L (ref 0–45)
ANION GAP SERPL CALCULATED.3IONS-SCNC: 10 MMOL/L (ref 5–18)
APPEARANCE UR: CLEAR
AST SERPL W P-5'-P-CCNC: 18 U/L (ref 0–40)
BASOPHILS # BLD AUTO: 0 10E3/UL (ref 0–0.2)
BASOPHILS NFR BLD AUTO: 1 %
BILIRUB DIRECT SERPL-MCNC: 0.3 MG/DL
BILIRUB SERPL-MCNC: 0.9 MG/DL (ref 0–1)
BILIRUB UR QL STRIP: NEGATIVE
BUN SERPL-MCNC: 13 MG/DL (ref 8–28)
CALCIUM SERPL-MCNC: 10.2 MG/DL (ref 8.5–10.5)
CHLORIDE BLD-SCNC: 106 MMOL/L (ref 98–107)
CO2 SERPL-SCNC: 24 MMOL/L (ref 22–31)
COLOR UR AUTO: COLORLESS
CREAT SERPL-MCNC: 0.93 MG/DL (ref 0.6–1.1)
EOSINOPHIL # BLD AUTO: 0.2 10E3/UL (ref 0–0.7)
EOSINOPHIL NFR BLD AUTO: 3 %
ERYTHROCYTE [DISTWIDTH] IN BLOOD BY AUTOMATED COUNT: 12.4 % (ref 10–15)
GFR SERPL CREATININE-BSD FRML MDRD: 65 ML/MIN/1.73M2
GLUCOSE BLD-MCNC: 163 MG/DL (ref 70–125)
GLUCOSE UR STRIP-MCNC: 150 MG/DL
HCT VFR BLD AUTO: 46.2 % (ref 35–47)
HGB BLD-MCNC: 15.9 G/DL (ref 11.7–15.7)
HGB UR QL STRIP: NEGATIVE
IMM GRANULOCYTES # BLD: 0 10E3/UL
IMM GRANULOCYTES NFR BLD: 0 %
KETONES UR STRIP-MCNC: NEGATIVE MG/DL
LEUKOCYTE ESTERASE UR QL STRIP: NEGATIVE
LIPASE SERPL-CCNC: 11 U/L (ref 0–52)
LYMPHOCYTES # BLD AUTO: 1.6 10E3/UL (ref 0.8–5.3)
LYMPHOCYTES NFR BLD AUTO: 26 %
MAGNESIUM SERPL-MCNC: 2 MG/DL (ref 1.8–2.6)
MCH RBC QN AUTO: 28.7 PG (ref 26.5–33)
MCHC RBC AUTO-ENTMCNC: 34.4 G/DL (ref 31.5–36.5)
MCV RBC AUTO: 83 FL (ref 78–100)
MONOCYTES # BLD AUTO: 0.5 10E3/UL (ref 0–1.3)
MONOCYTES NFR BLD AUTO: 7 %
NEUTROPHILS # BLD AUTO: 3.9 10E3/UL (ref 1.6–8.3)
NEUTROPHILS NFR BLD AUTO: 63 %
NITRATE UR QL: NEGATIVE
NRBC # BLD AUTO: 0 10E3/UL
NRBC BLD AUTO-RTO: 0 /100
PH UR STRIP: 7 [PH] (ref 5–7)
PLATELET # BLD AUTO: 222 10E3/UL (ref 150–450)
POTASSIUM BLD-SCNC: 3.8 MMOL/L (ref 3.5–5)
PROT SERPL-MCNC: 7 G/DL (ref 6–8)
RBC # BLD AUTO: 5.54 10E6/UL (ref 3.8–5.2)
RBC URINE: 0 /HPF
SODIUM SERPL-SCNC: 140 MMOL/L (ref 136–145)
SP GR UR STRIP: 1.01 (ref 1–1.03)
SQUAMOUS EPITHELIAL: <1 /HPF
TROPONIN I SERPL-MCNC: <0.01 NG/ML (ref 0–0.29)
UROBILINOGEN UR STRIP-MCNC: <2 MG/DL
WBC # BLD AUTO: 6.2 10E3/UL (ref 4–11)
WBC URINE: 1 /HPF

## 2022-04-03 PROCEDURE — 99285 EMERGENCY DEPT VISIT HI MDM: CPT | Mod: 25

## 2022-04-03 PROCEDURE — 83690 ASSAY OF LIPASE: CPT | Performed by: EMERGENCY MEDICINE

## 2022-04-03 PROCEDURE — 255N000002 HC RX 255 OP 636: Performed by: EMERGENCY MEDICINE

## 2022-04-03 PROCEDURE — 81001 URINALYSIS AUTO W/SCOPE: CPT | Performed by: EMERGENCY MEDICINE

## 2022-04-03 PROCEDURE — 84484 ASSAY OF TROPONIN QUANT: CPT | Performed by: EMERGENCY MEDICINE

## 2022-04-03 PROCEDURE — 36415 COLL VENOUS BLD VENIPUNCTURE: CPT | Performed by: EMERGENCY MEDICINE

## 2022-04-03 PROCEDURE — 70544 MR ANGIOGRAPHY HEAD W/O DYE: CPT | Mod: XS

## 2022-04-03 PROCEDURE — 70450 CT HEAD/BRAIN W/O DYE: CPT

## 2022-04-03 PROCEDURE — A9585 GADOBUTROL INJECTION: HCPCS | Performed by: EMERGENCY MEDICINE

## 2022-04-03 PROCEDURE — 82248 BILIRUBIN DIRECT: CPT | Performed by: EMERGENCY MEDICINE

## 2022-04-03 PROCEDURE — 80048 BASIC METABOLIC PNL TOTAL CA: CPT | Performed by: EMERGENCY MEDICINE

## 2022-04-03 PROCEDURE — 258N000003 HC RX IP 258 OP 636: Performed by: EMERGENCY MEDICINE

## 2022-04-03 PROCEDURE — 70549 MR ANGIOGRAPH NECK W/O&W/DYE: CPT

## 2022-04-03 PROCEDURE — 250N000013 HC RX MED GY IP 250 OP 250 PS 637: Performed by: EMERGENCY MEDICINE

## 2022-04-03 PROCEDURE — 85025 COMPLETE CBC W/AUTO DIFF WBC: CPT | Performed by: EMERGENCY MEDICINE

## 2022-04-03 PROCEDURE — 83735 ASSAY OF MAGNESIUM: CPT | Performed by: EMERGENCY MEDICINE

## 2022-04-03 PROCEDURE — 93005 ELECTROCARDIOGRAM TRACING: CPT | Performed by: EMERGENCY MEDICINE

## 2022-04-03 PROCEDURE — 70553 MRI BRAIN STEM W/O & W/DYE: CPT

## 2022-04-03 RX ORDER — MECLIZINE HCL 12.5 MG 12.5 MG/1
25 TABLET ORAL ONCE
Status: COMPLETED | OUTPATIENT
Start: 2022-04-03 | End: 2022-04-03

## 2022-04-03 RX ORDER — GADOBUTROL 604.72 MG/ML
7 INJECTION INTRAVENOUS ONCE
Status: COMPLETED | OUTPATIENT
Start: 2022-04-03 | End: 2022-04-03

## 2022-04-03 RX ADMIN — GADOBUTROL 7 ML: 604.72 INJECTION INTRAVENOUS at 20:18

## 2022-04-03 RX ADMIN — MECLIZINE HCL 12.5 MG 25 MG: 12.5 TABLET ORAL at 19:22

## 2022-04-03 RX ADMIN — SODIUM CHLORIDE 1000 ML: 9 INJECTION, SOLUTION INTRAVENOUS at 18:32

## 2022-04-03 ASSESSMENT — ENCOUNTER SYMPTOMS
ABDOMINAL PAIN: 0
SORE THROAT: 0
FEVER: 0
DIZZINESS: 1
WEAKNESS: 0
NUMBNESS: 0
DIARRHEA: 0
APPETITE CHANGE: 0
NAUSEA: 0
SPEECH DIFFICULTY: 0
HEMATURIA: 0
COUGH: 0
CONFUSION: 0
VOMITING: 0
JOINT SWELLING: 0
SHORTNESS OF BREATH: 0
DYSURIA: 0
CHILLS: 0
BLOOD IN STOOL: 0

## 2022-04-03 NOTE — ED PROVIDER NOTES
"  Emergency Department Encounter     Evaluation Date & Time:   4/3/2022  5:32 PM    CHIEF COMPLAINT:  Dizziness, Hypertension, and Vomiting      Triage Note:Patient presents to ED with dizziness that began yesterday afternoon.  States dizziness started gradually.  Having loose stools today.  Denies fever.  Denies headache.  States BP have been elevated.               ED COURSE & MEDICAL DECISION MAKING:     ED Course as of 04/03/22 2212   Sun Apr 03, 2022   1855 CT head negative.  CBC unremarkable.     1900 Labs unremarkable.  Will reassess pt.   1910 Pt re-evaluated, persistently feeling \"dizzy\", which she still refers to as lightheaded and no room spinning, but when I attempt to stand her, she feels unbalanced.  Will get MRI/MRA studies to rule out posterior stroke, meclizine PO.     Pt does have a previous aortic valve replacement, follows closely with cardiology through [facility], and last echo shows no complications.  Pt has no cp/sob or exertional components to any of this.     2105 MR/MRA studies showing no acute pathology, but chronic, progressive findings, which I discussed with pt at length.     2113 Pt is overall feeling better on re-evaluation.  BP has come down significantly without intervention.  No acute pathology found on evaluation in ED.  We did discuss at length close follow up with primary and her cardiologist, including consideration for outpatient echocardiogram.  Pt has follow up with primary tomorrow and will proceed with this.        Pt presenting with family for evaluation of feeling lightheaded/weak since yesterday morning.  Pt denies room spinning sensation, vision changes, lateralizing symptoms or other acute changes with this.  States she was well prior to feeling this and no recent fevers, cough, n/v/d or bleeding history.  She has HTN at baseline, but states BP higher today.  Will get labs, CT head, hydrate, reassess.      5:39 PM I met with the patient to gather history and to perform " "my initial exam. I discussed the plan for care while in the Emergency Department. PPE (gloves, N95 mask) was worn during patient encounters.   7:02 PM I re-evaluated the patient and updated the patient on initial results.   9:06 PM I updated the patient and daughter on results.       At the conclusion of the encounter I discussed the results of all the tests and the disposition. The questions were answered. The patient or family acknowledged understanding and was agreeable with the care plan.      MEDICATIONS GIVEN IN THE EMERGENCY DEPARTMENT:  Medications   0.9% sodium chloride BOLUS (0 mLs Intravenous Stopped 4/3/22 2118)   meclizine (ANTIVERT) tablet 25 mg (25 mg Oral Given 4/3/22 1922)   gadobutrol (GADAVIST) injection 7 mL (7 mLs Intravenous Given 4/3/22 2018)       NEW PRESCRIPTIONS STARTED AT TODAY'S ED VISIT:  Discharge Medication List as of 4/3/2022  9:18 PM          HPI   HPI     Nichole Titus is a 72 year old female with a pertinent history of diabetes mellitus II (on insulin), hypothyroidism, hypertension, hyperlipidemia, stroke, and CKD (stage III) who presents to this ED by private vehicle with daughter for evaluation of dizziness.    Patient reports that yesterday during the morning she had sudden onset of dizziness that feels that she will \"pass out\". This occurred suddenly and patient cannot recall anything to onset symptoms. Today, patient feels \"awful\" and symptoms have worsened with standing. She has required aide from her daughter to ambulate which is abnormal for patient. Patient mentions her blood pressure has been higher than normal, despite taking her medications.  Denies any vision changes, numbness, weakness, speech difficulty, chest pain, vomiting, diarrhea, fever, cough, shortness of breath, bloody stools, appetite changes, or additional medical concerns or complaints at this time.     Of note, patient denies any fall or injuries.       REVIEW OF SYSTEMS:  Review of Systems "   Constitutional: Negative for appetite change, chills and fever.   HENT: Negative for sore throat.    Eyes: Negative for visual disturbance.   Respiratory: Negative for cough and shortness of breath.    Cardiovascular: Negative for chest pain.   Gastrointestinal: Negative for abdominal pain, blood in stool, diarrhea, nausea and vomiting.   Endocrine: Negative for polyuria.   Genitourinary: Negative for dysuria and hematuria.        - urinary changes     Musculoskeletal: Negative for joint swelling.   Skin: Negative for rash.   Neurological: Positive for dizziness (feel faint). Negative for speech difficulty, weakness and numbness.   Psychiatric/Behavioral: Negative for confusion.   All other systems reviewed and are negative.      Medical History     Past Medical History:   Diagnosis Date     Chronic kidney disease      Diabetes (H)      Hypertension        Past Surgical History:   Procedure Laterality Date     APPENDECTOMY       CHOLECYSTECTOMY       HYSTERECTOMY  1989     LARYNGOSCOPY N/A 1/26/2016    Procedure: LARYNGOSCOPY, REMOVAL OF FOREIGN BODY;  Surgeon: Hung VILLASEÑOR MD;  Location: [facility];  Service:      NASAL FRACTURE SURGERY       OOPHORECTOMY Bilateral 1989     TONSILLECTOMY & ADENOIDECTOMY         Family History   Problem Relation Age of Onset     Breast Cancer Mother 62.00     Diabetes Father      Glaucoma Father      Diabetes Brother      Fibromyalgia Brother      No Known Problems Daughter        Social History     Tobacco Use     Smoking status: Never Smoker     Smokeless tobacco: Never Used   Substance Use Topics     Alcohol use: Yes     Alcohol/week: 1.7 standard drinks     Comment: Alcoholic Drinks/day: once a week     Drug use: No       acetaminophen (TYLENOL) 500 MG tablet  acetone, urine, test Strp  albuterol (PROAIR HFA;PROVENTIL HFA;VENTOLIN HFA) 90 mcg/actuation inhaler  alendronate (FOSAMAX) 70 MG tablet  aspirin 81 MG EC tablet  atorvastatin (LIPITOR) 20 MG tablet  blood  "sugar diagnostic (CONTOUR NEXT STRIPS) Strp  calcium, as carbonate, (OS-MICHAEL) 500 mg calcium (1,250 mg) tablet  cholecalciferol, vitamin D3, 400 unit Tab  clopidogrel (PLAVIX) 75 mg tablet  furosemide (LASIX) 40 MG tablet  gabapentin (NEURONTIN) 100 MG capsule  insulin aspart (NOVOLOG FLEXPEN) 100 unit/mL injection pen  LANTUS 100 unit/mL injection  levothyroxine (SYNTHROID, LEVOTHROID) 137 MCG tablet  lidocaine (LIDODERM) 5 %  lisinopril (PRINIVIL,ZESTRIL) 20 MG tablet  metFORMIN (GLUCOPHAGE) 500 MG tablet  metoprolol tartrate (LOPRESSOR) 25 MG tablet  mirtazapine (REMERON) 15 MG tablet  oxyCODONE (ROXICODONE) 5 MG immediate release tablet  polyethylene glycol (MIRALAX) 17 gram packet  potassium chloride SA (K-DUR,KLOR-CON) 20 MEQ tablet  senna-docusate (PERICOLACE) 8.6-50 mg tablet  sitaGLIPtin (JANUVIA) 100 MG tablet        Physical Exam     Vitals:  BP (!) 170/86   Pulse 65   Temp 98  F (36.7  C) (Oral)   Resp 10   Ht 1.549 m (5' 1\")   Wt 74.8 kg (165 lb)   SpO2 97%   BMI 31.18 kg/m      PHYSICAL EXAM:   Physical Exam  Vitals and nursing note reviewed.   Constitutional:       General: She is not in acute distress.     Appearance: Normal appearance.   HENT:      Head: Normocephalic and atraumatic.      Mouth/Throat:      Mouth: Mucous membranes are moist.   Eyes:      Extraocular Movements: Extraocular movements intact.      Pupils: Pupils are equal, round, and reactive to light.      Comments: No vertical or bidirectional nystagmus   Cardiovascular:      Rate and Rhythm: Normal rate and regular rhythm.   Pulmonary:      Effort: Pulmonary effort is normal. No respiratory distress.      Breath sounds: Normal breath sounds.   Abdominal:      General: There is no distension.      Palpations: Abdomen is soft.      Tenderness: There is no abdominal tenderness.   Musculoskeletal:         General: Normal range of motion.      Cervical back: Normal range of motion.   Skin:     General: Skin is warm.      Capillary " Refill: Capillary refill takes less than 2 seconds.   Neurological:      Mental Status: She is alert.      Cranial Nerves: Cranial nerves are intact.      Sensory: Sensation is intact.      Motor: Motor function is intact.      Comments: Fluent speech, no facial asymmetry or pronator drift, 5/5 strength b/l UE/LE, normal finger to nose b/l         Results     LAB:  All pertinent labs reviewed and interpreted  Labs Ordered and Resulted from Time of ED Arrival to Time of ED Departure   BASIC METABOLIC PANEL - Abnormal       Result Value    Sodium 140      Potassium 3.8      Chloride 106      Carbon Dioxide (CO2) 24      Anion Gap 10      Urea Nitrogen 13      Creatinine 0.93      Calcium 10.2      Glucose 163 (*)     GFR Estimate 65     ROUTINE UA WITH MICROSCOPIC REFLEX TO CULTURE - Abnormal    Color Urine Colorless      Appearance Urine Clear      Glucose Urine 150  (*)     Bilirubin Urine Negative      Ketones Urine Negative      Specific Gravity Urine 1.010      Blood Urine Negative      pH Urine 7.0      Protein Albumin Urine 10  (*)     Urobilinogen Urine <2.0      Nitrite Urine Negative      Leukocyte Esterase Urine Negative      RBC Urine 0      WBC Urine 1      Squamous Epithelials Urine <1     CBC WITH PLATELETS AND DIFFERENTIAL - Abnormal    WBC Count 6.2      RBC Count 5.54 (*)     Hemoglobin 15.9 (*)     Hematocrit 46.2      MCV 83      MCH 28.7      MCHC 34.4      RDW 12.4      Platelet Count 222      % Neutrophils 63      % Lymphocytes 26      % Monocytes 7      % Eosinophils 3      % Basophils 1      % Immature Granulocytes 0      NRBCs per 100 WBC 0      Absolute Neutrophils 3.9      Absolute Lymphocytes 1.6      Absolute Monocytes 0.5      Absolute Eosinophils 0.2      Absolute Basophils 0.0      Absolute Immature Granulocytes 0.0      Absolute NRBCs 0.0     TROPONIN I - Normal    Troponin I <0.01     MAGNESIUM - Normal    Magnesium 2.0     LIPASE - Normal    Lipase 11     HEPATIC FUNCTION PANEL -  Normal    Bilirubin Total 0.9      Bilirubin Direct 0.3      Protein Total 7.0      Albumin 4.1      Alkaline Phosphatase 89      AST 18      ALT 22         RADIOLOGY:  MRA Brain ([place]) wo Contrast   Final Result   CONCLUSION:   HEAD MRI:    1.  No acute/subacute infarction, acute intracranial hemorrhage, mass effect, hydrocephalus, or abnormal enhancement.   2.  Interval development of multiple chronic lacunar infarctions in the bilateral cerebellar hemispheres, right greater than left.   3.  Foci of gradient susceptibility in the peripheral cerebral hemispheres as described above, likely representing chronic microhemorrhages. Peripheral distribution is suggestive of cerebral amyloid angiopathy versus chronic hypertensive microangiopathy.   4.  Mild global brain parenchymal volume loss with additional presumed sequelae of mild chronic small vessel ischemic disease.      HEAD MRA:    1.  No significant stenosis, vascular occlusion, aneurysm, or high flow AVM identified.   2.  Incidentally noted vascular variants as noted above.      NECK MRA:   1.  No significant stenosis in the neck vessels based on NASCET criteria.   2.  No evidence for dissection or pseudoaneurysm.      MRA Neck ([place]) wo & w Contrast   Final Result   CONCLUSION:   HEAD MRI:    1.  No acute/subacute infarction, acute intracranial hemorrhage, mass effect, hydrocephalus, or abnormal enhancement.   2.  Interval development of multiple chronic lacunar infarctions in the bilateral cerebellar hemispheres, right greater than left.   3.  Foci of gradient susceptibility in the peripheral cerebral hemispheres as described above, likely representing chronic microhemorrhages. Peripheral distribution is suggestive of cerebral amyloid angiopathy versus chronic hypertensive microangiopathy.   4.  Mild global brain parenchymal volume loss with additional presumed sequelae of mild chronic small vessel ischemic disease.      HEAD MRA:    1.  No  significant stenosis, vascular occlusion, aneurysm, or high flow AVM identified.   2.  Incidentally noted vascular variants as noted above.      NECK MRA:   1.  No significant stenosis in the neck vessels based on NASCET criteria.   2.  No evidence for dissection or pseudoaneurysm.      MR Brain w/o & w Contrast   Final Result   CONCLUSION:   HEAD MRI:    1.  No acute/subacute infarction, acute intracranial hemorrhage, mass effect, hydrocephalus, or abnormal enhancement.   2.  Interval development of multiple chronic lacunar infarctions in the bilateral cerebellar hemispheres, right greater than left.   3.  Foci of gradient susceptibility in the peripheral cerebral hemispheres as described above, likely representing chronic microhemorrhages. Peripheral distribution is suggestive of cerebral amyloid angiopathy versus chronic hypertensive microangiopathy.   4.  Mild global brain parenchymal volume loss with additional presumed sequelae of mild chronic small vessel ischemic disease.      HEAD MRA:    1.  No significant stenosis, vascular occlusion, aneurysm, or high flow AVM identified.   2.  Incidentally noted vascular variants as noted above.      NECK MRA:   1.  No significant stenosis in the neck vessels based on NASCET criteria.   2.  No evidence for dissection or pseudoaneurysm.      CT Head w/o Contrast   Final Result   IMPRESSION:   1.  No acute intracranial process.                   ECG:  NSR, rate 72, normal intervals, no acute ischemia, similar to 5/28/18 EKG    I have independently reviewed and interpreted the EKG(s) documented above     PROCEDURES:  Procedures:  none      FINAL IMPRESSION:    ICD-10-CM    1. Lightheadedness  R42        0 minutes of critical care time      I, Shiela Calvin, am serving as a scribe to document services personally performed by Dr. Monty Bates, based on my observations and the provider's statements to me. I, Monty Bates, DO attest that Shiela Calvin is acting in  a scribe capacity, has observed my performance of the services and has documented them in accordance with my direction.      Monty Bates DO  Emergency Medicine  [facility] EMERGENCY DEPARTMENT  4/3/2022  5:47 PM          Monty Bates MD  04/03/22 4942

## 2022-04-03 NOTE — ED TRIAGE NOTES
Patient presents to ED with dizziness that began yesterday afternoon.  States dizziness started gradually.  Having loose stools today.  Denies fever.  Denies headache.  States BP have been elevated.

## 2022-04-04 LAB
ATRIAL RATE - MUSE: 72 BPM
DIASTOLIC BLOOD PRESSURE - MUSE: 92 MMHG
INTERPRETATION ECG - MUSE: NORMAL
P AXIS - MUSE: 66 DEGREES
PR INTERVAL - MUSE: 150 MS
QRS DURATION - MUSE: 90 MS
QT - MUSE: 408 MS
QTC - MUSE: 446 MS
R AXIS - MUSE: 60 DEGREES
SYSTOLIC BLOOD PRESSURE - MUSE: 211 MMHG
T AXIS - MUSE: 86 DEGREES
VENTRICULAR RATE- MUSE: 72 BPM

## 2022-04-04 NOTE — DISCHARGE INSTRUCTIONS
Rest tonight and take your time first sitting up/standing if you feel lightheaded.  Please follow up as scheduled with primary clinic tomorrow to discuss everything, including ongoing blood pressure management and to help schedule cardiology follow up and outpatient echocardiogram (ultrasound of your heart).  Return to the ER sooner for new/worsening concerns including fevers >100, uncontrolled vomiting, chest pain, difficulty breathing.

## 2022-05-23 ENCOUNTER — HOSPITAL ENCOUNTER (EMERGENCY)
Facility: HOSPITAL | Age: 72
Discharge: HOME | End: 2022-05-24
Attending: EMERGENCY MEDICINE | Admitting: EMERGENCY MEDICINE
Payer: COMMERCIAL

## 2022-05-23 ENCOUNTER — APPOINTMENT (OUTPATIENT)
Dept: CT IMAGING | Facility: HOSPITAL | Age: 72
End: 2022-05-23
Attending: EMERGENCY MEDICINE
Payer: COMMERCIAL

## 2022-05-23 DIAGNOSIS — N30.00 ACUTE CYSTITIS WITHOUT HEMATURIA: ICD-10-CM

## 2022-05-23 LAB
ALBUMIN SERPL-MCNC: 3.9 G/DL (ref 3.5–5)
ALBUMIN UR-MCNC: NEGATIVE MG/DL
ALP SERPL-CCNC: 89 U/L (ref 45–120)
ALT SERPL W P-5'-P-CCNC: 22 U/L (ref 0–45)
ANION GAP SERPL CALCULATED.3IONS-SCNC: 9 MMOL/L (ref 5–18)
APPEARANCE UR: CLEAR
AST SERPL W P-5'-P-CCNC: 26 U/L (ref 0–40)
BILIRUB DIRECT SERPL-MCNC: 0.3 MG/DL
BILIRUB SERPL-MCNC: 1.3 MG/DL (ref 0–1)
BILIRUB UR QL STRIP: NEGATIVE
BUN SERPL-MCNC: 13 MG/DL (ref 8–28)
C REACTIVE PROTEIN LHE: 5.9 MG/DL (ref 0–0.8)
CALCIUM SERPL-MCNC: 10.4 MG/DL (ref 8.5–10.5)
CHLORIDE BLD-SCNC: 103 MMOL/L (ref 98–107)
CO2 SERPL-SCNC: 27 MMOL/L (ref 22–31)
COLOR UR AUTO: ABNORMAL
CREAT SERPL-MCNC: 1.1 MG/DL (ref 0.6–1.1)
ERYTHROCYTE [DISTWIDTH] IN BLOOD BY AUTOMATED COUNT: 11.9 % (ref 10–15)
GFR SERPL CREATININE-BSD FRML MDRD: 53 ML/MIN/1.73M2
GLUCOSE BLD-MCNC: 169 MG/DL (ref 70–125)
GLUCOSE UR STRIP-MCNC: 200 MG/DL
HCT VFR BLD AUTO: 46.3 % (ref 35–47)
HGB BLD-MCNC: 15.5 G/DL (ref 11.7–15.7)
HGB UR QL STRIP: NEGATIVE
HOLD SPECIMEN: NORMAL
HOLD SPECIMEN: NORMAL
HYALINE CASTS: 1 /LPF
KETONES UR STRIP-MCNC: NEGATIVE MG/DL
LEUKOCYTE ESTERASE UR QL STRIP: NEGATIVE
LIPASE SERPL-CCNC: <9 U/L (ref 0–52)
MCH RBC QN AUTO: 28.1 PG (ref 26.5–33)
MCHC RBC AUTO-ENTMCNC: 33.5 G/DL (ref 31.5–36.5)
MCV RBC AUTO: 84 FL (ref 78–100)
MUCOUS THREADS #/AREA URNS LPF: PRESENT /LPF
NITRATE UR QL: POSITIVE
PH UR STRIP: 5 [PH] (ref 5–7)
PLATELET # BLD AUTO: 219 10E3/UL (ref 150–450)
POTASSIUM BLD-SCNC: 4.5 MMOL/L (ref 3.5–5)
PROT SERPL-MCNC: 7.7 G/DL (ref 6–8)
RBC # BLD AUTO: 5.52 10E6/UL (ref 3.8–5.2)
RBC URINE: <1 /HPF
SODIUM SERPL-SCNC: 139 MMOL/L (ref 136–145)
SP GR UR STRIP: 1.01 (ref 1–1.03)
SQUAMOUS EPITHELIAL: 3 /HPF
UROBILINOGEN UR STRIP-MCNC: <2 MG/DL
WBC # BLD AUTO: 7.3 10E3/UL (ref 4–11)
WBC URINE: 3 /HPF

## 2022-05-23 PROCEDURE — 85027 COMPLETE CBC AUTOMATED: CPT | Performed by: EMERGENCY MEDICINE

## 2022-05-23 PROCEDURE — 87086 URINE CULTURE/COLONY COUNT: CPT | Performed by: EMERGENCY MEDICINE

## 2022-05-23 PROCEDURE — 74177 CT ABD & PELVIS W/CONTRAST: CPT

## 2022-05-23 PROCEDURE — 86140 C-REACTIVE PROTEIN: CPT | Performed by: EMERGENCY MEDICINE

## 2022-05-23 PROCEDURE — 258N000003 HC RX IP 258 OP 636: Performed by: EMERGENCY MEDICINE

## 2022-05-23 PROCEDURE — 250N000011 HC RX IP 250 OP 636: Performed by: EMERGENCY MEDICINE

## 2022-05-23 PROCEDURE — 83690 ASSAY OF LIPASE: CPT | Performed by: EMERGENCY MEDICINE

## 2022-05-23 PROCEDURE — 96375 TX/PRO/DX INJ NEW DRUG ADDON: CPT

## 2022-05-23 PROCEDURE — 80053 COMPREHEN METABOLIC PANEL: CPT | Performed by: EMERGENCY MEDICINE

## 2022-05-23 PROCEDURE — 96365 THER/PROPH/DIAG IV INF INIT: CPT | Mod: 59

## 2022-05-23 PROCEDURE — 81001 URINALYSIS AUTO W/SCOPE: CPT | Performed by: EMERGENCY MEDICINE

## 2022-05-23 PROCEDURE — 82248 BILIRUBIN DIRECT: CPT | Performed by: EMERGENCY MEDICINE

## 2022-05-23 PROCEDURE — 99285 EMERGENCY DEPT VISIT HI MDM: CPT | Mod: 25

## 2022-05-23 PROCEDURE — 36415 COLL VENOUS BLD VENIPUNCTURE: CPT | Performed by: EMERGENCY MEDICINE

## 2022-05-23 RX ORDER — ONDANSETRON 2 MG/ML
4 INJECTION INTRAMUSCULAR; INTRAVENOUS ONCE
Status: COMPLETED | OUTPATIENT
Start: 2022-05-23 | End: 2022-05-23

## 2022-05-23 RX ORDER — CEFTRIAXONE 2 G/1
2 INJECTION, POWDER, FOR SOLUTION INTRAMUSCULAR; INTRAVENOUS ONCE
Status: COMPLETED | OUTPATIENT
Start: 2022-05-23 | End: 2022-05-24

## 2022-05-23 RX ORDER — IOPAMIDOL 755 MG/ML
75 INJECTION, SOLUTION INTRAVASCULAR ONCE
Status: COMPLETED | OUTPATIENT
Start: 2022-05-23 | End: 2022-05-23

## 2022-05-23 RX ORDER — KETOROLAC TROMETHAMINE 30 MG/ML
15 INJECTION, SOLUTION INTRAMUSCULAR; INTRAVENOUS ONCE
Status: COMPLETED | OUTPATIENT
Start: 2022-05-23 | End: 2022-05-23

## 2022-05-23 RX ADMIN — ONDANSETRON 4 MG: 2 INJECTION INTRAMUSCULAR; INTRAVENOUS at 23:57

## 2022-05-23 RX ADMIN — IOPAMIDOL 75 ML: 755 INJECTION, SOLUTION INTRAVENOUS at 23:35

## 2022-05-23 RX ADMIN — KETOROLAC TROMETHAMINE 15 MG: 30 INJECTION, SOLUTION INTRAMUSCULAR at 23:57

## 2022-05-23 RX ADMIN — SODIUM CHLORIDE 1000 ML: 9 INJECTION, SOLUTION INTRAVENOUS at 23:54

## 2022-05-23 RX ADMIN — CEFTRIAXONE SODIUM 2 G: 2 INJECTION, POWDER, FOR SOLUTION INTRAMUSCULAR; INTRAVENOUS at 23:57

## 2022-05-23 ASSESSMENT — ENCOUNTER SYMPTOMS
ABDOMINAL PAIN: 1
DYSURIA: 1
BACK PAIN: 1
NAUSEA: 1
VOMITING: 0

## 2022-05-23 NOTE — ED TRIAGE NOTES
PT WAS DIAGNOSED WITH UTI YESTERDAY AT [facility]. PT CONTINUED COMPLAINTS OF  BURNING WITH URINATION, PAIN IN L LOWER BACK, NAUSEA, CHILLS, ABDOMINAL/PELVIC PAIN.     PYRIDIUM, NOT HELPING WITH PAIN, SOME BLOOD NOW NOTED IN URINE.

## 2022-05-23 NOTE — ED PROVIDER NOTES
"ED Provider In Triage Note  [facility]  Encounter Date: May 23, 2022    Chief Complaint   Patient presents with     Dysuria     Hematuria     Abdominal Pain     Nausea       Brief HPI:   Nichole Titus is a 72 year old female presenting to the Emergency Department with a chief complaint of urinary symptoms with hematuria. Was seen in ED in [place] yesterday and diagnosed with infection. Was started on pyridium and cefdinir. Since starting the meds, having blood in urine (urine also is orange). Reports pain in suprapubic region and in low back. Reports nausea without vomiting. No fevers, but has chills.     Brief Physical Exam:  Ht 1.549 m (5' 1\")   Wt 72.6 kg (160 lb)   BMI 30.23 kg/m    General: Non-toxic appearing  HEENT: Atraumatic  Resp: No respiratory distress; clear lungs  Cardiac: RRR  Abdomen: soft, mild tenderness to palpation LLQ; no CVAT, BL  Neuro: Alert, oriented, answers questions appropriately; cranial nerves grossly intact, no focal motor deficits  Psych: Behavior appropriate      Plan Initiated in Triage:  UA / UCx  Blood work  CT abd / pelvis    PIT Dispo:   Room when able    Angely Arredondo MD on 5/23/2022 at 6:15 PM    Patient was evaluated by the Physician in Triage due to a limitation of available rooms in the Emergency Department. A plan of care was discussed based on the information obtained on the initial evaluation and patient was consuled to return back to the Emergency Department lobby after this initial evalutaiton until results were obtained or a room became available in the Emergency Department. Patient was counseled not to leave prior to receiving the results of their workup.        Angely Arredondo MD  05/23/22 1819    "

## 2022-05-24 VITALS
SYSTOLIC BLOOD PRESSURE: 130 MMHG | TEMPERATURE: 97.6 F | RESPIRATION RATE: 16 BRPM | BODY MASS INDEX: 30.21 KG/M2 | WEIGHT: 160 LBS | HEIGHT: 61 IN | DIASTOLIC BLOOD PRESSURE: 72 MMHG | OXYGEN SATURATION: 97 % | HEART RATE: 89 BPM

## 2022-05-24 RX ORDER — OXYCODONE HYDROCHLORIDE 5 MG/1
5 TABLET ORAL EVERY 6 HOURS PRN
Qty: 6 TABLET | Refills: 0 | Status: SHIPPED | OUTPATIENT
Start: 2022-05-24 | End: 2022-05-27

## 2022-05-24 NOTE — ED NOTES
EMERGENCY DEPARTMENT SIGN OUT NOTE        ED COURSE AND MEDICAL DECISION MAKING  Patient was signed out to me by Dr Aniket Stinson at 11:50 PM     In brief, Nichole Titus is a 72 year old female who initially presented urinary symptoms with hematuria. Was evaluated yesterday and diagnosed with infection and started on Pyridium and Cefdinir. Since starting medications, urine has been orange with blood present. Mild pain to suprapubic area and low back. Nausea with no vomiting. Otherwise no other medical complaints at this time.      At time of sign out, disposition was pending CT and reassess.    CT scan only shows some inflammation of the bladder.  This consistent with her UTI.  She is on antibiotics.  Urinalysis seems to be improving.  She is already on Pyridium.  We will give her some oxycodone for pain relief at home.  Follow-up with primary.  No signs of sepsis.  Patient discharged    FINAL IMPRESSION    1. Acute cystitis without hematuria        ED MEDS  Medications   cefTRIAXone (ROCEPHIN) 2 g vial to attach to  ml bag for ADULTS or NS 50 ml bag for PEDS (2 g Intravenous New Bag 5/23/22 2357)   ketorolac (TORADOL) injection 15 mg (15 mg Intravenous Given 5/23/22 2357)   ondansetron (ZOFRAN) injection 4 mg (4 mg Intravenous Given 5/23/22 2357)   0.9% sodium chloride BOLUS (1,000 mLs Intravenous New Bag 5/23/22 2354)   iopamidol (ISOVUE-370) solution 75 mL (75 mLs Intravenous Given 5/23/22 8535)       LAB  Labs Ordered and Resulted from Time of ED Arrival to Time of ED Departure   ROUTINE UA WITH MICROSCOPIC REFLEX TO CULTURE - Abnormal       Result Value    Color Urine Kim (*)     Appearance Urine Clear      Glucose Urine 200  (*)     Bilirubin Urine Negative      Ketones Urine Negative      Specific Gravity Urine 1.015      Blood Urine Negative      pH Urine 5.0      Protein Albumin Urine Negative      Urobilinogen Urine <2.0      Nitrite Urine Positive (*)     Leukocyte Esterase Urine Negative       Mucus Urine Present (*)     RBC Urine <1      WBC Urine 3      Squamous Epithelials Urine 3 (*)     Hyaline Casts Urine 1     CBC WITH PLATELETS - Abnormal    WBC Count 7.3      RBC Count 5.52 (*)     Hemoglobin 15.5      Hematocrit 46.3      MCV 84      MCH 28.1      MCHC 33.5      RDW 11.9      Platelet Count 219     BASIC METABOLIC PANEL - Abnormal    Sodium 139      Potassium 4.5      Chloride 103      Carbon Dioxide (CO2) 27      Anion Gap 9      Urea Nitrogen 13      Creatinine 1.10      Calcium 10.4      Glucose 169 (*)     GFR Estimate 53 (*)    HEPATIC FUNCTION PANEL - Abnormal    Bilirubin Total 1.3 (*)     Bilirubin Direct 0.3      Protein Total 7.7      Albumin 3.9      Alkaline Phosphatase 89      AST 26      ALT 22     CRP INFLAMMATION - Abnormal    CRP 5.9 (*)    LIPASE - Normal    Lipase <9     URINE CULTURE       RADIOLOGY    CT Abdomen Pelvis w Contrast   Final Result   IMPRESSION:    1.  Mild urinary bladder wall thickening and perivesical fat stranding. Correlate with urinalysis to assess for cystitis.      2.  No nephrolithiasis or hydronephrosis. No evidence for pyelonephritis.          DISCHARGE MEDS  New Prescriptions    OXYCODONE (ROXICODONE) 5 MG TABLET    Take 1 tablet (5 mg) by mouth every 6 hours as needed for pain       Ambrosio Soto M.D.  [facility] EMERGENCY DEPARTMENT  [facility] [facility] [address] [phone]     Ambrosio Soto MD  05/24/22 0023

## 2022-05-24 NOTE — ED PROVIDER NOTES
EMERGENCY DEPARTMENT ENCOUNTER      NAME: Nichole Titus  AGE: 72 year old female  YOB: 1950  MRN: [id number]  EVALUATION DATE & TIME: No admission date for patient encounter.    PCP: Velvet Mckay    ED PROVIDER: Aaron Stinson M.D.      Chief Complaint   Patient presents with     Dysuria     Hematuria     Abdominal Pain     Nausea         PLAN  - follow up CT and reassess    ED COURSE & MEDICAL DECISION MAKING    ED Course as of 05/23/22 2346   Mon May 23, 2022   2228 72yoF with history of T2DM (takes insulin), HTN, HLD, hypothyroidism, CKD3 who was seen in Urgent Care yesterday and diagnosed with UTI; started on cefdinir and discharged home. Presents today with ongoing dysuria as well as LLQ pain radiating to left flank with nausea. No vomiting. Took Pyridium & Tylenol without much relief. States she has now taken 2 doses of cefdinir. Denies any black or bloody stool, chest pain, shortness of breath, any other problems or complaints at this time.    SBP 170s with otherwise normal vitals on presentation. Exam in waiting room (no ED beds) 5 hours after initial presentation with steady unaccompanied gait, clear lungs, normal work of breathing, mild LLQ & left CVA tenderness with no peritoneal signs, no peripheral edema or calf tenderness. Agree with plan to obtain CT, blood, urine; Urgent Care UA from yesterday does appear to be true UTI---no urine culture in process though. Will give Rocephin, Toradol, Zofran, IVF while obtaining workup. Patient comfortable with this plan; no further questions at this time.       --------------------------------------------------------------------------------   --------------------------------------------------------------------------------     10:37 PM I met with the patient for the initial interview and physical examination. Discussed plan for treatment and workup in the ED.    11:46 PM - Patient signed out to Dr. Soto at routine shift change. Plan at this time is  follow up CT and reassess.    This patient involved a high degree of complexity in medical decision making, as significant risks were present and assessed.    Broad differential considered for this patient presenting, including but not limited to:  Pyelo, sepsis, diverticulitis, ureteral stone, SBO, perforation, HOSSEIN, electrolyte derangement    I wore the following PPE during this patient encounter:  N95 mask, face shield w/ eye protection, gloves    MEDICATIONS GIVEN IN THE EMERGENCY DEPARTMENT  Medications   cefTRIAXone (ROCEPHIN) 2 g vial to attach to  ml bag for ADULTS or NS 50 ml bag for PEDS (has no administration in time range)   ketorolac (TORADOL) injection 15 mg (has no administration in time range)   ondansetron (ZOFRAN) injection 4 mg (has no administration in time range)   0.9% sodium chloride BOLUS (has no administration in time range)   iopamidol (ISOVUE-370) solution 75 mL (75 mLs Intravenous Given 5/23/22 0238)               =================================================================      HPI  Patient information was obtained from: Patient    Use of : N/A        Nichole Titus is a 72 year old female with a pertinent history of diabetes, hypertension, CKD who presents to this ED by private vehicle for evaluation of dysuria, abdominal pain, and nausea.     Per chart review, patient was seen at [facility] on 5/22 (~1 day ago). Urine analysis was positive for RBC, WBC, white cell clumps, and moderate amount of bacteria. They sent the urine for culture, which was pending upon patient's discharge. Started on Cefdinir two times a day for 10 days and Pyridium 100 mg three times a day for two days to cover symptoms. Discharged home in stable condition.       Patient reports that she presented to clinic yesterday as she had sudden onset of dysuria. She was diagnosed with a UTI and started on Cefdinir, which patient has been compliant with. Currently she has taken either  3-4 doses. However, patient continues to experience severe pain with urination. She also has had new onset of nausea and left lower quadrant abdominal pain with radiation into the back. She has been using Tylenol for pain management without relief. Patient unable to use Ibuprofen secondary to CKD.     Patient has a history of yeast infections. Denies any vaginal discharge. No vomiting. No rash.  Patient denies additional medical concerns or complaints at this time.         REVIEW OF SYSTEMS   Review of Systems   Gastrointestinal: Positive for abdominal pain (LLQ with radiation to back) and nausea. Negative for vomiting.   Genitourinary: Positive for dysuria. Negative for vaginal discharge.   Musculoskeletal: Positive for back pain (radiation from LLQ of abdomen).   Skin: Negative for rash.   All other systems reviewed and are negative.    All other systems reviewed and are negative except as noted above in HPI.        --------------- MEDICAL HISTORY ---------------  PAST MEDICAL HISTORY:  Past Medical History:   Diagnosis Date     Chronic kidney disease      Diabetes (H)      Hypertension      Patient Active Problem List   Diagnosis     Diabetes mellitus, type II, insulin dependent (H)     Hypothyroidism, unspecified     Hypertension, benign essential, goal below 140/90     Polycythemia     Lumbar burst fracture, with routine healing, subsequent encounter     Chronic kidney disease, stage III (moderate) (H)     Multiple-type hyperlipidemia     Depression     History of stroke     Thoracic compression fracture, closed, initial encounter (H)     Weight loss     Hypoglycemia     Aortic stenosis, moderate     Loss of consciousness due to hypoglycemia (glucose 46 per EMT)     Essential hypertension       PAST SURGICAL HISTORY:  Past Surgical History:   Procedure Laterality Date     APPENDECTOMY       CHOLECYSTECTOMY       HYSTERECTOMY  1989     LARYNGOSCOPY N/A 1/26/2016    Procedure: LARYNGOSCOPY, REMOVAL OF FOREIGN  BODY;  Surgeon: Hung VILLASEÑOR MD;  Location: [facility] OR;  Service:      NASAL FRACTURE SURGERY       OOPHORECTOMY Bilateral 1989     TONSILLECTOMY & ADENOIDECTOMY         CURRENT MEDICATIONS:      Current Facility-Administered Medications:      0.9% sodium chloride BOLUS, 1,000 mL, Intravenous, Once, Aaron Stinson MD     cefTRIAXone (ROCEPHIN) 2 g vial to attach to  ml bag for ADULTS or NS 50 ml bag for PEDS, 2 g, Intravenous, Once, Aaron Stinson MD     ketorolac (TORADOL) injection 15 mg, 15 mg, Intravenous, Once, Aaron Stinson MD     ondansetron (ZOFRAN) injection 4 mg, 4 mg, Intravenous, Once, Aaron Stinson MD    Current Outpatient Medications:      acetaminophen (TYLENOL) 500 MG tablet, [ACETAMINOPHEN (TYLENOL) 500 MG TABLET] Take 500 mg by mouth every 6 (six) hours as needed for pain., Disp: , Rfl:      acetone, urine, test Strp, [ACETONE, URINE, TEST STRP] Check urine for ketones if your BS is above 250 and not coming down with 1 bolus.  Call Dr Vora with moderate or large ketones, Disp: 50 strip, Rfl: 3     albuterol (PROAIR HFA;PROVENTIL HFA;VENTOLIN HFA) 90 mcg/actuation inhaler, [ALBUTEROL (PROAIR HFA;PROVENTIL HFA;VENTOLIN HFA) 90 MCG/ACTUATION INHALER] Inhale 2 puffs every 6 (six) hours as needed for wheezing., Disp: , Rfl:      alendronate (FOSAMAX) 70 MG tablet, [ALENDRONATE (FOSAMAX) 70 MG TABLET] Take 1 tablet (70 mg total) by mouth every 7 days. Take in the morning on an empty stomach with a full glass of water 30 minutes before food, Disp: 12 tablet, Rfl: 3     aspirin 81 MG EC tablet, [ASPIRIN 81 MG EC TABLET] Take 81 mg by mouth daily., Disp: , Rfl:      atorvastatin (LIPITOR) 20 MG tablet, [ATORVASTATIN (LIPITOR) 20 MG TABLET] Take 20 mg by mouth daily., Disp: , Rfl:      blood sugar diagnostic (CONTOUR NEXT STRIPS) Strp, [BLOOD SUGAR DIAGNOSTIC (CONTOUR NEXT STRIPS) STRP] Check blood sugars up to 6X/day, Disp: 180 strip, Rfl: 6     calcium, as carbonate,  (OS-MICHAEL) 500 mg calcium (1,250 mg) tablet, [CALCIUM, AS CARBONATE, (OS-MICHAEL) 500 MG CALCIUM (1,250 MG) TABLET] Take 1 tablet (500 mg total) by mouth 3 (three) times a day., Disp: , Rfl: 0     cholecalciferol, vitamin D3, 400 unit Tab, [CHOLECALCIFEROL, VITAMIN D3, 400 UNIT TAB] Take 1 tablet (400 Units total) by mouth daily., Disp: 30 each, Rfl: 0     clopidogrel (PLAVIX) 75 mg tablet, [CLOPIDOGREL (PLAVIX) 75 MG TABLET] Take 75 mg by mouth daily., Disp: , Rfl:      furosemide (LASIX) 40 MG tablet, [FUROSEMIDE (LASIX) 40 MG TABLET] Take 20 mg by mouth daily. , Disp: , Rfl:      gabapentin (NEURONTIN) 100 MG capsule, [GABAPENTIN (NEURONTIN) 100 MG CAPSULE] Take 100 mg by mouth 3 (three) times a day., Disp: 21 capsule, Rfl: 0     insulin aspart (NOVOLOG FLEXPEN) 100 unit/mL injection pen, [INSULIN ASPART (NOVOLOG FLEXPEN) 100 UNIT/ML INJECTION PEN] Inject 5 units with breakfast and lunch; 6 units with dinner, Disp: 5, Rfl: 1     LANTUS 100 unit/mL injection, [LANTUS 100 UNIT/ML INJECTION] Inject 25 Units under the skin bedtime. 10.9 Type 1 without complications, Disp: 10 mL, Rfl: PRN     levothyroxine (SYNTHROID, LEVOTHROID) 137 MCG tablet, [LEVOTHYROXINE (SYNTHROID, LEVOTHROID) 137 MCG TABLET] Take 1 tablet (137 mcg total) by mouth daily., Disp: 90 tablet, Rfl: 0     lidocaine (LIDODERM) 5 %, [LIDOCAINE (LIDODERM) 5 %] Place 1 patch on the skin daily. Remove & Discard patch within 12 hours or as directed by MD, Disp: , Rfl:      lisinopril (PRINIVIL,ZESTRIL) 20 MG tablet, [LISINOPRIL (PRINIVIL,ZESTRIL) 20 MG TABLET] Take 2.5 mg by mouth daily. , Disp: , Rfl:      metFORMIN (GLUCOPHAGE) 500 MG tablet, [METFORMIN (GLUCOPHAGE) 500 MG TABLET] Take 2 tablets (1,000 mg total) by mouth bedtime., Disp: , Rfl: 0     metoprolol tartrate (LOPRESSOR) 25 MG tablet, [METOPROLOL TARTRATE (LOPRESSOR) 25 MG TABLET] Take 25 mg by mouth 2 (two) times a day., Disp: , Rfl:      mirtazapine (REMERON) 15 MG tablet, [MIRTAZAPINE (REMERON) 15  MG TABLET] Take 15 mg by mouth at bedtime., Disp: , Rfl:      oxyCODONE (ROXICODONE) 5 MG immediate release tablet, [OXYCODONE (ROXICODONE) 5 MG IMMEDIATE RELEASE TABLET] Take 1 tablet (5 mg total) by mouth 2 (two) times a day as needed for pain., Disp: 30 tablet, Rfl: 0     polyethylene glycol (MIRALAX) 17 gram packet, [POLYETHYLENE GLYCOL (MIRALAX) 17 GRAM PACKET] Take 17 g by mouth daily., Disp: , Rfl:      potassium chloride SA (K-DUR,KLOR-CON) 20 MEQ tablet, [POTASSIUM CHLORIDE SA (K-DUR,KLOR-CON) 20 MEQ TABLET] Take 20 mEq by mouth 2 (two) times a day., Disp: , Rfl:      senna-docusate (PERICOLACE) 8.6-50 mg tablet, [SENNA-DOCUSATE (PERICOLACE) 8.6-50 MG TABLET] Take 1 tablet by mouth 2 (two) times a day., Disp: , Rfl: 0     sitaGLIPtin (JANUVIA) 100 MG tablet, [SITAGLIPTIN (JANUVIA) 100 MG TABLET] Take 100 mg by mouth daily., Disp: , Rfl:     ALLERGIES:  Allergies   Allergen Reactions     Codeine Itching     Prednisone Itching     Sulfa (Sulfonamide Antibiotics) [Sulfa Drugs] Itching       FAMILY HISTORY:  Family History   Problem Relation Age of Onset     Breast Cancer Mother 62.00     Diabetes Father      Glaucoma Father      Diabetes Brother      Fibromyalgia Brother      No Known Problems Daughter        SOCIAL HISTORY:   Social History     Socioeconomic History     Marital status: Single     Number of children: 1   Tobacco Use     Smoking status: Never Smoker     Smokeless tobacco: Never Used   Substance and Sexual Activity     Alcohol use: Yes     Alcohol/week: 1.7 standard drinks     Comment: Alcoholic Drinks/day: once a week     Drug use: No   Social History Narrative    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       --------------- PHYSICAL EXAM ---------------  Nursing notes and vitals reviewed by me.  VITALS:  Vitals:    05/23/22 1814 05/23/22 1819   BP:  (!) 177/68   Pulse:  71   Resp:  18   Temp:  98  F (36.7  C)   TempSrc:  Oral   SpO2:  96%   Weight: 72.6 kg (160 lb)    Height: 1.549 m  "(5' 1\")        PHYSICAL EXAM:    General:  alert, interactive, no distress  Eyes:  conjunctivae clear, conjugate gaze  HENT:  atraumatic, nose with no rhinorrhea, oropharynx clear  Neck:  no meningismus  Cardiovascular:  HR 80s during exam, regular rhythm, no murmurs, brisk cap refill  Chest:  no chest wall tenderness  Pulmonary:  no stridor, normal phonation, normal work of breathing, clear lungs bilaterally  Abdomen:  soft, nondistended, mild LLQ tenderness, mild left CVA tenderness.   :  no CVA tenderness  Back:  no midline spinal tenderness  Musculoskeletal:  no pretibial edema, no calf tenderness. Gross ROM intact to joints of extremities with no obvious deformities.  Skin:  warm, dry, no rash  Neuro:  awake, alert, answers questions appropriately, follows commands, moves all limbs  Psych:  calm, normal affect      --------------- RESULTS ---------------  LAB:  Reviewed and interpreted by me.  Results for orders placed or performed during the hospital encounter of 05/23/22   UA with Microscopic reflex to Culture    Specimen: Urine, Midstream   Result Value Ref Range    Color Urine Kim (A) Colorless, Straw, Light Yellow, Yellow    Appearance Urine Clear Clear    Glucose Urine 200  (A) Negative mg/dL    Bilirubin Urine Negative Negative    Ketones Urine Negative Negative mg/dL    Specific Gravity Urine 1.015 1.001 - 1.030    Blood Urine Negative Negative    pH Urine 5.0 5.0 - 7.0    Protein Albumin Urine Negative Negative mg/dL    Urobilinogen Urine <2.0 <2.0 mg/dL    Nitrite Urine Positive (A) Negative    Leukocyte Esterase Urine Negative Negative    Mucus Urine Present (A) None Seen /LPF    RBC Urine <1 <=2 /HPF    WBC Urine 3 <=5 /HPF    Squamous Epithelials Urine 3 (H) <=1 /HPF    Hyaline Casts Urine 1 <=2 /LPF   CBC (+ platelets, no diff)   Result Value Ref Range    WBC Count 7.3 4.0 - 11.0 10e3/uL    RBC Count 5.52 (H) 3.80 - 5.20 10e6/uL    Hemoglobin 15.5 11.7 - 15.7 g/dL    Hematocrit 46.3 35.0 - " 47.0 %    MCV 84 78 - 100 fL    MCH 28.1 26.5 - 33.0 pg    MCHC 33.5 31.5 - 36.5 g/dL    RDW 11.9 10.0 - 15.0 %    Platelet Count 219 150 - 450 10e3/uL   Basic metabolic panel   Result Value Ref Range    Sodium 139 136 - 145 mmol/L    Potassium 4.5 3.5 - 5.0 mmol/L    Chloride 103 98 - 107 mmol/L    Carbon Dioxide (CO2) 27 22 - 31 mmol/L    Anion Gap 9 5 - 18 mmol/L    Urea Nitrogen 13 8 - 28 mg/dL    Creatinine 1.10 0.60 - 1.10 mg/dL    Calcium 10.4 8.5 - 10.5 mg/dL    Glucose 169 (H) 70 - 125 mg/dL    GFR Estimate 53 (L) >60 mL/min/1.73m2   Hepatic function panel   Result Value Ref Range    Bilirubin Total 1.3 (H) 0.0 - 1.0 mg/dL    Bilirubin Direct 0.3 <=0.5 mg/dL    Protein Total 7.7 6.0 - 8.0 g/dL    Albumin 3.9 3.5 - 5.0 g/dL    Alkaline Phosphatase 89 45 - 120 U/L    AST 26 0 - 40 U/L    ALT 22 0 - 45 U/L   Result Value Ref Range    Lipase <9 0 - 52 U/L   CRP inflammation   Result Value Ref Range    CRP 5.9 (H) 0.0 - 0.8 mg/dL   Extra Blue Top Tube   Result Value Ref Range    Hold Specimen JIC    Extra Red Top Tube   Result Value Ref Range    Hold Specimen JIC        RADIOLOGY:  CT abdomen/pelvis: pending at time of patient care handoff      I, Shiela Calvin, am serving as a scribe to document services personally performed by Dr. Aaron Stinson based on my observation and the provider's statements to me. I, Aaron Stinson MD attest that Shiela Calvin is acting in a scribe capacity, has observed my performance of the services and has documented them in accordance with my direction.      Aaron Stinson MD  05/23/22  Emergency Medicine  [facility] EMERGENCY DEPARTMENT  [address] [place] [id number] [phone]  Dept: [phone]     Aaron Stinson MD  05/23/22 4118

## 2022-05-25 ENCOUNTER — PATIENT OUTREACH (OUTPATIENT)
Dept: CARE COORDINATION | Facility: CLINIC | Age: 72
End: 2022-05-25
Payer: COMMERCIAL

## 2022-05-25 DIAGNOSIS — Z71.89 OTHER SPECIFIED COUNSELING: ICD-10-CM

## 2022-05-25 LAB — BACTERIA UR CULT: NORMAL

## 2022-05-25 NOTE — PROGRESS NOTES
"Clinic Care Coordination Contact  [facility]: Post-Discharge Note  SITUATION                                                      Admission:    Admission Date: 05/23/22   Reason for Admission: Acute cystitis without hematuria  Discharge:   Discharge Date: 05/24/22  Discharge Diagnosis: Acute cystitis without hematuria    BACKGROUND                                                      Per hospital discharge summary and inpatient provider notes:  Nichole Titus is a 72 year old female with a pertinent history of diabetes, hypertension, CKD who presents to this ED by private vehicle for evaluation of dysuria, abdominal pain, and nausea.      Per chart review, patient was seen at [facility] on 5/22 (~1 day ago). Urine analysis was positive for RBC, WBC, white cell clumps, and moderate amount of bacteria. They sent the urine for culture, which was pending upon patient's discharge. Started on Cefdinir two times a day for 10 days and Pyridium 100 mg three times a day for two days to cover symptoms. Discharged home in stable condition.         Patient reports that she presented to clinic yesterday as she had sudden onset of dysuria. She was diagnosed with a UTI and started on Cefdinir, which patient has been compliant with. Currently she has taken either 3-4 doses. However, patient continues to experience severe pain with urination. She also has had new onset of nausea and left lower quadrant abdominal pain with radiation into the back. She has been using Tylenol for pain management without relief. Patient unable to use Ibuprofen secondary to CKD.      Patient has a history of yeast infections. Denies any vaginal discharge. No vomiting. No rash.  Patient denies additional medical concerns or complaints at this time.    ASSESSMENT      Enrollment  Primary Care Care Coordination Status: Declined    Discharge Assessment  How are you doing now that you are home?: \" Better but still pain in left side\"  How " are your symptoms? (Red Flag symptoms escalate to triage hotline per guidelines): Improved;Unchanged  Do you feel your condition is stable enough to be safe at home until your provider visit?: Yes  Does the patient have their discharge instructions? : Yes  Does the patient have questions regarding their discharge instructions? : No  Were you started on any new medications or were there changes to any of your previous medications? : Yes  Does the patient have all of their medications?: Yes  Do you have questions regarding any of your medications? : No  Do you have all of your needed medical supplies or equipment (DME)?  (i.e. oxygen tank, CPAP, cane, etc.): Yes  Discharge follow-up appointment scheduled within 14 calendar days? : No  Is patient agreeable to assistance with scheduling? : No    Post-op ([facility] Only)  If the patient had a surgery or procedure, do they have any questions for a nurse?: No             PLAN                                                      Outpatient Plan: Follow up.  No future appointments.      For any urgent concerns, please contact our 24 hour nurse triage line: [phone] ([phone])         Krysten Powell MA

## 2023-01-19 ENCOUNTER — APPOINTMENT (OUTPATIENT)
Dept: CT IMAGING | Facility: CLINIC | Age: 73
DRG: 638 | End: 2023-01-19
Attending: EMERGENCY MEDICINE
Payer: COMMERCIAL

## 2023-01-19 ENCOUNTER — HOSPITAL ENCOUNTER (INPATIENT)
Facility: CLINIC | Age: 73
LOS: 2 days | Discharge: HOME | DRG: 638 | End: 2023-01-22
Attending: EMERGENCY MEDICINE | Admitting: FAMILY MEDICINE
Payer: COMMERCIAL

## 2023-01-19 ENCOUNTER — APPOINTMENT (OUTPATIENT)
Dept: ULTRASOUND IMAGING | Facility: CLINIC | Age: 73
DRG: 638 | End: 2023-01-19
Attending: EMERGENCY MEDICINE
Payer: COMMERCIAL

## 2023-01-19 DIAGNOSIS — E03.9 HYPOTHYROIDISM, UNSPECIFIED: ICD-10-CM

## 2023-01-19 DIAGNOSIS — M79.10 MYALGIA: ICD-10-CM

## 2023-01-19 DIAGNOSIS — R07.9 CHEST PAIN, UNSPECIFIED TYPE: ICD-10-CM

## 2023-01-19 DIAGNOSIS — E16.2 HYPOGLYCEMIA: ICD-10-CM

## 2023-01-19 DIAGNOSIS — R74.8 ELEVATED CK: ICD-10-CM

## 2023-01-19 DIAGNOSIS — Z79.4 DIABETES MELLITUS, TYPE II, INSULIN DEPENDENT (H): Primary | Chronic | ICD-10-CM

## 2023-01-19 DIAGNOSIS — E11.9 DIABETES MELLITUS, TYPE II, INSULIN DEPENDENT (H): Primary | Chronic | ICD-10-CM

## 2023-01-19 DIAGNOSIS — R79.89 DECREASED THYROID STIMULATING HORMONE (TSH) LEVEL: ICD-10-CM

## 2023-01-19 DIAGNOSIS — I71.20 THORACIC AORTIC ANEURYSM WITHOUT RUPTURE, UNSPECIFIED PART (H): ICD-10-CM

## 2023-01-19 DIAGNOSIS — E03.9 ACQUIRED HYPOTHYROIDISM: ICD-10-CM

## 2023-01-19 LAB
ALBUMIN UR-MCNC: NEGATIVE MG/DL
ANION GAP SERPL CALCULATED.3IONS-SCNC: 9 MMOL/L (ref 5–18)
APPEARANCE UR: CLEAR
ATRIAL RATE - MUSE: 80 BPM
BACTERIA #/AREA URNS HPF: ABNORMAL /HPF
BASOPHILS # BLD AUTO: 0 10E3/UL (ref 0–0.2)
BASOPHILS NFR BLD AUTO: 1 %
BILIRUB UR QL STRIP: NEGATIVE
BUN SERPL-MCNC: 14 MG/DL (ref 8–28)
CALCIUM SERPL-MCNC: 9.2 MG/DL (ref 8.5–10.5)
CHLORIDE BLD-SCNC: 107 MMOL/L (ref 98–107)
CK SERPL-CCNC: 1724 U/L (ref 30–190)
CK SERPL-CCNC: 2430 U/L (ref 30–190)
CO2 SERPL-SCNC: 23 MMOL/L (ref 22–31)
COLOR UR AUTO: ABNORMAL
CREAT SERPL-MCNC: 1 MG/DL (ref 0.6–1.1)
DIASTOLIC BLOOD PRESSURE - MUSE: NORMAL MMHG
EOSINOPHIL # BLD AUTO: 0 10E3/UL (ref 0–0.7)
EOSINOPHIL NFR BLD AUTO: 0 %
ERYTHROCYTE [DISTWIDTH] IN BLOOD BY AUTOMATED COUNT: 12.7 % (ref 10–15)
FLUAV RNA SPEC QL NAA+PROBE: NEGATIVE
FLUBV RNA RESP QL NAA+PROBE: NEGATIVE
GFR SERPL CREATININE-BSD FRML MDRD: 60 ML/MIN/1.73M2
GLUCOSE BLD-MCNC: 129 MG/DL (ref 70–125)
GLUCOSE BLDC GLUCOMTR-MCNC: 107 MG/DL (ref 70–99)
GLUCOSE BLDC GLUCOMTR-MCNC: 256 MG/DL (ref 70–99)
GLUCOSE BLDC GLUCOMTR-MCNC: 287 MG/DL (ref 70–99)
GLUCOSE BLDC GLUCOMTR-MCNC: 49 MG/DL (ref 70–99)
GLUCOSE BLDC GLUCOMTR-MCNC: 75 MG/DL (ref 70–99)
GLUCOSE UR STRIP-MCNC: NEGATIVE MG/DL
HCT VFR BLD AUTO: 44.3 % (ref 35–47)
HGB BLD-MCNC: 15 G/DL (ref 11.7–15.7)
HGB UR QL STRIP: NEGATIVE
IMM GRANULOCYTES # BLD: 0 10E3/UL
IMM GRANULOCYTES NFR BLD: 0 %
INTERPRETATION ECG - MUSE: NORMAL
KETONES UR STRIP-MCNC: NEGATIVE MG/DL
LEUKOCYTE ESTERASE UR QL STRIP: NEGATIVE
LYMPHOCYTES # BLD AUTO: 1.8 10E3/UL (ref 0.8–5.3)
LYMPHOCYTES NFR BLD AUTO: 21 %
MAGNESIUM SERPL-MCNC: 2.3 MG/DL (ref 1.8–2.6)
MCH RBC QN AUTO: 28.6 PG (ref 26.5–33)
MCHC RBC AUTO-ENTMCNC: 33.9 G/DL (ref 31.5–36.5)
MCV RBC AUTO: 84 FL (ref 78–100)
MONOCYTES # BLD AUTO: 0.5 10E3/UL (ref 0–1.3)
MONOCYTES NFR BLD AUTO: 6 %
NEUTROPHILS # BLD AUTO: 6 10E3/UL (ref 1.6–8.3)
NEUTROPHILS NFR BLD AUTO: 72 %
NITRATE UR QL: NEGATIVE
NRBC # BLD AUTO: 0 10E3/UL
NRBC BLD AUTO-RTO: 0 /100
P AXIS - MUSE: 61 DEGREES
PH UR STRIP: 5 [PH] (ref 5–7)
PLATELET # BLD AUTO: 215 10E3/UL (ref 150–450)
POTASSIUM BLD-SCNC: 3.9 MMOL/L (ref 3.5–5)
PR INTERVAL - MUSE: 148 MS
PROLACTIN SERPL 3RD IS-MCNC: 26 NG/ML (ref 5–23)
QRS DURATION - MUSE: 86 MS
QT - MUSE: 374 MS
QTC - MUSE: 431 MS
R AXIS - MUSE: 65 DEGREES
RBC # BLD AUTO: 5.25 10E6/UL (ref 3.8–5.2)
RBC URINE: 0 /HPF
RSV RNA SPEC NAA+PROBE: NEGATIVE
SARS-COV-2 RNA RESP QL NAA+PROBE: NEGATIVE
SODIUM SERPL-SCNC: 139 MMOL/L (ref 136–145)
SP GR UR STRIP: 1.01 (ref 1–1.03)
SQUAMOUS EPITHELIAL: 3 /HPF
SYSTOLIC BLOOD PRESSURE - MUSE: NORMAL MMHG
T AXIS - MUSE: 102 DEGREES
T4 FREE SERPL-MCNC: 1.29 NG/DL (ref 0.9–1.7)
TROPONIN I SERPL-MCNC: 0.01 NG/ML (ref 0–0.29)
TROPONIN I SERPL-MCNC: <0.01 NG/ML (ref 0–0.29)
TSH SERPL DL<=0.005 MIU/L-ACNC: 0.15 UIU/ML (ref 0.3–5)
UROBILINOGEN UR STRIP-MCNC: <2 MG/DL
VENTRICULAR RATE- MUSE: 80 BPM
WBC # BLD AUTO: 8.4 10E3/UL (ref 4–11)
WBC URINE: 1 /HPF

## 2023-01-19 PROCEDURE — 82962 GLUCOSE BLOOD TEST: CPT

## 2023-01-19 PROCEDURE — 36415 COLL VENOUS BLD VENIPUNCTURE: CPT | Performed by: EMERGENCY MEDICINE

## 2023-01-19 PROCEDURE — 70450 CT HEAD/BRAIN W/O DYE: CPT

## 2023-01-19 PROCEDURE — 87637 SARSCOV2&INF A&B&RSV AMP PRB: CPT | Performed by: EMERGENCY MEDICINE

## 2023-01-19 PROCEDURE — 258N000003 HC RX IP 258 OP 636

## 2023-01-19 PROCEDURE — 85025 COMPLETE CBC W/AUTO DIFF WBC: CPT | Performed by: EMERGENCY MEDICINE

## 2023-01-19 PROCEDURE — 74174 CTA ABD&PLVS W/CONTRAST: CPT

## 2023-01-19 PROCEDURE — 250N000013 HC RX MED GY IP 250 OP 250 PS 637: Performed by: EMERGENCY MEDICINE

## 2023-01-19 PROCEDURE — 84146 ASSAY OF PROLACTIN: CPT

## 2023-01-19 PROCEDURE — 93005 ELECTROCARDIOGRAM TRACING: CPT | Performed by: EMERGENCY MEDICINE

## 2023-01-19 PROCEDURE — 96372 THER/PROPH/DIAG INJ SC/IM: CPT

## 2023-01-19 PROCEDURE — 83735 ASSAY OF MAGNESIUM: CPT | Performed by: EMERGENCY MEDICINE

## 2023-01-19 PROCEDURE — G0378 HOSPITAL OBSERVATION PER HR: HCPCS

## 2023-01-19 PROCEDURE — 80048 BASIC METABOLIC PNL TOTAL CA: CPT | Performed by: EMERGENCY MEDICINE

## 2023-01-19 PROCEDURE — 250N000012 HC RX MED GY IP 250 OP 636 PS 637

## 2023-01-19 PROCEDURE — 84443 ASSAY THYROID STIM HORMONE: CPT | Performed by: EMERGENCY MEDICINE

## 2023-01-19 PROCEDURE — 250N000013 HC RX MED GY IP 250 OP 250 PS 637

## 2023-01-19 PROCEDURE — 82550 ASSAY OF CK (CPK): CPT | Performed by: EMERGENCY MEDICINE

## 2023-01-19 PROCEDURE — 99222 1ST HOSP IP/OBS MODERATE 55: CPT

## 2023-01-19 PROCEDURE — 81001 URINALYSIS AUTO W/SCOPE: CPT | Performed by: EMERGENCY MEDICINE

## 2023-01-19 PROCEDURE — 250N000011 HC RX IP 250 OP 636: Performed by: EMERGENCY MEDICINE

## 2023-01-19 PROCEDURE — 93970 EXTREMITY STUDY: CPT

## 2023-01-19 PROCEDURE — C9803 HOPD COVID-19 SPEC COLLECT: HCPCS

## 2023-01-19 PROCEDURE — 99285 EMERGENCY DEPT VISIT HI MDM: CPT | Mod: 25,CS

## 2023-01-19 PROCEDURE — 84439 ASSAY OF FREE THYROXINE: CPT | Performed by: EMERGENCY MEDICINE

## 2023-01-19 PROCEDURE — 84484 ASSAY OF TROPONIN QUANT: CPT | Performed by: EMERGENCY MEDICINE

## 2023-01-19 RX ORDER — FUROSEMIDE 20 MG
20 TABLET ORAL DAILY
Status: DISCONTINUED | OUTPATIENT
Start: 2023-01-20 | End: 2023-01-22 | Stop reason: HOSPADM

## 2023-01-19 RX ORDER — SODIUM CHLORIDE 9 MG/ML
INJECTION, SOLUTION INTRAVENOUS CONTINUOUS
Status: DISCONTINUED | OUTPATIENT
Start: 2023-01-19 | End: 2023-01-21

## 2023-01-19 RX ORDER — LISINOPRIL 40 MG/1
40 TABLET ORAL DAILY
Status: DISCONTINUED | OUTPATIENT
Start: 2023-01-20 | End: 2023-01-22 | Stop reason: HOSPADM

## 2023-01-19 RX ORDER — DEXTROSE MONOHYDRATE 25 G/50ML
25-50 INJECTION, SOLUTION INTRAVENOUS
Status: DISCONTINUED | OUTPATIENT
Start: 2023-01-19 | End: 2023-01-22 | Stop reason: HOSPADM

## 2023-01-19 RX ORDER — INSULIN LISPRO 100 [IU]/ML
6 INJECTION, SOLUTION INTRAVENOUS; SUBCUTANEOUS
Status: ON HOLD | COMMUNITY
End: 2023-01-21

## 2023-01-19 RX ORDER — IOPAMIDOL 755 MG/ML
75 INJECTION, SOLUTION INTRAVASCULAR ONCE
Status: COMPLETED | OUTPATIENT
Start: 2023-01-19 | End: 2023-01-19

## 2023-01-19 RX ORDER — MAGNESIUM SULFATE HEPTAHYDRATE 40 MG/ML
2 INJECTION, SOLUTION INTRAVENOUS ONCE
Status: DISCONTINUED | OUTPATIENT
Start: 2023-01-19 | End: 2023-01-19

## 2023-01-19 RX ORDER — LISINOPRIL 40 MG/1
40 TABLET ORAL DAILY
COMMUNITY
End: 2023-07-24

## 2023-01-19 RX ORDER — ALENDRONATE SODIUM 70 MG/1
70 TABLET ORAL
Status: DISCONTINUED | OUTPATIENT
Start: 2023-01-22 | End: 2023-01-22 | Stop reason: HOSPADM

## 2023-01-19 RX ORDER — VITAMIN B COMPLEX
50 TABLET ORAL DAILY
Status: DISCONTINUED | OUTPATIENT
Start: 2023-01-20 | End: 2023-01-22 | Stop reason: HOSPADM

## 2023-01-19 RX ORDER — GABAPENTIN 300 MG/1
300 CAPSULE ORAL 2 TIMES DAILY
Status: DISCONTINUED | OUTPATIENT
Start: 2023-01-19 | End: 2023-01-22 | Stop reason: HOSPADM

## 2023-01-19 RX ORDER — AMLODIPINE BESYLATE 5 MG/1
5 TABLET ORAL DAILY
COMMUNITY
End: 2023-06-09

## 2023-01-19 RX ORDER — CHOLECALCIFEROL (VITAMIN D3) 50 MCG
1 TABLET ORAL DAILY
COMMUNITY
End: 2023-07-24

## 2023-01-19 RX ORDER — AMLODIPINE BESYLATE 5 MG/1
5 TABLET ORAL DAILY
Status: DISCONTINUED | OUTPATIENT
Start: 2023-01-20 | End: 2023-01-22 | Stop reason: HOSPADM

## 2023-01-19 RX ORDER — ALBUTEROL SULFATE 90 UG/1
2 AEROSOL, METERED RESPIRATORY (INHALATION) EVERY 6 HOURS PRN
Status: DISCONTINUED | OUTPATIENT
Start: 2023-01-19 | End: 2023-01-22 | Stop reason: HOSPADM

## 2023-01-19 RX ORDER — GABAPENTIN 100 MG/1
300 CAPSULE ORAL 2 TIMES DAILY
COMMUNITY
End: 2023-07-24

## 2023-01-19 RX ORDER — TIZANIDINE HYDROCHLORIDE 2 MG/1
2-4 CAPSULE, GELATIN COATED ORAL 2 TIMES DAILY PRN
COMMUNITY
End: 2023-07-24

## 2023-01-19 RX ORDER — LEVOTHYROXINE SODIUM 125 UG/1
125 TABLET ORAL
Status: DISCONTINUED | OUTPATIENT
Start: 2023-01-20 | End: 2023-01-22 | Stop reason: HOSPADM

## 2023-01-19 RX ORDER — ACETAMINOPHEN 325 MG/1
650 TABLET ORAL EVERY 6 HOURS PRN
Status: DISCONTINUED | OUTPATIENT
Start: 2023-01-19 | End: 2023-01-22 | Stop reason: HOSPADM

## 2023-01-19 RX ORDER — NICOTINE POLACRILEX 4 MG
15-30 LOZENGE BUCCAL
Status: DISCONTINUED | OUTPATIENT
Start: 2023-01-19 | End: 2023-01-22 | Stop reason: HOSPADM

## 2023-01-19 RX ORDER — TIZANIDINE 2 MG/1
2-4 TABLET ORAL 2 TIMES DAILY PRN
Status: DISCONTINUED | OUTPATIENT
Start: 2023-01-19 | End: 2023-01-22 | Stop reason: HOSPADM

## 2023-01-19 RX ADMIN — INSULIN GLARGINE 25 UNITS: 100 INJECTION, SOLUTION SUBCUTANEOUS at 21:55

## 2023-01-19 RX ADMIN — INSULIN ASPART 3 UNITS: 100 INJECTION, SOLUTION INTRAVENOUS; SUBCUTANEOUS at 18:27

## 2023-01-19 RX ADMIN — ACETAMINOPHEN 650 MG: 325 TABLET ORAL at 15:10

## 2023-01-19 RX ADMIN — IOPAMIDOL 75 ML: 755 INJECTION, SOLUTION INTRAVENOUS at 12:24

## 2023-01-19 RX ADMIN — GABAPENTIN 300 MG: 300 CAPSULE ORAL at 20:32

## 2023-01-19 RX ADMIN — SODIUM CHLORIDE: 9 INJECTION, SOLUTION INTRAVENOUS at 21:47

## 2023-01-19 RX ADMIN — SODIUM CHLORIDE: 9 INJECTION, SOLUTION INTRAVENOUS at 16:59

## 2023-01-19 RX ADMIN — TIZANIDINE 2 MG: 2 TABLET ORAL at 21:58

## 2023-01-19 ASSESSMENT — ACTIVITIES OF DAILY LIVING (ADL)
ADLS_ACUITY_SCORE: 35
ADLS_ACUITY_SCORE: 37
ADLS_ACUITY_SCORE: 22

## 2023-01-19 ASSESSMENT — ENCOUNTER SYMPTOMS
MYALGIAS: 1
DIARRHEA: 0
CHILLS: 0
ABDOMINAL PAIN: 1
FATIGUE: 0
CONSTIPATION: 0

## 2023-01-19 NOTE — PROGRESS NOTES
Brief Progress Note    Paged to neurology Dr Arrington to discuss patient case. Recommendations to monitor patient overnight, with close attention to glucose. EEG and antiepileptic not needed unless patient has recurrent seizure and then would need to be transferred as general neurology consults not done at [facility]. If no additional seizures with observation, neurology would recommend expedited outpatient follow up. Appreciated recommendations. Will discontinue neurology consult order.     Jessica Del Cid MD PGY2  [facility]  01/19/23

## 2023-01-19 NOTE — ED PROVIDER NOTES
EMERGENCY DEPARTMENT ENCOUNTER      NAME: Nichole Titus  AGE: 72 year old female  YOB: 1950  MRN: [id number]  EVALUATION DATE & TIME: No admission date for patient encounter.    PCP: Velvet Mckay    ED PROVIDER: Moiz Matute MD        Chief Complaint   Patient presents with     Chest Pain     Dental Pain     Leg Pain         FINAL IMPRESSION:  1. Myalgia    2. Elevated CK    3. Decreased thyroid stimulating hormone (TSH) level    4. Thoracic aortic aneurysm without rupture, unspecified part    5. Chest pain, unspecified type    6. Hypoglycemia          ED COURSE & MEDICAL DECISION MAKING:    Pertinent Labs & Imaging studies reviewed. (See chart for details)  72 year old female presents to the Emergency Department for evaluation of lower extremity myalgias, left-sided chest pain, sore jaw, episode of urinary incontinence overnight, jaws sore like she was biting her teeth and had bleeding from her mouth    Differential includes seizure, aortic dissection, ACS, DVT, COVID-19, myositis, URI, cauda equina, stroke    Plan for CT head based on possible seizure, CTA dissection protocol with left lower quadrant tenderness on exam, left-sided chest discomfort, jaw pain and bilateral leg pain with known aortic valve issue and aneurysm    Will obtain CK based on myalgias, metabolic panel, troponin, CBC, TSH, obtain COVID test    Obtain ultrasound lower extremities which were negative for DVTs    CTA shows slightly increased aneurysm from 2018 but no acute aortic changes.  CT head negative for intracranial hemorrhage.    CK is elevated unclear if secondary to new myalgia or Lipitor.     TSH low possible hypothyroidism myopathy    Patient's care is impacted by her diabetes and chronic kidney disease in fact she became hypoglycemic requiring intervention            11:10 AM I met with patient for initial interview and encounter. PPE worn includes N95 mask and goggles.  2:38 PM I spoke to resident Jessica regarding plan  for admission.   ED Course as of 01/19/23 1527   Thu Jan 19, 2023   3459 I was just notified by nursing that she just developed some more left-sided chest pain and is clammy appearing.  I asked that nursing get a EKG and send another troponin   1509 Reassessed patient and blood sugar was 49.  Given crackers and juice and now feeling better.  Resident doctor at bedside   1525 GLUCOSE BY METER POCT(!!): 49  Given glucose and crackers   1525 Magnesium: 2.3   1525 UA not suggestive of UTI   1525 Cauda equina unlikely based on history and exam   1525 WBC: 8.4   1525 Troponin I: 0.01  ACS unlikely     With the resident team who agrees to admit patient.  Plan for admission since unclear if patient had seizure at home as well as episodes of hypoglycemia here, myopathy, and elevated CK concerning for rhabdomyolysis      Medical Decision Making    History:    Supplemental history from: Documented in chart, if applicable    External Record(s) reviewed: Documented in chart, if applicable.    Work Up:    Chart documentation includes differential considered and any EKGs or imaging independently interpreted by provider, where specified.    In additional to work up documented, I considered the following work up: Documented in chart, if applicable.    External consultation:    Discussion of management with another provider: Documented in chart, if applicable    Complicating factors:    Care impacted by chronic illness: Chronic Kidney Disease    Care affected by social determinants of health: N/A    Disposition considerations: Admit.            At the conclusion of the encounter I discussed the results of all of the tests and the disposition. The questions were answered. The patient or family acknowledged understanding and was agreeable with the care plan.         MEDICATIONS GIVEN IN THE EMERGENCY:  Medications   acetaminophen (TYLENOL) tablet 650 mg (650 mg Oral Given 1/19/23 1510)   iopamidol (ISOVUE-370) solution 75 mL (75 mLs  "Intravenous Given 1/19/23 1224)       NEW PRESCRIPTIONS STARTED AT TODAY'S ER VISIT  New Prescriptions    No medications on file          =================================================================    HPI    Triage note  \"  Pt arrives to ED with c/o left sided chest pain, dental pain, bilateral legs pain, and generalized body aches. Pt states this morning when she woke up she woke up soiled in urine, blood all over her mouth and pillow case, lower teeth pain which she thinks it is from clenching down, chest pain, and bilateral leg pain she describes as macario horses. Denies hx of seizures. Hx of hypertension, diabetes, aortic valve replacement. Denies any shortness of breath.      Triage Assessment     Row Name 01/19/23 1016       Triage Assessment (Adult)    Airway WDL WDL       Respiratory WDL    Respiratory WDL WDL       Skin Circulation/Temperature WDL    Skin Circulation/Temperature WDL WDL       Cardiac WDL    Cardiac WDL X;chest pain       Chest Pain Assessment    Chest Pain Location anterior chest, left    Chest Pain Radiation jaw    Character pressure       Peripheral/Neurovascular WDL    Peripheral Neurovascular WDL WDL       Cognitive/Neuro/Behavioral WDL    Cognitive/Neuro/Behavioral WDL WDL              \"      Patient information was obtained from: Patient     Use of : N/A         Nichole Titus is a 72 year old female with a pertinent history of s/p AVR (2018), CKD stage 3, DM type II, hypothyroidism, HTN who presents to this ED via walk-in for evaluation of chest pain, dental pain, body aches.    Patient reports she woke up at 6:00 AM this morning with left sided chest pain which radiates to her teeth. She states that she also had some bleeding from her teeth and thinks she clenched them together. Denies any head injury. With these symptoms, she also reports onset of bilateral calf pain which feels like \"Macario horses\" this morning. Reports onset of urinary frequency and one episode " of urinary incontinence as well. In the ED, patient also endorses some abdominal pain with LLQ palpation, but denies any diarrhea or constipation. She denies any recent travel. Notes that she did fall once last week but was fine immediately after. No other recent falls. Patient denies any shortness of breath, fever, chills, vision changes, or any other complaints.      REVIEW OF SYSTEMS   Review of Systems   Constitutional: Negative for chills and fatigue.   HENT: Positive for dental problem (pain and bleeding).    Eyes: Negative for visual disturbance.   Cardiovascular: Positive for chest pain (left sided).   Gastrointestinal: Positive for abdominal pain (LLQ). Negative for constipation and diarrhea.   Musculoskeletal: Positive for myalgias (lower legs).        PAST MEDICAL HISTORY:  Past Medical History:   Diagnosis Date     Chronic kidney disease      Diabetes (H)      Hypertension        PAST SURGICAL HISTORY:  Past Surgical History:   Procedure Laterality Date     APPENDECTOMY       CHOLECYSTECTOMY       HYSTERECTOMY  1989     LARYNGOSCOPY N/A 1/26/2016    Procedure: LARYNGOSCOPY, REMOVAL OF FOREIGN BODY;  Surgeon: Hung VILLASEÑOR MD;  Location: [facility];  Service:      NASAL FRACTURE SURGERY       OOPHORECTOMY Bilateral 1989     TONSILLECTOMY & ADENOIDECTOMY             CURRENT MEDICATIONS:    acetaminophen (TYLENOL) 500 MG tablet  albuterol (PROAIR HFA;PROVENTIL HFA;VENTOLIN HFA) 90 mcg/actuation inhaler  alendronate (FOSAMAX) 70 MG tablet  amLODIPine (NORVASC) 5 MG tablet  atorvastatin (LIPITOR) 20 MG tablet  furosemide (LASIX) 40 MG tablet  gabapentin (NEURONTIN) 100 MG capsule  insulin glargine (LANTUS PEN) 100 UNIT/ML pen  insulin lispro (HUMALOG KWIKPEN) 100 UNIT/ML (1 unit dial) KWIKPEN  levothyroxine (SYNTHROID, LEVOTHROID) 137 MCG tablet  lisinopril (ZESTRIL) 40 MG tablet  tiZANidine (ZANAFLEX) 2 MG capsule  vitamin D3 (CHOLECALCIFEROL) 50 mcg (2000 units) tablet  acetone, urine, test  Strp  blood sugar diagnostic (CONTOUR NEXT STRIPS) Strp        ALLERGIES:  Allergies   Allergen Reactions     Amoxicillin Hives     Codeine Itching     Metformin Diarrhea     Prednisone Itching     Simvastatin      Other reaction(s): *Unknown     Sulfa Drugs Itching and Hives     Adhesive Tape Rash     EKG stickers and Tegaderm        FAMILY HISTORY:  Family History   Problem Relation Age of Onset     Breast Cancer Mother 62.00     Diabetes Father      Glaucoma Father      Diabetes Brother      Fibromyalgia Brother      No Known Problems Daughter        SOCIAL HISTORY:   Social History     Socioeconomic History     Marital status: Single     Number of children: 1   Tobacco Use     Smoking status: Never     Smokeless tobacco: Never   Substance and Sexual Activity     Alcohol use: Yes     Alcohol/week: 1.7 standard drinks     Comment: Alcoholic Drinks/day: once a week     Drug use: No   Social History Narrative    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       VITALS:  /64   Pulse 66   Temp 97.3  F (36.3  C) (Temporal)   Resp 18   Wt 74.8 kg (165 lb)   SpO2 97%   BMI 31.18 kg/m      PHYSICAL EXAM      Vitals: /64   Pulse 66   Temp 97.3  F (36.3  C) (Temporal)   Resp 18   Wt 74.8 kg (165 lb)   SpO2 97%   BMI 31.18 kg/m    General: Appears in no acute distress, awake, alert, interactive.  Eyes: Conjunctivae non-injected. Sclera anicteric.  HENT: Atraumatic.  No bleeding from her gums or acute fracture teeth noted.  No jaw tenderness  Neck: Supple.  Respiratory/Chest: Respiration unlabored.  Heart: Soft blowing murmur  Abdomen: non distended  Musculoskeletal: Normal extremities. No edema or erythema.  Able to lift extremities against resistance.  Reports some muscle aches when lifting her arms up.  No midline cervical thoracic or lumbar spinal tenderness  Skin: Normal color. No rash or diaphoresis.  Neurologic: Face symmetric, moves all extremities spontaneously. Speech clear.  Psychiatric:  Oriented to person, place, and time. Affect appropriate.       LAB:  All pertinent labs reviewed and interpreted.  Results for orders placed or performed during the hospital encounter of 01/19/23   CTA Chest Abdomen Pelvis w Contrast    Impression    IMPRESSION:  1.  No evidence of acute aortic syndrome or other acute findings.    2.  Fusiform dilation of the ascending aorta measuring up to 4.0 cm, minimally increased compared to 05/28/2018.     US Lower Extremity Venous Duplex Bilateral    Impression    IMPRESSION:  1.  No deep venous thrombosis in the bilateral lower extremities.   Head CT w/o contrast    Impression    IMPRESSION:  1.  No finding for intracranial hemorrhage, mass, or acute infarct.   UA with Microscopic reflex to Culture    Specimen: Urine, Clean Catch   Result Value Ref Range    Color Urine Light Yellow Colorless, Straw, Light Yellow, Yellow    Appearance Urine Clear Clear    Glucose Urine Negative Negative mg/dL    Bilirubin Urine Negative Negative    Ketones Urine Negative Negative mg/dL    Specific Gravity Urine 1.009 1.001 - 1.030    Blood Urine Negative Negative    pH Urine 5.0 5.0 - 7.0    Protein Albumin Urine Negative Negative mg/dL    Urobilinogen Urine <2.0 <2.0 mg/dL    Nitrite Urine Negative Negative    Leukocyte Esterase Urine Negative Negative    Bacteria Urine Few (A) None Seen /HPF    RBC Urine 0 <=2 /HPF    WBC Urine 1 <=5 /HPF    Squamous Epithelials Urine 3 (H) <=1 /HPF   Troponin I (now)   Result Value Ref Range    Troponin I 0.01 0.00 - 0.29 ng/mL   Basic metabolic panel   Result Value Ref Range    Sodium 139 136 - 145 mmol/L    Potassium 3.9 3.5 - 5.0 mmol/L    Chloride 107 98 - 107 mmol/L    Carbon Dioxide (CO2) 23 22 - 31 mmol/L    Anion Gap 9 5 - 18 mmol/L    Urea Nitrogen 14 8 - 28 mg/dL    Creatinine 1.00 0.60 - 1.10 mg/dL    Calcium 9.2 8.5 - 10.5 mg/dL    Glucose 129 (H) 70 - 125 mg/dL    GFR Estimate 60 (L) >60 mL/min/1.73m2   Result Value Ref Range    CK 1,724 (HH)  30 - 190 U/L   Symptomatic Influenza A/B & SARS-CoV2 (COVID-19) Virus PCR Multiplex Nasopharyngeal    Specimen: Nasopharyngeal; Swab   Result Value Ref Range    Influenza A PCR Negative Negative    Influenza B PCR Negative Negative    RSV PCR Negative Negative    SARS CoV2 PCR Negative Negative   CBC with platelets and differential   Result Value Ref Range    WBC Count 8.4 4.0 - 11.0 10e3/uL    RBC Count 5.25 (H) 3.80 - 5.20 10e6/uL    Hemoglobin 15.0 11.7 - 15.7 g/dL    Hematocrit 44.3 35.0 - 47.0 %    MCV 84 78 - 100 fL    MCH 28.6 26.5 - 33.0 pg    MCHC 33.9 31.5 - 36.5 g/dL    RDW 12.7 10.0 - 15.0 %    Platelet Count 215 150 - 450 10e3/uL    % Neutrophils 72 %    % Lymphocytes 21 %    % Monocytes 6 %    % Eosinophils 0 %    % Basophils 1 %    % Immature Granulocytes 0 %    NRBCs per 100 WBC 0 <1 /100    Absolute Neutrophils 6.0 1.6 - 8.3 10e3/uL    Absolute Lymphocytes 1.8 0.8 - 5.3 10e3/uL    Absolute Monocytes 0.5 0.0 - 1.3 10e3/uL    Absolute Eosinophils 0.0 0.0 - 0.7 10e3/uL    Absolute Basophils 0.0 0.0 - 0.2 10e3/uL    Absolute Immature Granulocytes 0.0 <=0.4 10e3/uL    Absolute NRBCs 0.0 10e3/uL   Result Value Ref Range    Magnesium 2.3 1.8 - 2.6 mg/dL   TSH with free T4 reflex   Result Value Ref Range    TSH 0.15 (L) 0.30 - 5.00 uIU/mL   Glucose by meter   Result Value Ref Range    GLUCOSE BY METER POCT 49 (LL) 70 - 99 mg/dL   ECG 12-LEAD WITH MUSE (LHE)   Result Value Ref Range    Systolic Blood Pressure  mmHg    Diastolic Blood Pressure  mmHg    Ventricular Rate 80 BPM    Atrial Rate 80 BPM    DE Interval 148 ms    QRS Duration 86 ms     ms    QTc 431 ms    P Axis 61 degrees    R AXIS 65 degrees    T Axis 102 degrees    Interpretation ECG       Sinus rhythm  Cannot rule out Anterior infarct (cited on or before 28-MAY-2018)  Abnormal ECG  When compared with ECG of 03-APR-2022 17:46,  No significant change was found  Confirmed by SEE ED PROVIDER NOTE FOR, ECG INTERPRETATION ([id number]),  Sami,  Niels ([id number]) on 1/19/2023 10:42:34 AM         RADIOLOGY:  Reviewed all pertinent imaging. Please see official radiology report.  CTA Chest Abdomen Pelvis w Contrast   Final Result   IMPRESSION:   1.  No evidence of acute aortic syndrome or other acute findings.      2.  Fusiform dilation of the ascending aorta measuring up to 4.0 cm, minimally increased compared to 05/28/2018.         Head CT w/o contrast   Final Result   IMPRESSION:   1.  No finding for intracranial hemorrhage, mass, or acute infarct.      US Lower Extremity Venous Duplex Bilateral   Final Result   IMPRESSION:   1.  No deep venous thrombosis in the bilateral lower extremities.          EKG:    Performed at: 1020    Impression:   Sinus rhythm  Cannot rule out anterior infarct (cited on or before 5/28/2018)  Nonspecific ST changes    Rate: 80  Rhythm: Sinus rhythm  Axis: 65  PA Interval: 148  QRS Interval: 86  QTc Interval: 431  ST Changes: Nonspecific ST changes  Comparison: When compared to ECG of 4/3/2022: No significant change    I have independently reviewed and interpreted the EKG(s) documented above.          I, Jose L Rosa, am serving as a scribe to document services personally performed by Chad Matute MD based on my observation and the provider's statements to me. I, Dr. Chad Matute, attest that Jose L Rosa is acting in a scribe capacity, has observed my performance of the services and has documented them in accordance with my direction.    Chad Matute MD  Emergency Medicine  [facility] EMERGENCY ROOM  [address] [facility] [address]  [phone]     Chad Matute MD  01/19/23 152

## 2023-01-19 NOTE — LETTER
[address]  [address] [facility] [address]  Phone: [phone]  Fax: [phone]    January 21, 2023        Nichole Titus  [address]   [place] [address]          To whom it may concern:    RE: Nichole Titus    Patient was seen at [facility] from 1/19-1/21.  She is to be excused from work until Monday, January 30 while she adjusts to her new treatment regimen and recovers from her hospitalization.    Please contact me for questions or concerns.      Sincerely,          Logan Suazo, DO

## 2023-01-19 NOTE — ED NOTES
Pt was resting, PT awoke with c/o chest pain and feeling sweaty and clammy. MD notified EKG and repeat labs ordered, Bedside BS checked =49, Pt given 4oz OJ and packet of matt crackers, Pt alert and oriented. Will repeat BG, [facility]FG group present and updated.

## 2023-01-19 NOTE — ED TRIAGE NOTES
Pt arrives to ED with c/o left sided chest pain, dental pain, bilateral legs pain, and generalized body aches. Pt states this morning when she woke up she woke up soiled in urine, blood all over her mouth and pillow case, lower teeth pain which she thinks it is from clenching down, chest pain, and bilateral leg pain she describes as macario horses. Denies hx of seizures. Hx of hypertension, diabetes, aortic valve replacement. Denies any shortness of breath.      Triage Assessment     Row Name 01/19/23 1016       Triage Assessment (Adult)    Airway WDL WDL       Respiratory WDL    Respiratory WDL WDL       Skin Circulation/Temperature WDL    Skin Circulation/Temperature WDL WDL       Cardiac WDL    Cardiac WDL X;chest pain       Chest Pain Assessment    Chest Pain Location anterior chest, left    Chest Pain Radiation jaw    Character pressure       Peripheral/Neurovascular WDL    Peripheral Neurovascular WDL WDL       Cognitive/Neuro/Behavioral WDL    Cognitive/Neuro/Behavioral WDL WDL

## 2023-01-19 NOTE — PHARMACY-ADMISSION MEDICATION HISTORY
Pharmacy Note - Admission Medication History    Pertinent Provider Information: None. ______________________________________________________________________    Prior To Admission (PTA) med list completed and updated in EMR.       PTA Med List   Medication Sig Note Last Dose     acetaminophen (TYLENOL) 500 MG tablet [ACETAMINOPHEN (TYLENOL) 500 MG TABLET] Take 500 mg by mouth every 6 (six) hours as needed for pain.       albuterol (PROAIR HFA;PROVENTIL HFA;VENTOLIN HFA) 90 mcg/actuation inhaler [ALBUTEROL (PROAIR HFA;PROVENTIL HFA;VENTOLIN HFA) 90 MCG/ACTUATION INHALER] Inhale 2 puffs every 6 (six) hours as needed for wheezing.   at unable to bring     alendronate (FOSAMAX) 70 MG tablet [ALENDRONATE (FOSAMAX) 70 MG TABLET] Take 1 tablet (70 mg total) by mouth every 7 days. Take in the morning on an empty stomach with a full glass of water 30 minutes before food 1/19/2023: Take on Sundays. 1/15/2023     amLODIPine (NORVASC) 5 MG tablet Take 5 mg by mouth daily  1/19/2023     atorvastatin (LIPITOR) 20 MG tablet [ATORVASTATIN (LIPITOR) 20 MG TABLET] Take 20 mg by mouth daily.  1/19/2023     furosemide (LASIX) 40 MG tablet [FUROSEMIDE (LASIX) 40 MG TABLET] Take 20 mg by mouth daily.   1/19/2023     gabapentin (NEURONTIN) 100 MG capsule Take 300 mg by mouth 2 times daily  1/19/2023 at x 1     insulin glargine (LANTUS PEN) 100 UNIT/ML pen Inject 56 Units Subcutaneous At Bedtime  1/18/2023     insulin lispro (HUMALOG KWIKPEN) 100 UNIT/ML (1 unit dial) KWIKPEN Inject 6 Units Subcutaneous 3 times daily (before meals)  1/18/2023     levothyroxine (SYNTHROID, LEVOTHROID) 137 MCG tablet [LEVOTHYROXINE (SYNTHROID, LEVOTHROID) 137 MCG TABLET] Take 1 tablet (137 mcg total) by mouth daily.  1/19/2023     lisinopril (ZESTRIL) 40 MG tablet Take 40 mg by mouth daily  1/19/2023     tiZANidine (ZANAFLEX) 2 MG capsule Take 2-4 mg by mouth 2 times daily as needed       vitamin D3 (CHOLECALCIFEROL) 50 mcg (2000 units) tablet Take 1 tablet  by mouth daily  1/19/2023     Information source(s): Patient, Clinic records, Hospital records and [facility]  Method of interview communication: in-person    Summary of Changes to PTA Med List  New: amlodipine, tizanidine  Discontinued: aspirin, Plavix, metformin, metoprolol tartrate, sitagliptin, potassium chloride  Changed: None    Patient was asked about OTC/herbal products specifically.  PTA med list reflects this.    In the past week, patient estimated taking medication this percent of the time:  greater than 90%.    Medication Affordability:  Not including over the counter (OTC) medications, was there a time in the past 12 months when you did not take your medications as prescribed because of cost?: No    Patient did not bring any medications to the hospital and can't retrieve from home. No multi-dose medications are available for use during hospital stay.     The information provided in this note is only as accurate as the sources available at the time of the update(s).    Thank you for the opportunity to participate in the care of this patient.    Zuleyma Fraser, PharmD, BCPS  1/19/2023 3:07 PM

## 2023-01-19 NOTE — H&P
[facility]    History and Physical - Hospitalist Service       Date of Admission:  1/19/2023    Assessment & Plan      Nichole Titus is a 72 year old female admitted on 1/19/2023. She has a history of DM type II, hypothyroidism, aortic valve replacement, hyperlipidemia, CKD and is admitted for elevated CK, chest pain, concern for seizure.  Patient had episode of hypoglycemia at 49 while in the ED.  Concern that hypoglycemia may have contributed to the patient's suspected seizure overnight.    Type 2 diabetes with peripheral neuropathy  Hypoglycemia  Concern for seizure given urine incontinence, elevated CK, dental pain  Concerned that patient had seizure overnight, likely secondary to hypoglycemia.  She woke up with bilateral leg pain, incontinence, bleeding from her mouth, and chest pain.  She takes 56 units of Lantus nightly along with sliding scale Humalog with meals. Glucose initially 129 in the ED, but about 3 hours after arrival had episode of symptomatic hypoglycemia at 49.  This improved to 75 with juice and crackers.  - Prolactin ordered.  Results pending  - Consulted neurology, appreciate recs   - Decreased PTA lantus to 25 units at bedtime  - Medium ssi    - Hypoglycemia protocol  - Continue PTA gabapentin 300 mg twice daily    Rhabdomyolysis possibly secondary to seizure  CKD stage 3  CK >1700 upon arrival to the ED. Cr of 1. Appears to be baseline  - NS at 200 mL/h  - Holding PTA Lasix   - Holding PTA statin    Hypothyroidism  Patient had low TSH of 0.15  - Decreased PTA levothyroxine from 137 mcg to 125    Hypertension  - Continue PTA lisinopril 40 mg  - Continue PTA amlodipine 5 mg  - Hold PTA Lasix    Ascending aortic dilation, stable  Noted on CT scan, but not much increase in size compared to previous scan in 2018  - follow up outpatient        Diet: Moderate Consistent Carb (60 g CHO per Meal) Diet  DVT Prophylaxis: Low Risk/Ambulatory with no VTE prophylaxis  indicated  Mratins Catheter: Not present  Fluids: NS @ 200 mL/h  Lines: None     Cardiac Monitoring: None  Code Status:  Full code      Disposition Plan      Expected Discharge Date: 01/20/2023                The patient's care was discussed with the Attending Physician, Dr. Bi Ayala MD.    Evan Suazo, DO PGY-1  Hospitalist Service  [facility]  Securely message with [employer] (more info)  Text page via [employer] Paging/Directory   ______________________________________________________________________    Chief Complaint   Concern for seizure    History is obtained from the patient    TSH low  CK 1800  Unclear cause - on lipitor  EEG?     History of Present Illness   Nichole Titus is a 72 year old female who has a history of DM type II, hypothyroidism, aortic valve replacement in 2018, hyperlipidemia, CKD stage III, CVA, hypertension and is admitted for elevated CK, chest pain, concern for seizure.   Patient woke up this morning and has some left-sided chest discomfort, cramping, achy pain in bilateral legs, especially in the thighs, she noted blood in her mouth and on her pillowcase, and she also realized she had become incontinent of urine.  She has never experienced anything like this in the past and does not have a history of seizures.  She lives by herself.  Upon presentation to the emergency room, she complained of continued chest pain, leg pain which made it difficult to ambulate, and lower teeth pain.  Initial labs demonstrated normal electrolytes with a GFR of 60, normal CBC, glucose of 129, low TSH of 0.15, and elevated CK of greater than 1700.  Due to the chest pain an EKG was performed that was negative for any acute ischemia and troponins thus far been negative.  A UA was obtained which was negative and the patient received a CT of her head that did not demonstrate any acute abnormalities.  CTA of the chest showed a mild increase in dilation of the ascending aorta compared to a CT  in 2018.  Venous Doppler of her lower extremities was performed and negative for DVT.  Patient was admitted to trend her CK and monitor for recurrence of symptoms.    Past Medical History    Past Medical History:   Diagnosis Date     Chronic kidney disease      Diabetes (H)      Hypertension        Past Surgical History   Past Surgical History:   Procedure Laterality Date     APPENDECTOMY       CHOLECYSTECTOMY       HYSTERECTOMY  1989     LARYNGOSCOPY N/A 1/26/2016    Procedure: LARYNGOSCOPY, REMOVAL OF FOREIGN BODY;  Surgeon: Hung VILLASEÑOR MD;  Location: [facility];  Service:      NASAL FRACTURE SURGERY       OOPHORECTOMY Bilateral 1989     TONSILLECTOMY & ADENOIDECTOMY         Prior to Admission Medications   Prior to Admission Medications   Prescriptions Last Dose Informant Patient Reported? Taking?   acetaminophen (TYLENOL) 500 MG tablet   Yes Yes   Sig: [ACETAMINOPHEN (TYLENOL) 500 MG TABLET] Take 500 mg by mouth every 6 (six) hours as needed for pain.   acetone, urine, test Strp   No No   Sig: [ACETONE, URINE, TEST STRP] Check urine for ketones if your BS is above 250 and not coming down with 1 bolus.  Call Dr Vora with moderate or large ketones   albuterol (PROAIR HFA;PROVENTIL HFA;VENTOLIN HFA) 90 mcg/actuation inhaler  at unable to bring  Yes Yes   Sig: [ALBUTEROL (PROAIR HFA;PROVENTIL HFA;VENTOLIN HFA) 90 MCG/ACTUATION INHALER] Inhale 2 puffs every 6 (six) hours as needed for wheezing.   alendronate (FOSAMAX) 70 MG tablet 1/15/2023  No Yes   Sig: [ALENDRONATE (FOSAMAX) 70 MG TABLET] Take 1 tablet (70 mg total) by mouth every 7 days. Take in the morning on an empty stomach with a full glass of water 30 minutes before food   amLODIPine (NORVASC) 5 MG tablet 1/19/2023  Yes Yes   Sig: Take 5 mg by mouth daily   atorvastatin (LIPITOR) 20 MG tablet 1/19/2023  Yes Yes   Sig: [ATORVASTATIN (LIPITOR) 20 MG TABLET] Take 20 mg by mouth daily.   blood sugar diagnostic (CONTOUR NEXT STRIPS) Strp   No No    Sig: [BLOOD SUGAR DIAGNOSTIC (CONTOUR NEXT STRIPS) STRP] Check blood sugars up to 6X/day   furosemide (LASIX) 40 MG tablet 1/19/2023  Yes Yes   Sig: [FUROSEMIDE (LASIX) 40 MG TABLET] Take 20 mg by mouth daily.    gabapentin (NEURONTIN) 100 MG capsule 1/19/2023 at x 1  Yes Yes   Sig: Take 300 mg by mouth 2 times daily   insulin glargine (LANTUS PEN) 100 UNIT/ML pen 1/18/2023  Yes Yes   Sig: Inject 56 Units Subcutaneous At Bedtime   insulin lispro (HUMALOG KWIKPEN) 100 UNIT/ML (1 unit dial) KWIKPEN 1/18/2023  Yes Yes   Sig: Inject 6 Units Subcutaneous 3 times daily (before meals)   levothyroxine (SYNTHROID, LEVOTHROID) 137 MCG tablet 1/19/2023  No Yes   Sig: [LEVOTHYROXINE (SYNTHROID, LEVOTHROID) 137 MCG TABLET] Take 1 tablet (137 mcg total) by mouth daily.   lisinopril (ZESTRIL) 40 MG tablet 1/19/2023  Yes Yes   Sig: Take 40 mg by mouth daily   tiZANidine (ZANAFLEX) 2 MG capsule   Yes Yes   Sig: Take 2-4 mg by mouth 2 times daily as needed   vitamin D3 (CHOLECALCIFEROL) 50 mcg (2000 units) tablet 1/19/2023  Yes Yes   Sig: Take 1 tablet by mouth daily      Facility-Administered Medications: None           Physical Exam   Vital Signs: Temp: 97.3  F (36.3  C) Temp src: Temporal BP: 108/64 Pulse: 66   Resp: 18 SpO2: 97 % O2 Device: None (Room air)    Weight: 165 lbs 0 oz    Constitutional: awake, alert, cooperative, no apparent distress, and appears stated age  Eyes: Lids and lashes normal, pupils equal, extra ocular muscles intact, sclera clear, conjunctiva normal  ENT: Normocephalic, without obvious abnormality, atraumatic, external ears without lesions, oral pharynx with moist mucous membranes, no areas of bleeding noted on lips, gums normal and good dentition.  Respiratory: No increased work of breathing, good air exchange, clear to auscultation bilaterally, no crackles or wheezing  Cardiovascular: Regular rate and rhythm, systolic murmur II-III/VI located at left sternal border   GI: Small area of ecchymosis in  left lower quadrant, normal bowel sounds, soft, non-distended, tender to palpation of the left upper quadrant, no masses palpated  Skin: Bruising in left lower quadrant of abdomen.  No other rashes or lesions  Musculoskeletal: There is no redness, warmth, or swelling of the joints.  Full range of motion noted, however range of motion of lower extremities causes pain throughout the legs and thighs. Tone is normal.  Neurologic: Awake, alert, oriented to name, place and time.  Cranial nerves II-XII are grossly intact. Sensory is intact.   Neuropsychiatric: General: normal, calm and normal eye contact    Data     I have personally reviewed the following data over the past 24 hrs:    8.4  \   15.0   / 215     139 107 14 /  75   3.9 23 1.00 \       TSH: 0.15 (L) T4: N/A A1C: N/A       Imaging results reviewed over the past 24 hrs:   Recent Results (from the past 24 hour(s))   US Lower Extremity Venous Duplex Bilateral    Narrative    EXAM: US LOWER EXTREMITY VENOUS DUPLEX BILATERAL  LOCATION: [facility]  DATE/TIME: 1/19/2023 12:24 PM    INDICATION: bilat calf pain  COMPARISON: None.  TECHNIQUE: Venous Duplex ultrasound of bilateral lower extremities with and without compression, augmentation and duplex. Color flow and spectral Doppler with waveform analysis performed.    FINDINGS: Exam includes the common femoral, femoral, popliteal veins as well as segmentally visualized deep calf veins and greater saphenous vein.     RIGHT: No deep vein thrombosis. No superficial thrombophlebitis. No popliteal cyst.    LEFT: No deep vein thrombosis. No superficial thrombophlebitis. No popliteal cyst.      Impression    IMPRESSION:  1.  No deep venous thrombosis in the bilateral lower extremities.   Head CT w/o contrast    Narrative    EXAM: CT HEAD W/O CONTRAST  LOCATION: [facility]  DATE/TIME: 1/19/2023 12:31 PM    INDICATION: ? seizure. Awoke soiled in urine with blood over mouth and  pillow case. Tooth pain.   COMPARISON: CT brain 4/3/2022. MRI brain 4/3/2022.  TECHNIQUE: Routine CT Head without IV contrast. Multiplanar reformats. Dose reduction techniques were used.    FINDINGS:  INTRACRANIAL CONTENTS: No finding for intracranial hemorrhage, mass, or acute infarct. Ventricles are normal in size. Normal gray-white matter differentiation. No mass effect or midline shift.    Cerebellar tonsils are normally positioned. Sella is unremarkable for technique. Corpus callosum is normally formed.    VISUALIZED ORBITS/SINUSES/MASTOIDS: Prior cataract surgeries. No paranasal sinus mucosal disease. No middle ear or mastoid effusion.    BONES/SOFT TISSUES: Calvarium is intact, without suspicious lytic or blastic foci. No scalp hematoma.      Impression    IMPRESSION:  1.  No finding for intracranial hemorrhage, mass, or acute infarct.   CTA Chest Abdomen Pelvis w Contrast    Narrative    EXAM: CTA CHEST ABDOMEN PELVIS W CONTRAST  LOCATION: [facility]  DATE/TIME: 1/19/2023 12:32 PM    INDICATION: aortic valve, chest pain, radiates to jaw, left lower quad pain and bilat leg pain  COMPARISON: CT abdomen pelvis 07/19/2022, CT chest 05/28/2019  TECHNIQUE: CT angiogram chest abdomen pelvis during arterial phase of injection of IV contrast. 2D and 3D MIP reconstructions were performed by the CT technologist. Dose reduction techniques were used.   CONTRAST: Isovue 370 75ml    FINDINGS:   CT ANGIOGRAM CHEST, ABDOMEN, AND PELVIS: Fusiform aneurysmal dilation of the ascending aorta measuring 3.7 cm at the level of the right pulmonary artery and 4.0 cm at the aortic root (previously 3.5 cm and 3.8 cm, respectively). Variant aortic arch   anatomy with direct origin of the left vertebral artery. Normal caliber descending thoracic and abdominal aorta with no evidence of dissection or intramural hematoma. Visualized portions of the great vessels and visceral branches are widely patent.   Minimal  atherosclerotic calcification of the infrarenal aorta, but no critical stenosis.    LUNGS AND PLEURA: Normal.    MEDIASTINUM/AXILLAE: No lymphadenopathy. Aortic valve replacement. Thyroid is atrophic and not well visualized. No central pulmonary emboli.    CORONARY ARTERY CALCIFICATION: Mild.    HEPATOBILIARY: Cholecystectomy. Normal liver and bile ducts.    PANCREAS: Normal.    SPLEEN: Normal.    ADRENAL GLANDS: Normal.    KIDNEYS/BLADDER: Normal.    BOWEL: Normal.    LYMPH NODES: Normal.    PELVIC ORGANS: Absent    MUSCULOSKELETAL: Median sternotomy wires. Unchanged moderate compression deformities of L3 and L4. Mild degenerative changes in the spine.      Impression    IMPRESSION:  1.  No evidence of acute aortic syndrome or other acute findings.    2.  Fusiform dilation of the ascending aorta measuring up to 4.0 cm, minimally increased compared to 05/28/2018.

## 2023-01-20 PROBLEM — R07.9 CHEST PAIN, UNSPECIFIED TYPE: Status: ACTIVE | Noted: 2023-01-20

## 2023-01-20 PROBLEM — R74.8 ELEVATED CK: Status: ACTIVE | Noted: 2023-01-20

## 2023-01-20 PROBLEM — R79.89 DECREASED THYROID STIMULATING HORMONE (TSH) LEVEL: Status: ACTIVE | Noted: 2023-01-20

## 2023-01-20 PROBLEM — I71.20 THORACIC AORTIC ANEURYSM WITHOUT RUPTURE, UNSPECIFIED PART (H): Status: ACTIVE | Noted: 2023-01-20

## 2023-01-20 PROBLEM — E16.2 HYPOGLYCEMIA: Status: ACTIVE | Noted: 2017-01-06

## 2023-01-20 PROBLEM — M79.10 MYALGIA: Status: ACTIVE | Noted: 2023-01-20

## 2023-01-20 LAB
AMPHETAMINES UR QL SCN: NORMAL
ANION GAP SERPL CALCULATED.3IONS-SCNC: 7 MMOL/L (ref 5–18)
BARBITURATES UR QL: NORMAL
BENZODIAZ UR QL: NORMAL
BUN SERPL-MCNC: 13 MG/DL (ref 8–28)
CALCIUM SERPL-MCNC: 8.3 MG/DL (ref 8.5–10.5)
CANNABINOIDS UR QL SCN: NORMAL
CHLORIDE BLD-SCNC: 114 MMOL/L (ref 98–107)
CK SERPL-CCNC: 1336 U/L (ref 30–190)
CO2 SERPL-SCNC: 21 MMOL/L (ref 22–31)
COCAINE UR QL: NORMAL
CREAT SERPL-MCNC: 0.72 MG/DL (ref 0.6–1.1)
CREAT UR-MCNC: 20 MG/DL
ERYTHROCYTE [DISTWIDTH] IN BLOOD BY AUTOMATED COUNT: 12.8 % (ref 10–15)
GFR SERPL CREATININE-BSD FRML MDRD: 89 ML/MIN/1.73M2
GLUCOSE BLD-MCNC: 49 MG/DL (ref 70–125)
GLUCOSE BLDC GLUCOMTR-MCNC: 123 MG/DL (ref 70–99)
GLUCOSE BLDC GLUCOMTR-MCNC: 283 MG/DL (ref 70–99)
GLUCOSE BLDC GLUCOMTR-MCNC: 289 MG/DL (ref 70–99)
GLUCOSE BLDC GLUCOMTR-MCNC: 329 MG/DL (ref 70–99)
GLUCOSE BLDC GLUCOMTR-MCNC: 380 MG/DL (ref 70–99)
GLUCOSE BLDC GLUCOMTR-MCNC: 58 MG/DL (ref 70–99)
GLUCOSE BLDC GLUCOMTR-MCNC: 69 MG/DL (ref 70–99)
HCT VFR BLD AUTO: 40.2 % (ref 35–47)
HGB BLD-MCNC: 13.3 G/DL (ref 11.7–15.7)
MCH RBC QN AUTO: 28.7 PG (ref 26.5–33)
MCHC RBC AUTO-ENTMCNC: 33.1 G/DL (ref 31.5–36.5)
MCV RBC AUTO: 87 FL (ref 78–100)
OPIATES UR QL SCN: NORMAL
OXYCODONE UR QL: NORMAL
PCP UR QL SCN: NORMAL
PLATELET # BLD AUTO: 175 10E3/UL (ref 150–450)
POTASSIUM BLD-SCNC: 3.5 MMOL/L (ref 3.5–5)
RBC # BLD AUTO: 4.63 10E6/UL (ref 3.8–5.2)
SODIUM SERPL-SCNC: 142 MMOL/L (ref 136–145)
WBC # BLD AUTO: 7.1 10E3/UL (ref 4–11)

## 2023-01-20 PROCEDURE — 82962 GLUCOSE BLOOD TEST: CPT

## 2023-01-20 PROCEDURE — 258N000003 HC RX IP 258 OP 636

## 2023-01-20 PROCEDURE — 120N000001 HC R&B MED SURG/OB

## 2023-01-20 PROCEDURE — 80307 DRUG TEST PRSMV CHEM ANLYZR: CPT | Performed by: STUDENT IN AN ORGANIZED HEALTH CARE EDUCATION/TRAINING PROGRAM

## 2023-01-20 PROCEDURE — 250N000013 HC RX MED GY IP 250 OP 250 PS 637

## 2023-01-20 PROCEDURE — 84681 ASSAY OF C-PEPTIDE: CPT | Performed by: STUDENT IN AN ORGANIZED HEALTH CARE EDUCATION/TRAINING PROGRAM

## 2023-01-20 PROCEDURE — 250N000013 HC RX MED GY IP 250 OP 250 PS 637: Performed by: EMERGENCY MEDICINE

## 2023-01-20 PROCEDURE — 36415 COLL VENOUS BLD VENIPUNCTURE: CPT

## 2023-01-20 PROCEDURE — G0378 HOSPITAL OBSERVATION PER HR: HCPCS

## 2023-01-20 PROCEDURE — 36415 COLL VENOUS BLD VENIPUNCTURE: CPT | Performed by: STUDENT IN AN ORGANIZED HEALTH CARE EDUCATION/TRAINING PROGRAM

## 2023-01-20 PROCEDURE — 99232 SBSQ HOSP IP/OBS MODERATE 35: CPT

## 2023-01-20 PROCEDURE — 80048 BASIC METABOLIC PNL TOTAL CA: CPT

## 2023-01-20 PROCEDURE — 85027 COMPLETE CBC AUTOMATED: CPT

## 2023-01-20 PROCEDURE — 82550 ASSAY OF CK (CPK): CPT

## 2023-01-20 RX ORDER — ACETAMINOPHEN 500 MG
500 TABLET ORAL EVERY 6 HOURS PRN
Status: DISCONTINUED | OUTPATIENT
Start: 2023-01-20 | End: 2023-01-22 | Stop reason: HOSPADM

## 2023-01-20 RX ADMIN — ACETAMINOPHEN 500 MG: 500 TABLET ORAL at 13:29

## 2023-01-20 RX ADMIN — AMLODIPINE BESYLATE 5 MG: 5 TABLET ORAL at 08:57

## 2023-01-20 RX ADMIN — SODIUM CHLORIDE: 9 INJECTION, SOLUTION INTRAVENOUS at 02:43

## 2023-01-20 RX ADMIN — INSULIN ASPART 3 UNITS: 100 INJECTION, SOLUTION INTRAVENOUS; SUBCUTANEOUS at 12:19

## 2023-01-20 RX ADMIN — ACETAMINOPHEN 650 MG: 325 TABLET ORAL at 00:51

## 2023-01-20 RX ADMIN — ACETAMINOPHEN 650 MG: 325 TABLET ORAL at 19:22

## 2023-01-20 RX ADMIN — LISINOPRIL 40 MG: 40 TABLET ORAL at 08:57

## 2023-01-20 RX ADMIN — SODIUM CHLORIDE: 9 INJECTION, SOLUTION INTRAVENOUS at 14:21

## 2023-01-20 RX ADMIN — GABAPENTIN 300 MG: 300 CAPSULE ORAL at 08:57

## 2023-01-20 RX ADMIN — TIZANIDINE 4 MG: 2 TABLET ORAL at 21:06

## 2023-01-20 RX ADMIN — GABAPENTIN 300 MG: 300 CAPSULE ORAL at 19:22

## 2023-01-20 RX ADMIN — LEVOTHYROXINE SODIUM 125 MCG: 125 TABLET ORAL at 08:56

## 2023-01-20 RX ADMIN — INSULIN ASPART 3 UNITS: 100 INJECTION, SOLUTION INTRAVENOUS; SUBCUTANEOUS at 17:30

## 2023-01-20 RX ADMIN — SODIUM CHLORIDE: 9 INJECTION, SOLUTION INTRAVENOUS at 19:23

## 2023-01-20 RX ADMIN — CHOLECALCIFEROL TAB 25 MCG (1000 UNIT) 50 MCG: 25 TAB at 08:56

## 2023-01-20 ASSESSMENT — ACTIVITIES OF DAILY LIVING (ADL)
DEPENDENT_IADLS:: INDEPENDENT
ADLS_ACUITY_SCORE: 22

## 2023-01-20 NOTE — PROVIDER NOTIFICATION
Resident on call paged. Pt here for possible seizure, would you like seizure pads ordered? Can you clarify if pt needs tele.    Seizure pads ordered. No tele orders placed. Will initiate seizure precautions

## 2023-01-20 NOTE — PLAN OF CARE
PRIMARY DIAGNOSIS: GENERALIZED WEAKNESS    OUTPATIENT/OBSERVATION GOALS TO BE MET BEFORE DISCHARGE  1. Orthostatic performed: N/A    2. Tolerating PO medications: Yes    3. Return to near baseline physical activity: Yes    4. Cleared for discharge by consultants (if involved): N/A    Discharge Planner Nurse   Safe discharge environment identified: Yes  Barriers to discharge: Yes       Entered by: Nilda Fontenot RN 01/20/2023 11:24 AM     Please review provider order for any additional goals.   Nurse to notify provider when observation goals have been met and patient is ready for discharge.

## 2023-01-20 NOTE — PLAN OF CARE
Goal: Optimal Comfort and Wellbeing  Outcome: Progressing     Patient's pain managed with PRN Tylenol. Generalized weakness present to lower extremities. Urine sample sent. VSS.

## 2023-01-20 NOTE — PLAN OF CARE
Goal Outcome Evaluation:    PRIMARY DIAGNOSIS: GENERALIZED WEAKNESS    OUTPATIENT/OBSERVATION GOALS TO BE MET BEFORE DISCHARGE  1. Orthostatic performed: N/A    2. Tolerating PO medications: Yes    3. Return to near baseline physical activity: SBA, generalized weakness in lower extremities    4. Cleared for discharge by consultants (if involved): No    Discharge Planner Nurse   Safe discharge environment identified: Yes  Barriers to discharge: Yes; monitoring overnight       Entered by: Pauline Manuel RN 01/20/2023 5:36 AM     Please review provider order for any additional goals.   Nurse to notify provider when observation goals have been met and patient is ready for discharge.

## 2023-01-20 NOTE — UTILIZATION REVIEW
Admission Status; Secondary Review Determination   Under the authority of the Utilization Management Committee, the utilization review process indicated a secondary review on Nichole Titus. The review outcome is based on review of the medical records, discussions with staff, and applying clinical experience noted on the date of the review.   (x) Inpatient Status Appropriate - This patient's medical care is consistent with medical management for inpatient care and reasonable inpatient medical practice.     RATIONALE FOR DETERMINATION   Nichole Titus is a 72 yr old female with DM2, hypothyroidism, s/p AVR, HLD, CKD who presented on 1/19/23 with chest pain and found to have elevated CK suspected due to seizure.  Also with episode of hypoglycemia to 49.  Suspect the hypoglycemia is causing seizures.  CK on presentation 1700s with climb to 2430 on recheck and now continues > 1000 at 1336.  IVF ongoing.  Very labile Bps---need to hold some meds due to elevated CK and risk to renal function with underlying CKD.  Insulin doses decreased to less than 1/2 home dose and again this AM 1/20/23 with hypoglycemia event to 58.  Has crossed 1 MN in Observation with ongoing needs for IVF and close glucose monitoring with seizure activity related to hypoglycemia causing acute rhabdo with underlying CKD.    At the time of admission with the information available to the attending physician more than 2 nights Hospital complex care was anticipated, based on patient risk of adverse outcome if treated as outpatient and complex care required. Inpatient admission is appropriate based on the Medicare guidelines.   The information on this document is developed by the utilization review team in order for the business office to ensure compliance. This only denotes the appropriateness of proper admission status and does not reflect the quality of care rendered.   The definitions of Inpatient Status and Observation Status used in making the  determination above are those provided in the CMS Coverage Manual, Chapter 1 and Chapter 6, section 70.4.   Sincerely,   Nilda Martinez MD  Utilization Review  Physician Advisor  [facility]

## 2023-01-20 NOTE — PROVIDER NOTIFICATION
Critical lab results. BG 49, gave juice and snack. Will recheck per protocol. CK 1,336, down from yesterday's lab 2,430.

## 2023-01-20 NOTE — PLAN OF CARE
PRIMARY DIAGNOSIS: GENERALIZED WEAKNESS    OUTPATIENT/OBSERVATION GOALS TO BE MET BEFORE DISCHARGE  1. Orthostatic performed: N/A    2. Tolerating PO medications: Yes    3. Return to near baseline physical activity: SBA, increased weakness from baseline per pt report    4. Cleared for discharge by consultants (if involved): No    Discharge Planner Nurse   Safe discharge environment identified: Yes  Barriers to discharge: Yes; monitoring overnight for seizure activity       Entered by: Pauline Manuel RN 01/20/2023 12:59 AM     Please review provider order for any additional goals.   Nurse to notify provider when observation goals have been met and patient is ready for discharge.Goal Outcome Evaluation:

## 2023-01-20 NOTE — PROGRESS NOTES
[facility]    Progress Note - Hospitalist Service       Date of Admission:  1/19/2023    Assessment & Plan     Nichole Titus is a 72 year old female admitted on 1/19/2023. She has a history of DM type II, hypothyroidism, aortic valve replacement, hyperlipidemia, CKD and is admitted for elevated CK, chest pain, concern for seizure.  Patient had episode of hypoglycemia at 49 while in the ED.  Concern that hypoglycemia may have contributed to the patient's suspected seizure overnight. Patient was hypoglycemic again the morning of 1/20, but did not have any seizures.     Type 2 diabetes with peripheral neuropathy  Hypoglycemia  Concern for seizure given urine incontinence, elevated CK, dental pain  Lantus was decreased to 25 units last night, and patient was still hypoglycemic this morning at 49.  Patient did not have any seizures overnight.  Prolactin elevated at 26.  UDS negative  - Consulted neurology, who recommended close outpatient follow-up.  If patient has another seizure, will discuss transfer  - Decreased PTA lantus to 15 units at bedtime  - Medium ssi    - Hypoglycemia protocol  - Continue PTA gabapentin 300 mg twice daily  - Will recommend freestyle gaston 2 and GLP-1 inhibitor upon discharge    Rhabdomyolysis possibly secondary to seizure  CKD stage 3  CK >1700 upon arrival to the ED. Cr of 1. Appears to be baseline  - NS at 200 mL/h  - Holding PTA Lasix   - Holding PTA statin    Hypothyroidism  Patient had low TSH of 0.15 upon admission  - Decreased PTA levothyroxine from 137 mcg to 125    Hypertension  - Continue PTA lisinopril 40 mg  - Continue PTA amlodipine 5 mg  - Hold PTA Lasix    Ascending aortic dilation, stable  Noted on CT scan, but not much increase in size compared to previous scan in 2018  - follow up outpatient        Diet: Moderate Consistent Carb (60 g CHO per Meal) Diet    DVT Prophylaxis: Low Risk/Ambulatory with no VTE prophylaxis indicated  Martins Catheter:  Not present  Fluids: NS @ 200 mL/h  Lines: None     Cardiac Monitoring: None  Code Status:    DNI, CPR okay. Wants to have quality of life preserved    Disposition Plan      Expected Discharge Date: 01/21/2023      Destination: home          The patient's care was discussed with the Attending Physician, Dr. Bi Ayala MD.    Logan Suazo, DO  Hospitalist Service  [facility]  Securely message with [employer] (more info)  Text page via [employer] Paging/Directory   ______________________________________________________________________    Interval History   Patient was sitting up in a chair during my encounter today.  She did not have any seizures overnight, however she was hypoglycemic at 49 again.  We discussed changes need to be made to her diabetic medication regimen, and she was in agreement.  She states the pain in her legs are improving, but definitely still present, right > left.  She has no other complaints at this time.    Physical Exam   Vital Signs: Temp: 98.5  F (36.9  C) Temp src: Oral BP: (!) 151/71 Pulse: 60   Resp: 16 SpO2: 98 % O2 Device: None (Room air)    Weight: 163 lbs 9.6 oz  Constitutional: awake, alert, cooperative, no apparent distress, and appears stated age  Eyes: Lids and lashes normal, pupils equal, extra ocular muscles intact, sclera clear, conjunctiva normal, corrective glasses in place  ENT: Normocephalic, without obvious abnormality, atraumatic, external ears without lesions, no areas of bleeding noted on lips, gums normal and good dentition.  Respiratory: No increased work of breathing, good air exchange, clear to auscultation bilaterally, no crackles or wheezing  Cardiovascular: Regular rate and rhythm, systolic murmur II-III/VI located at left sternal border   GI: Small area of ecchymosis in left lower quadrant, normal bowel sounds, soft, non-distended, tender to palpation of the left upper quadrant, no masses palpated  Skin: Bruising in left lower quadrant of  abdomen.  No other rashes or lesions  Musculoskeletal: There is no redness, warmth, or swelling of the joints.  Full range of motion noted, however range of motion of lower extremities causes pain throughout the legs and thighs, improved from yesterday. Tone is normal.  Neurologic: Awake, alert, oriented to name, place and time. Sensory is intact.   Neuropsychiatric: General: normal, calm and normal eye contact          Data     I have personally reviewed the following data over the past 24 hrs:    7.1  \   13.3   / 175     142 114 (H) 13 /  283 (H)   3.5 21 (L) 0.72 \       TSH: N/A T4: N/A A1C: N/A       Imaging results reviewed over the past 24 hrs:   No results found for this or any previous visit (from the past 24 hour(s)).

## 2023-01-20 NOTE — CONSULTS
Care Management Initial Consult    General Information  Assessment completed with: Patient,    Type of CM/SW Visit: Initial Assessment    Primary Care Provider verified and updated as needed:     Readmission within the last 30 days: no previous admission in last 30 days      Reason for Consult: discharge planning  Advance Care Planning:            Communication Assessment  Patient's communication style: spoken language (English or Bilingual)    Hearing Difficulty or Deaf: no   Wear Glasses or Blind: yes    Cognitive  Cognitive/Neuro/Behavioral: WDL                      Living Environment:   People in home: alone     Current living Arrangements: apartment      Able to return to prior arrangements: yes       Family/Social Support:  Care provided by: self  Provides care for: pet(s)  Marital Status: Single  Children, Neighbor          Description of Support System: Supportive, Involved    Support Assessment: Adequate family and caregiver support    Current Resources:   Patient receiving home care services: No     Community Resources: None  Equipment currently used at home: none  Supplies currently used at home: None    Employment/Financial:  Employment Status: employed full-time, 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)     Employment/ Comments: Intends to cut down to 40 hrs/wk in September 2023  Financial Concerns: No concerns identified   Referral to Financial Worker: No       Lifestyle & Psychosocial Needs:  Social Determinants of Health     Tobacco Use: Low Risk      Smoking Tobacco Use: Never     Smokeless Tobacco Use: Never     Passive Exposure: Not on file   Alcohol Use: Not on file   Financial Resource Strain: Not on file   Food Insecurity: Not on file   Transportation Needs: Not on file   Physical Activity: Not on file   Stress: Not on file   Social Connections: Not on file   Intimate Partner Violence: Not on file   Depression: Not on file   Housing Stability: Not on file       Functional Status:  Prior to  admission patient needed assistance:   Dependent ADLs:: Independent  Dependent IADLs:: Independent       Mental Health Status:  Mental Health Status: No Current Concerns       Chemical Dependency Status:  Chemical Dependency Status: No Current Concerns             Values/Beliefs:  Spiritual, Cultural Beliefs, Adventist Practices, Values that affect care: no               Additional Information:  9:01 AM  Initial CM assessment completed. TIMOTHY met and introduced self and CM services to Pt. Pt lives in an apartment alone. Pt does own one dog and two cats that she takes care of. Pt is independent without needing equipment. 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 Pt states that she has a close relationship with her daughter who lives in [facility] nearby. Pt reports that she plans to move in with her daughter back into [facility] once Pt's two young adult grandchildren move out within the next year. Pt states that her PCP left her primary clinic but plans to establish care with a different provider at the clinic. Pt denies having a HCD but is agreeable to CM providing resources on creating one. CAMEJO completed and placed in Pt's chart. SW provided a copy of the MOON and information for creating a HCD to Pt. Pt states her daughter will be able to transport at discharge. Pt declined having any questions or needs from CM at this time.    LEEROY Barron

## 2023-01-21 LAB
ANION GAP SERPL CALCULATED.3IONS-SCNC: 5 MMOL/L (ref 5–18)
BUN SERPL-MCNC: 11 MG/DL (ref 8–28)
CALCIUM SERPL-MCNC: 9 MG/DL (ref 8.5–10.5)
CHLORIDE BLD-SCNC: 115 MMOL/L (ref 98–107)
CK SERPL-CCNC: 533 U/L (ref 30–190)
CO2 SERPL-SCNC: 22 MMOL/L (ref 22–31)
CREAT SERPL-MCNC: 0.78 MG/DL (ref 0.6–1.1)
GFR SERPL CREATININE-BSD FRML MDRD: 81 ML/MIN/1.73M2
GLUCOSE BLD-MCNC: 202 MG/DL (ref 70–125)
GLUCOSE BLDC GLUCOMTR-MCNC: 128 MG/DL (ref 70–99)
GLUCOSE BLDC GLUCOMTR-MCNC: 218 MG/DL (ref 70–99)
GLUCOSE BLDC GLUCOMTR-MCNC: 228 MG/DL (ref 70–99)
GLUCOSE BLDC GLUCOMTR-MCNC: 314 MG/DL (ref 70–99)
GLUCOSE BLDC GLUCOMTR-MCNC: 319 MG/DL (ref 70–99)
HOLD SPECIMEN: NORMAL
POTASSIUM BLD-SCNC: 4.3 MMOL/L (ref 3.5–5)
SODIUM SERPL-SCNC: 142 MMOL/L (ref 136–145)

## 2023-01-21 PROCEDURE — 258N000003 HC RX IP 258 OP 636

## 2023-01-21 PROCEDURE — 99232 SBSQ HOSP IP/OBS MODERATE 35: CPT

## 2023-01-21 PROCEDURE — 36415 COLL VENOUS BLD VENIPUNCTURE: CPT

## 2023-01-21 PROCEDURE — 82550 ASSAY OF CK (CPK): CPT

## 2023-01-21 PROCEDURE — 80048 BASIC METABOLIC PNL TOTAL CA: CPT

## 2023-01-21 PROCEDURE — 120N000001 HC R&B MED SURG/OB

## 2023-01-21 PROCEDURE — 250N000013 HC RX MED GY IP 250 OP 250 PS 637: Performed by: EMERGENCY MEDICINE

## 2023-01-21 PROCEDURE — 250N000013 HC RX MED GY IP 250 OP 250 PS 637

## 2023-01-21 RX ORDER — LEVOTHYROXINE SODIUM 125 UG/1
125 TABLET ORAL
Qty: 90 TABLET | Refills: 0 | Status: SHIPPED | OUTPATIENT
Start: 2023-01-22 | End: 2023-05-01

## 2023-01-21 RX ORDER — INSULIN LISPRO 100 [IU]/ML
5 INJECTION, SOLUTION INTRAVENOUS; SUBCUTANEOUS
Qty: 15 ML | Refills: 0 | Status: SHIPPED | OUTPATIENT
Start: 2023-01-21 | End: 2023-02-03

## 2023-01-21 RX ADMIN — GABAPENTIN 300 MG: 300 CAPSULE ORAL at 09:56

## 2023-01-21 RX ADMIN — AMLODIPINE BESYLATE 5 MG: 5 TABLET ORAL at 09:56

## 2023-01-21 RX ADMIN — SODIUM CHLORIDE: 9 INJECTION, SOLUTION INTRAVENOUS at 00:30

## 2023-01-21 RX ADMIN — ALBUTEROL SULFATE 2 PUFF: 90 AEROSOL, METERED RESPIRATORY (INHALATION) at 22:05

## 2023-01-21 RX ADMIN — INSULIN ASPART 2 UNITS: 100 INJECTION, SOLUTION INTRAVENOUS; SUBCUTANEOUS at 13:08

## 2023-01-21 RX ADMIN — GABAPENTIN 300 MG: 300 CAPSULE ORAL at 20:15

## 2023-01-21 RX ADMIN — LEVOTHYROXINE SODIUM 125 MCG: 125 TABLET ORAL at 09:55

## 2023-01-21 RX ADMIN — INSULIN ASPART 2 UNITS: 100 INJECTION, SOLUTION INTRAVENOUS; SUBCUTANEOUS at 17:35

## 2023-01-21 RX ADMIN — TIZANIDINE 4 MG: 2 TABLET ORAL at 22:19

## 2023-01-21 RX ADMIN — SODIUM CHLORIDE: 9 INJECTION, SOLUTION INTRAVENOUS at 04:40

## 2023-01-21 RX ADMIN — ACETAMINOPHEN 650 MG: 325 TABLET ORAL at 22:19

## 2023-01-21 RX ADMIN — CHOLECALCIFEROL TAB 25 MCG (1000 UNIT) 50 MCG: 25 TAB at 09:55

## 2023-01-21 RX ADMIN — LISINOPRIL 40 MG: 40 TABLET ORAL at 09:56

## 2023-01-21 RX ADMIN — ACETAMINOPHEN 650 MG: 325 TABLET ORAL at 04:36

## 2023-01-21 RX ADMIN — ACETAMINOPHEN 500 MG: 500 TABLET ORAL at 13:59

## 2023-01-21 ASSESSMENT — ACTIVITIES OF DAILY LIVING (ADL)
ADLS_ACUITY_SCORE: 22

## 2023-01-21 NOTE — PLAN OF CARE
Problem: Plan of Care - These are the overarching goals to be used throughout the patient stay.    Goal: Readiness for Transition of Care  Outcome: Progressing   Goal Outcome Evaluation:                  /61 (BP Location: Right arm)   Pulse 62   Temp 97.6  F (36.4  C) (Oral)   Resp 16   Wt 74.2 kg (163 lb 9.6 oz)   SpO2 95%   BMI 30.91 kg/m      Pt AOx4. Up with stand by assist. Pt appeared to sleep well overnight, cares clustered.    Barak Remy RN

## 2023-01-21 NOTE — PLAN OF CARE
Goal: Optimal Comfort and Wellbeing  Outcome: Progressing     Patient received PRN Tylenol for lower extremity pain. CK trending down. Ambulating stand by assist. Tolerating oral intake, continuous fluids discontinued. VSS.

## 2023-01-21 NOTE — PROGRESS NOTES
[facility]    Progress Note - Hospitalist Service       Date of Admission:  1/19/2023    Assessment & Plan     Nichole Titus is a 72 year old female admitted on 1/19/2023. She has a history of DM type II, hypothyroidism, aortic valve replacement, hyperlipidemia, CKD and is admitted for elevated CK, chest pain, concern for seizure. Patient had episode of hypoglycemia at 50 while in the ED. Concern that hypoglycemia may have contributed to the patient's suspected seizure overnight. Patient was hypoglycemic again the morning of 1/20, but did not have any seizures. Morning of 1/21, patient was not hypoglycemic.    Type 2 diabetes with peripheral neuropathy  Hypoglycemia, improved  Concern for seizure given urine incontinence, elevated CK, dental pain  Lantus was decreased to 15 units last night, and patient did not have hypoglycemia overnight or this morning.  Patient again did not have any seizures overnight.  Prolactin elevated at 26.  UDS negative  - Consulted neurology, who recommended close outpatient follow-up.  If patient has another seizure, will discuss transfer  - Continue PTA lantus to 15 units at bedtime  - Medium ssi    - Will discharge with instructions to use Humalog 5 units 3 times daily with meals  - Hypoglycemia protocol  - Continue PTA gabapentin 300 mg twice daily  - Will prescribe freestyle gaston 2 and GLP-1 inhibitor upon discharge    Rhabdomyolysis possibly secondary to seizure, improved  CKD stage 3  CK >1700 upon arrival to the ED. Cr of 1, Appears to be baseline  - NS decreased to 50 mL/h as CK has improved to 533 this morning  - Holding PTA Lasix   - Holding PTA statin    Hypothyroidism  Patient had low TSH of 0.15 upon admission  - Decreased PTA levothyroxine from 137 mcg to 125    Hypertension  - Continue PTA lisinopril 40 mg  - Continue PTA amlodipine 5 mg  - Hold PTA Lasix    Ascending aortic dilation, stable  Noted on CT scan, but not much increase in size  "compared to previous scan in 2018  - follow up outpatient        Diet: Moderate Consistent Carb (60 g CHO per Meal) Diet  Diet    DVT Prophylaxis: Low Risk/Ambulatory with no VTE prophylaxis indicated  Martins Catheter: Not present  Fluids: NS @ 50 mL/h  Lines: None     Cardiac Monitoring: None  Code Status:    DNI, CPR okay. Wants to have quality of life preserved    Disposition Plan      Expected Discharge Date: 01/22/2023      Destination: home          The patient's care was discussed with the Attending Physician, Dr. Bi Ayala MD.    Logan Suazo DO PGY-1  Hospitalist Service   [facility]  Securely message with [employer] (more info)  Text page via [facility] Paging/Directory   ______________________________________________________________________    Interval History   Patient was sitting up in a chair during my encounter today.  She did not have any seizures overnight, and she did not experience any hypoglycemia overnight or this morning.  He discussed a plan for discharge, but patient stated she would feel comfortable spending 1 more night in the hospital to make sure that she does not go low.  She states that if she does not have hypoglycemia tonight she will feel very confident going home tomorrow on this regimen.  She states that the pain in her legs is improving.  Her left leg feels back to baseline, and she still complains of some right lower extremity pain, especially behind her knee.  She has been able to ambulate normally throughout her room. She denies any chest pain, shortness of breath, headache, nausea, or vomiting.    I asked more about her abdominal pain today, and patient states that it has been present for some time, and she recently passed \"something\" in her urine that she believes to be a kidney stone.  Patient says the pain has improved since its initiation a few weeks ago, however there is still some lingering pain.    Physical Exam   Vital Signs: Temp: 97.8  F (36.6 "  C) Temp src: Oral BP: (!) 151/69 Pulse: 50   Resp: 16 SpO2: 96 % O2 Device: None (Room air)    Weight: 163 lbs 9.6 oz  Constitutional: awake, alert, cooperative, no apparent distress, and appears stated age  Eyes: Lids and lashes normal, pupils equal, extra ocular muscles intact, sclera clear, conjunctiva normal, corrective glasses in place  ENT: Normocephalic, without obvious abnormality, atraumatic, external ears without lesions, good dentition.  Respiratory: No increased work of breathing, good air exchange, clear to auscultation bilaterally, no crackles or wheezing  Cardiovascular: Regular rate and rhythm, systolic murmur II-III/VI located at left sternal border   GI: Small area of ecchymosis in left lower quadrant, normal bowel sounds, soft, non-distended, tender to palpation of the left upper quadrant, no masses palpated  Skin:  Ecchymosis in left lower quadrant of abdomen.  No other rashes or lesions  Musculoskeletal: There is no redness, warmth, or swelling of the joints.  Full range of motion noted, however range of motion of right lower extremity causes pain behind the right knee.  Overall, pain of bilateral legs is improved from yesterday.  Left leg back to baseline, per patient. Tone is normal.  Neurologic: Awake, alert, oriented to name, place and time. Sensory is intact.   Neuropsychiatric: General: normal, calm and normal eye contact          Data     I have personally reviewed the following data over the past 24 hrs:    N/A  \   N/A   / N/A     142 115 (H) 11 /  228 (H)   4.3 22 0.78 \       Imaging results reviewed over the past 24 hrs:   No results found for this or any previous visit (from the past 24 hour(s)).

## 2023-01-21 NOTE — PLAN OF CARE
Problem: Plan of Care - These are the overarching goals to be used throughout the patient stay.    Goal: Plan of Care Review  Description: The Plan of Care Review/Shift note should be completed every shift.  The Outcome Evaluation is a brief statement about your assessment that the patient is improving, declining, or no change.  This information will be displayed automatically on your shift note.  Outcome: Progressing       Pt is alert and oriented. Is SBA. Pt able to make their needs known. Tolerating regular diet. Pt has BLE weakness.  Pt was given PRN Tizanidine 4mg per pt request. PRN Tylenol administered and provided mild relief. BS elevated to 380, BS checked after insulin was administered and it was down to 329. IV infusing.  Discharge is pending medical clearance.

## 2023-01-22 VITALS
HEART RATE: 52 BPM | BODY MASS INDEX: 30.91 KG/M2 | DIASTOLIC BLOOD PRESSURE: 69 MMHG | RESPIRATION RATE: 18 BRPM | OXYGEN SATURATION: 93 % | SYSTOLIC BLOOD PRESSURE: 140 MMHG | WEIGHT: 163.6 LBS | TEMPERATURE: 98.6 F

## 2023-01-22 PROBLEM — E16.2 HYPOGLYCEMIA: Chronic | Status: ACTIVE | Noted: 2017-01-06

## 2023-01-22 PROBLEM — M62.82 NON-TRAUMATIC RHABDOMYOLYSIS: Status: ACTIVE | Noted: 2023-01-22

## 2023-01-22 LAB
GLUCOSE BLDC GLUCOMTR-MCNC: 161 MG/DL (ref 70–99)
GLUCOSE BLDC GLUCOMTR-MCNC: 266 MG/DL (ref 70–99)

## 2023-01-22 PROCEDURE — 99238 HOSP IP/OBS DSCHRG MGMT 30/<: CPT

## 2023-01-22 PROCEDURE — 250N000012 HC RX MED GY IP 250 OP 636 PS 637

## 2023-01-22 PROCEDURE — 250N000013 HC RX MED GY IP 250 OP 250 PS 637: Performed by: EMERGENCY MEDICINE

## 2023-01-22 PROCEDURE — 250N000013 HC RX MED GY IP 250 OP 250 PS 637

## 2023-01-22 RX ADMIN — ACETAMINOPHEN 650 MG: 325 TABLET ORAL at 04:25

## 2023-01-22 RX ADMIN — AMLODIPINE BESYLATE 5 MG: 5 TABLET ORAL at 08:19

## 2023-01-22 RX ADMIN — LEVOTHYROXINE SODIUM 125 MCG: 125 TABLET ORAL at 06:57

## 2023-01-22 RX ADMIN — CHOLECALCIFEROL TAB 25 MCG (1000 UNIT) 50 MCG: 25 TAB at 08:19

## 2023-01-22 RX ADMIN — LISINOPRIL 40 MG: 40 TABLET ORAL at 08:19

## 2023-01-22 RX ADMIN — GABAPENTIN 300 MG: 300 CAPSULE ORAL at 08:19

## 2023-01-22 RX ADMIN — INSULIN ASPART 3 UNITS: 100 INJECTION, SOLUTION INTRAVENOUS; SUBCUTANEOUS at 12:45

## 2023-01-22 ASSESSMENT — ACTIVITIES OF DAILY LIVING (ADL)
ADLS_ACUITY_SCORE: 22

## 2023-01-22 NOTE — PLAN OF CARE
Pt discharged home via private vehicle. AVS printed and education completed. All questions answered. VSS at time of discharge. Pt belongings sent with pt.     Sarah Cervantes RN on 1/22/2023 at 1:58 PM

## 2023-01-22 NOTE — DISCHARGE SUMMARY
[facility]  Discharge Summary - Medicine & Pediatrics       Date of Admission:  1/19/2023  Date of Discharge:  1/22/2023  Discharging Provider: Logan Suazo DO  Attending Provider: Bi Ayala MD  Discharge Service: Hospitalist Service    Discharge Diagnoses   Type 2 diabetes mellitus  Hypoglycemia  Rhabdomyolysis  Suspected seizure  Hypertension  Hypothyroidism    Follow-ups Needed After Discharge   Follow-up Appointments     Follow-up and recommended labs and tests       Follow up with Logan booth DO, within 2-3 weeks. for hospital   follow- up.  The following labs/tests are recommended: Evaluate arian, TSH           Follow-up and recommended labs and tests       Follow up with Logan booth DO or Bi Ayala MD, within 1   week. to evaluate treatment change and for hospital follow- up.  The   following labs/tests are recommended: FreeStyle Arian readings. Check TSH   in 6-8 weeks.             Unresulted Labs Ordered in the Past 30 Days of this Admission     Date and Time Order Name Status Description    1/20/2023 12:09 PM C-peptide In process       These results will be followed up by me    Discharge Disposition   Discharged to home  Condition at discharge: Stable    Hospital Course   Nichole Titus was admitted on 1/19/2023 for suspected seizure and hypoglycemia.  The following problems were addressed during her hospitalization:    Type 2 diabetes with peripheral neuropathy  Hypoglycemia, improved  Concern for seizure given urine incontinence, elevated CK, dental pain  Rhabdomyolysis  Patient presented to the emergency department after waking up that morning with blood in her mouth and on her pillowcase, pain in the left side of her chest and bilateral legs, and new incontinence.  The never happened to her before.  Initial work-up showed an elevated CK of >1700, normal electrolytes with GFR of 60, normal CBC, low TSH of 0.15, and initial glucose of 129.  However, a  few hours after presentation patient was feeling clammy and was found to be hypoglycemic at 49.  At this time we suspected that the patient had a seizure overnight due to hypoglycemia and was experiencing subsequent rhabdomyolysis.  We started her on IVF and we decreased her PTA dose of 56 units of Lantus down to 25, however the patient was again hypoglycemic the following morning.  Although she did not have a seizure her first night in the hospital.  We then decreased the Lantus to 15 units nightly which resulted in no hypoglycemia overnight.  Patient wished to stay 1 more day in the hospital to make sure that she did not become hypoglycemic again, and her glucose the morning of discharge was 161.  Her CK steadily declined throughout her admission and she was discharged home with a plan to titrate her insulin dose based on blood sugars that are tracked by the freestyle gaston that was prscribed. We held the patient's statin and Lasix due to the rhabdomyolysis.  These may be resumed as outpatient.    Neurology was consulted and were in agreement with our team that the suspected seizure was likely due to hypoglycemia.  They did not recommend further neuro work-up unless patient had a repeat event.    Hypothyroidism  Patient had low TSH of 0.15 upon arrival to the ED, so we decreased her home dose of levothyroxine from 137 mcg to 125 mcg.  Instructed patient to follow-up with her PCP in 6-8 weeks to recheck thyroid levels.      Consultations This Hospital Stay   NEUROLOGY IP CONSULT  CARE MANAGEMENT / SOCIAL WORK IP CONSULT  PHARMACY TEST CLAIM IP CONSULT    Code Status   Special Code       The patient was discussed with MD Logan Webb, [address] [address]  Phone: [phone]  Fax: [phone]  ______________________________________________________________________    Physical Exam   Vital Signs:  Temp: 98.6  F (37  C) Temp src: Oral BP: (!) 140/69 Pulse: 52   Resp: 18 SpO2: 93 % O2 Device: None (Room air)    Weight: 163 lbs 9.6 oz  Constitutional: awake, alert, cooperative, no apparent distress, and appears stated age  Eyes: Lids and lashes normal, pupils equal, extra ocular muscles intact, sclera clear, conjunctiva normal, corrective glasses in place  ENT: Normocephalic, without obvious abnormality, atraumatic, external ears without lesions, good dentition.  Respiratory: No increased work of breathing, good air exchange, clear to auscultation bilaterally, no crackles or wheezing  Cardiovascular: Regular rate and rhythm, systolic murmur II-III/VI located at left sternal border   GI: Small area of ecchymosis in left lower quadrant, normal bowel sounds, soft, non-distended, tender to palpation of the left upper quadrant, no masses palpated  Skin:  Ecchymosis in left lower quadrant of abdomen.  No other rashes or lesions  Musculoskeletal: There is no redness, warmth, or swelling of the joints.  Full range of motion noted, however range of motion of right lower extremity causes pain behind the right knee. Left leg back to baseline, per patient. Tone is normal.  Neurologic: Awake, alert, oriented to name, place and time. Sensory is intact.   Neuropsychiatric: General: normal, calm and normal eye contact      Primary Care Physician   Velvet Mckay    Discharge Orders      Reason for your hospital stay    Hypoglycemia, rhabdomyolysis, suspected seizure     Follow-up and recommended labs and tests     Follow up with Logan booth DO, within 2-3 weeks. for hospital follow- up.  The following labs/tests are recommended: Evaluate gaston, TSH     Activity    Your activity upon discharge: activity as tolerated     Reason for your hospital stay    Hypoglycemia, rhabdomyolysis, suspected seizure     Follow-up and recommended labs and tests     Follow up with Logan booth DO or Bi Ayala MD, within 1 week. to  evaluate treatment change and for hospital follow- up.  The following labs/tests are recommended: FreeStyle Arian readings. Check TSH in 6-8 weeks.     Activity    Your activity upon discharge: activity as tolerated     Diet    Follow this diet upon discharge: Orders Placed This Encounter      Moderate Consistent Carb (60 g CHO per Meal) Diet     Diet    Follow this diet upon discharge: Orders Placed This Encounter      Moderate Consistent Carb (60 g CHO per Meal) Diet      Diet       Significant Results and Procedures   Most Recent 3 CBC's:Recent Labs   Lab Test 01/20/23  0511 01/19/23  1130 05/23/22 2153   WBC 7.1 8.4 7.3   HGB 13.3 15.0 15.5   MCV 87 84 84    215 219     Most Recent 3 BMP's:Recent Labs   Lab Test 01/22/23  1233 01/22/23  0801 01/21/23  2156 01/21/23  1226 01/21/23  0938 01/20/23  0635 01/20/23  0511 01/19/23  1502 01/19/23  1130   NA  --   --   --   --  142  --  142  --  139   POTASSIUM  --   --   --   --  4.3  --  3.5  --  3.9   CHLORIDE  --   --   --   --  115*  --  114*  --  107   CO2  --   --   --   --  22  --  21*  --  23   BUN  --   --   --   --  11  --  13  --  14   CR  --   --   --   --  0.78  --  0.72  --  1.00   ANIONGAP  --   --   --   --  5  --  7  --  9   MICHAEL  --   --   --   --  9.0  --  8.3*  --  9.2   * 161* 314*   < > 202*   < > 49*   < > 129*    < > = values in this interval not displayed.     Most Recent TSH and T4:Recent Labs   Lab Test 01/19/23  1130   TSH 0.15*   T4 1.29     Most Recent 6 glucoses:Recent Labs   Lab Test 01/22/23  1233 01/22/23  0801 01/21/23  2156 01/21/23  1956 01/21/23  1714 01/21/23  1226   * 161* 314* 319* 218* 228*   ,   Results for orders placed or performed during the hospital encounter of 01/19/23   CTA Chest Abdomen Pelvis w Contrast    Narrative    EXAM: CTA CHEST ABDOMEN PELVIS W CONTRAST  LOCATION: [facility]  DATE/TIME: 1/19/2023 12:32 PM    INDICATION: aortic valve, chest pain, radiates to jaw,  left lower quad pain and bilat leg pain  COMPARISON: CT abdomen pelvis 07/19/2022, CT chest 05/28/2019  TECHNIQUE: CT angiogram chest abdomen pelvis during arterial phase of injection of IV contrast. 2D and 3D MIP reconstructions were performed by the CT technologist. Dose reduction techniques were used.   CONTRAST: Isovue 370 75ml    FINDINGS:   CT ANGIOGRAM CHEST, ABDOMEN, AND PELVIS: Fusiform aneurysmal dilation of the ascending aorta measuring 3.7 cm at the level of the right pulmonary artery and 4.0 cm at the aortic root (previously 3.5 cm and 3.8 cm, respectively). Variant aortic arch   anatomy with direct origin of the left vertebral artery. Normal caliber descending thoracic and abdominal aorta with no evidence of dissection or intramural hematoma. Visualized portions of the great vessels and visceral branches are widely patent.   Minimal atherosclerotic calcification of the infrarenal aorta, but no critical stenosis.    LUNGS AND PLEURA: Normal.    MEDIASTINUM/AXILLAE: No lymphadenopathy. Aortic valve replacement. Thyroid is atrophic and not well visualized. No central pulmonary emboli.    CORONARY ARTERY CALCIFICATION: Mild.    HEPATOBILIARY: Cholecystectomy. Normal liver and bile ducts.    PANCREAS: Normal.    SPLEEN: Normal.    ADRENAL GLANDS: Normal.    KIDNEYS/BLADDER: Normal.    BOWEL: Normal.    LYMPH NODES: Normal.    PELVIC ORGANS: Absent    MUSCULOSKELETAL: Median sternotomy wires. Unchanged moderate compression deformities of L3 and L4. Mild degenerative changes in the spine.      Impression    IMPRESSION:  1.  No evidence of acute aortic syndrome or other acute findings.    2.  Fusiform dilation of the ascending aorta measuring up to 4.0 cm, minimally increased compared to 05/28/2018.     US Lower Extremity Venous Duplex Bilateral    Narrative    EXAM: US LOWER EXTREMITY VENOUS DUPLEX BILATERAL  LOCATION: [facility]  DATE/TIME: 1/19/2023 12:24 PM    INDICATION: bilat  calf pain  COMPARISON: None.  TECHNIQUE: Venous Duplex ultrasound of bilateral lower extremities with and without compression, augmentation and duplex. Color flow and spectral Doppler with waveform analysis performed.    FINDINGS: Exam includes the common femoral, femoral, popliteal veins as well as segmentally visualized deep calf veins and greater saphenous vein.     RIGHT: No deep vein thrombosis. No superficial thrombophlebitis. No popliteal cyst.    LEFT: No deep vein thrombosis. No superficial thrombophlebitis. No popliteal cyst.      Impression    IMPRESSION:  1.  No deep venous thrombosis in the bilateral lower extremities.   Head CT w/o contrast    Narrative    EXAM: CT HEAD W/O CONTRAST  LOCATION: [facility]  DATE/TIME: 1/19/2023 12:31 PM    INDICATION: ? seizure. Awoke soiled in urine with blood over mouth and pillow case. Tooth pain.   COMPARISON: CT brain 4/3/2022. MRI brain 4/3/2022.  TECHNIQUE: Routine CT Head without IV contrast. Multiplanar reformats. Dose reduction techniques were used.    FINDINGS:  INTRACRANIAL CONTENTS: No finding for intracranial hemorrhage, mass, or acute infarct. Ventricles are normal in size. Normal gray-white matter differentiation. No mass effect or midline shift.    Cerebellar tonsils are normally positioned. Sella is unremarkable for technique. Corpus callosum is normally formed.    VISUALIZED ORBITS/SINUSES/MASTOIDS: Prior cataract surgeries. No paranasal sinus mucosal disease. No middle ear or mastoid effusion.    BONES/SOFT TISSUES: Calvarium is intact, without suspicious lytic or blastic foci. No scalp hematoma.      Impression    IMPRESSION:  1.  No finding for intracranial hemorrhage, mass, or acute infarct.       Discharge Medications   Current Discharge Medication List      START taking these medications    Details   Continuous Blood Gluc Sensor (FREESTYLE JAVAN 2 SENSOR) [facility] [place] 1 each every 14 days Use 1 sensor every 14 days. Use to read  blood sugars per 's instructions.  Qty: 2 each, Refills: 5    Associated Diagnoses: Diabetes mellitus, type II, insulin dependent (H)      exenatide ER (BYDUREON BCISE) 2 MG/0.85ML auto-injector Inject 2 mg Subcutaneous every 7 days  Qty: 3.4 mL, Refills: 0    Associated Diagnoses: Diabetes mellitus, type II, insulin dependent (H)         CONTINUE these medications which have CHANGED    Details   insulin glargine (LANTUS PEN) 100 UNIT/ML pen Inject 15 Units Subcutaneous every morning  Qty: 15 mL, Refills: 0    Comments: If Lantus is not covered by insurance, may substitute Basaglar or Semglee or other insulin glargine product per insurance preference at same dose and frequency.    Associated Diagnoses: Diabetes mellitus, type II, insulin dependent (H)      insulin lispro (HUMALOG KWIKPEN) 100 UNIT/ML (1 unit dial) KWIKPEN Inject 5 Units Subcutaneous 3 times daily (before meals)  Qty: 15 mL, Refills: 0    Associated Diagnoses: Diabetes mellitus, type II, insulin dependent (H)      levothyroxine (SYNTHROID/LEVOTHROID) 125 MCG tablet Take 1 tablet (125 mcg) by mouth every morning (before breakfast)  Qty: 90 tablet, Refills: 0    Associated Diagnoses: Acquired hypothyroidism         CONTINUE these medications which have NOT CHANGED    Details   acetaminophen (TYLENOL) 500 MG tablet [ACETAMINOPHEN (TYLENOL) 500 MG TABLET] Take 500 mg by mouth every 6 (six) hours as needed for pain.      albuterol (PROAIR HFA;PROVENTIL HFA;VENTOLIN HFA) 90 mcg/actuation inhaler [ALBUTEROL (PROAIR HFA;PROVENTIL HFA;VENTOLIN HFA) 90 MCG/ACTUATION INHALER] Inhale 2 puffs every 6 (six) hours as needed for wheezing.      alendronate (FOSAMAX) 70 MG tablet [ALENDRONATE (FOSAMAX) 70 MG TABLET] Take 1 tablet (70 mg total) by mouth every 7 days. Take in the morning on an empty stomach with a full glass of water 30 minutes before food  Qty: 12 tablet, Refills: 3    Associated Diagnoses: Diabetes (H)      amLODIPine (NORVASC) 5 MG tablet  Take 5 mg by mouth daily      atorvastatin (LIPITOR) 20 MG tablet [ATORVASTATIN (LIPITOR) 20 MG TABLET] Take 20 mg by mouth daily.      furosemide (LASIX) 40 MG tablet [FUROSEMIDE (LASIX) 40 MG TABLET] Take 20 mg by mouth daily.       gabapentin (NEURONTIN) 100 MG capsule Take 300 mg by mouth 2 times daily      lisinopril (ZESTRIL) 40 MG tablet Take 40 mg by mouth daily      tiZANidine (ZANAFLEX) 2 MG capsule Take 2-4 mg by mouth 2 times daily as needed      vitamin D3 (CHOLECALCIFEROL) 50 mcg (2000 units) tablet Take 1 tablet by mouth daily      acetone, urine, test Strp [ACETONE, URINE, TEST STRP] Check urine for ketones if your BS is above 250 and not coming down with 1 bolus.  Call Dr Vora with moderate or large ketones  Qty: 50 strip, Refills: 3    Associated Diagnoses: Diabetes mellitus type 2, uncontrolled      blood sugar diagnostic (CONTOUR NEXT STRIPS) Strp [BLOOD SUGAR DIAGNOSTIC (CONTOUR NEXT STRIPS) STRP] Check blood sugars up to 6X/day  Qty: 180 strip, Refills: 6    Associated Diagnoses: Diabetes mellitus type 2, uncontrolled         STOP taking these medications       Continuous Blood Gluc  (FREESTYLE JAVAN 2 READER) GUILLAUME Comments:   Reason for Stopping:             Allergies   Allergies   Allergen Reactions     Amoxicillin Hives     Codeine Itching     Metformin Diarrhea     Prednisone Itching     Simvastatin      Other reaction(s): *Unknown     Sulfa Drugs Itching and Hives     Adhesive Tape Rash     EKG stickers and Tegaderm

## 2023-01-22 NOTE — PROGRESS NOTES
Care Management Discharge Note    Discharge Date: 01/22/2023       Discharge Disposition: Home    Discharge Services: None    Discharge DME: None    Discharge Transportation: family or friend will provide    Private pay costs discussed: Not applicable    PAS Confirmation Code:  (N/A)  Patient/family educated on Medicare website which has current facility and service quality ratings: no    Education Provided on the Discharge Plan:  N/A    Persons Notified of Discharge Plans: Pt   Patient/Family in Agreement with the Plan: yes    Handoff Referral Completed: No    Additional Information:  Chart reviewed.  Pt discharging home.  No CM needs identified.  Family to transport.     LEEROY Kelly

## 2023-01-22 NOTE — PLAN OF CARE
Problem: Plan of Care - These are the overarching goals to be used throughout the patient stay.    Goal: Optimal Comfort and Wellbeing  Outcome: Progressing  Intervention: Monitor Pain and Promote Comfort   Goal Outcome Evaluation:  Patient is alert and oriented X 4. Pain controlled with PRN Tylenol. Ambulated to the bathroom independently and voiding. VSS. IV saline locked. Discharge plan pending medical clearance.

## 2023-01-22 NOTE — PLAN OF CARE
Problem: Plan of Care - These are the overarching goals to be used throughout the patient stay.    Goal: Optimal Comfort and Wellbeing  Outcome: Progressing   Goal Outcome Evaluation:       Pt. Will rest tonight with minimal rest and sleep interruptions.

## 2023-01-22 NOTE — PLAN OF CARE
[id number]   Pt had a good shift. Complains of pain in jaw and leg. Which improved with prn tylnol at end of last shift. Ambulating with ax1. Tolerating oral intake. 2U of insulin given with dinner due to bg level. vss.

## 2023-01-23 LAB — C PEPTIDE SERPL-MCNC: 3.2 NG/ML (ref 0.9–6.9)

## 2023-01-25 ENCOUNTER — OFFICE VISIT (OUTPATIENT)
Dept: FAMILY MEDICINE | Facility: CLINIC | Age: 73
End: 2023-01-25
Payer: COMMERCIAL

## 2023-01-25 VITALS
OXYGEN SATURATION: 97 % | HEIGHT: 60 IN | DIASTOLIC BLOOD PRESSURE: 83 MMHG | SYSTOLIC BLOOD PRESSURE: 122 MMHG | TEMPERATURE: 97.7 F | BODY MASS INDEX: 32.67 KG/M2 | HEART RATE: 81 BPM | WEIGHT: 166.4 LBS | RESPIRATION RATE: 20 BRPM

## 2023-01-25 DIAGNOSIS — N18.30 STAGE 3 CHRONIC KIDNEY DISEASE, UNSPECIFIED WHETHER STAGE 3A OR 3B CKD (H): ICD-10-CM

## 2023-01-25 DIAGNOSIS — I10 HYPERTENSION, BENIGN ESSENTIAL, GOAL BELOW 140/90: Chronic | ICD-10-CM

## 2023-01-25 DIAGNOSIS — T79.6XXD TRAUMATIC RHABDOMYOLYSIS, SUBSEQUENT ENCOUNTER: ICD-10-CM

## 2023-01-25 DIAGNOSIS — E03.9 ACQUIRED HYPOTHYROIDISM: ICD-10-CM

## 2023-01-25 DIAGNOSIS — Z79.4 DIABETES MELLITUS, TYPE II, INSULIN DEPENDENT (H): Primary | ICD-10-CM

## 2023-01-25 DIAGNOSIS — E11.9 DIABETES MELLITUS, TYPE II, INSULIN DEPENDENT (H): Primary | ICD-10-CM

## 2023-01-25 LAB
ANION GAP SERPL CALCULATED.3IONS-SCNC: 14 MMOL/L (ref 7–15)
BUN SERPL-MCNC: 23.7 MG/DL (ref 8–23)
CALCIUM SERPL-MCNC: 10.7 MG/DL (ref 8.8–10.2)
CHLORIDE SERPL-SCNC: 101 MMOL/L (ref 98–107)
CK SERPL-CCNC: 274 U/L (ref 26–192)
CREAT SERPL-MCNC: 1.23 MG/DL (ref 0.51–0.95)
CREAT UR-MCNC: 52.5 MG/DL
DEPRECATED HCO3 PLAS-SCNC: 22 MMOL/L (ref 22–29)
GFR SERPL CREATININE-BSD FRML MDRD: 47 ML/MIN/1.73M2
GLUCOSE SERPL-MCNC: 373 MG/DL (ref 70–99)
MICROALBUMIN UR-MCNC: <12 MG/L
MICROALBUMIN/CREAT UR: NORMAL MG/G{CREAT}
POTASSIUM SERPL-SCNC: 4.7 MMOL/L (ref 3.4–5.3)
SODIUM SERPL-SCNC: 137 MMOL/L (ref 136–145)

## 2023-01-25 PROCEDURE — 82043 UR ALBUMIN QUANTITATIVE: CPT | Performed by: FAMILY MEDICINE

## 2023-01-25 PROCEDURE — 82570 ASSAY OF URINE CREATININE: CPT | Performed by: FAMILY MEDICINE

## 2023-01-25 PROCEDURE — 82550 ASSAY OF CK (CPK): CPT | Performed by: FAMILY MEDICINE

## 2023-01-25 PROCEDURE — 36415 COLL VENOUS BLD VENIPUNCTURE: CPT | Performed by: FAMILY MEDICINE

## 2023-01-25 PROCEDURE — 80048 BASIC METABOLIC PNL TOTAL CA: CPT | Performed by: FAMILY MEDICINE

## 2023-01-25 PROCEDURE — 99214 OFFICE O/P EST MOD 30 MIN: CPT | Performed by: FAMILY MEDICINE

## 2023-01-25 ASSESSMENT — ACTIVITIES OF DAILY LIVING (ADL)
DIFFICULTY_EATING/SWALLOWING: NO
TOILETING_ISSUES: NO
WEAR_GLASSES_OR_BLIND: YES
DRESSING/BATHING_DIFFICULTY: NO
NUMBER_OF_TIMES_PATIENT_HAS_FALLEN_WITHIN_LAST_SIX_MONTHS: 1
WALKING_OR_CLIMBING_STAIRS_DIFFICULTY: NO
HEARING_DIFFICULTY_OR_DEAF: NO
CONCENTRATING,_REMEMBERING_OR_MAKING_DECISIONS_DIFFICULTY: NO
DIFFICULTY_COMMUNICATING: NO
CHANGE_IN_FUNCTIONAL_STATUS_SINCE_ONSET_OF_CURRENT_ILLNESS/INJURY: NO
DOING_ERRANDS_INDEPENDENTLY_DIFFICULTY: NO
FALL_HISTORY_WITHIN_LAST_SIX_MONTHS: YES

## 2023-01-25 NOTE — PROGRESS NOTES
"S: Nichole Titus is a 72 year old female who returns for follow up of  1/22/23 Discharge Diagnoses     Type 2 diabetes mellitus/unconrolled  Hypoglycemia/severe Was using 56 unit of basal  insuline  Plus correction  Meal insuline  Rhabdomyolysis  Suspected seizure/due to hypoglycemia: glucose 46. positive for prolactin    Hypertension  Hypothyroidism  Patients states that main concern today is  Hospital follow up   New to our clinic   Needs  To transfer [facility]  PMHX/PSHX/MEDS/ALLERGIES/SHX/FHX reviewed and updated in Epic.      ROS:  General: No fevers, chills  Head: No headache  Ears: No acute change in hearing.    CV: No chest pain or palpitations.  Resp: No shortness of breath.  No cough. No hemoptysis.  GI: No nausea, vomiting, constipation, diarrhea  : No urinary pains    O: /83   Pulse 81   Temp 97.7  F (36.5  C) (Oral)   Resp 20   Ht 1.532 m (5' 0.3\")   Wt 75.5 kg (166 lb 6.4 oz)   SpO2 97%   BMI 32.18 kg/m     Gen:  Well nourished and in NAD    CV:  RRR  - no murmurs, rubs, or gallups,   Pulm:  CTAB, no wheezes/rales/rhonchi, good air entry   ABD: soft, nontender, no masses, no rebound, BS intact throughout  Extrem: no cyanosis, edema or clubbing  Psych: Euthymic    Feet: protective sensation    (N18.30) Chronic kidney disease, stage III (moderate) (H                   Plan: Urine microalb check,  BMP, CK    (E11.9,  Z79.4) Diabetes mellitus, type 2, insulin dependent (H)  Comment:Goal for now is to avoid hypocalcemia  Plan:Ad GLP 1   CGM  Insuline to 18 units/was on 15    After pharmacy ed    follow-up in clinc 2 weeks: review CGM data and adjsut insuline      H NT:  in target lisinopril 40 mg and  amlodipine  5  mg      Bi Ayala MD      "

## 2023-01-25 NOTE — PATIENT INSTRUCTIONS
Follow-up with pharmacist for GLP1 and Arian teaching      Urine protein tet done    Insuline Glargine/Lantus increase from 15 to 18 units

## 2023-01-26 RX ORDER — ACETAMINOPHEN 500 MG
500 TABLET ORAL EVERY 6 HOURS PRN
Status: CANCELLED | OUTPATIENT
Start: 2023-01-26

## 2023-01-27 ENCOUNTER — OFFICE VISIT (OUTPATIENT)
Dept: PHARMACY | Facility: CLINIC | Age: 73
End: 2023-01-27
Payer: COMMERCIAL

## 2023-01-27 VITALS
DIASTOLIC BLOOD PRESSURE: 76 MMHG | SYSTOLIC BLOOD PRESSURE: 129 MMHG | TEMPERATURE: 97.7 F | OXYGEN SATURATION: 98 % | RESPIRATION RATE: 16 BRPM | HEART RATE: 76 BPM

## 2023-01-27 DIAGNOSIS — S22.000A THORACIC COMPRESSION FRACTURE, CLOSED, INITIAL ENCOUNTER (H): ICD-10-CM

## 2023-01-27 DIAGNOSIS — G62.9 PERIPHERAL POLYNEUROPATHY: ICD-10-CM

## 2023-01-27 DIAGNOSIS — R06.02 SHORTNESS OF BREATH: ICD-10-CM

## 2023-01-27 DIAGNOSIS — Z79.4 DIABETES MELLITUS, TYPE II, INSULIN DEPENDENT (H): ICD-10-CM

## 2023-01-27 DIAGNOSIS — E03.9 ACQUIRED HYPOTHYROIDISM: ICD-10-CM

## 2023-01-27 DIAGNOSIS — I10 ESSENTIAL HYPERTENSION: Chronic | ICD-10-CM

## 2023-01-27 DIAGNOSIS — M85.80 OSTEOPENIA, UNSPECIFIED LOCATION: ICD-10-CM

## 2023-01-27 DIAGNOSIS — M62.82 NON-TRAUMATIC RHABDOMYOLYSIS: Primary | ICD-10-CM

## 2023-01-27 DIAGNOSIS — E11.9 DIABETES MELLITUS, TYPE II, INSULIN DEPENDENT (H): ICD-10-CM

## 2023-01-27 PROCEDURE — 99207 PR NO CHARGE LOS: CPT | Performed by: PHARMACIST

## 2023-01-27 RX ORDER — FUROSEMIDE 20 MG
20 TABLET ORAL DAILY
COMMUNITY
Start: 2023-01-27 | End: 2023-05-01

## 2023-01-27 NOTE — PROGRESS NOTES
Medication Therapy Management (MTM) Encounter    ASSESSMENT:                            Medication Adherence/Access: No issues identified    Rhabdomyolysis: Improved.  CK is trending down; serum creatinine bumped a little bit but I suspect that is due to the creatinine leaving her body.    Type 2 diabetes: Most recent HbA1c at an outside facility was not at goal of less than 8%.  Additionally patient's self-reported blood sugars do not seem well controlled.  I am hesitant today to make changes based on her recent hospitalization for hypoglycemia.  I did educate patient today on how to use the freestyle arian and Bydureon.  I would like to see a weeks worth of values next week and adjust her insulin accordingly.    Hypothyroidism: Too early to assess; patient should get a repeat TSH in about 6 to 8 weeks.    Hypertension: Stable.  Continue current medications.    Neuropathic pain: Stable.  Continue current medications.    Osteopenia with related fracture: Stable.  Continue current medications.     General shortness of breath:  Stable.  Continue current medications.     PLAN:                            Continue current medications  Start Bydureon once weekly  Use freestyle arian to test blood sugars    Follow-up: 1 week with myself and February 8 with Dr. Ayala.     SUBJECTIVE/OBJECTIVE:                          Nichole Titus is a 72 year old female seen for a transitions of care visit. She was discharged from [facility] on 1/22/23 for Hypoglycemia, rhabdomyolysis, suspected seizure. Patient was accompanied by granddaughter who will help with the Freestyle Arian..     Reason for visit: hospital follow up, diabetes.    Allergies/ADRs: Reviewed in chart  Past Medical History: Reviewed in chart  Tobacco: She reports that she has never smoked. She has never used smokeless tobacco.  Social History     Tobacco Use     Smoking status: Never     Smokeless tobacco: Never   Substance Use Topics     Alcohol use: Yes      Alcohol/week: 1.7 standard drinks     Comment: Alcoholic Drinks/day: once a week     Drug use: No       Medication Adherence/Access: no issues reported    Rhabdomyolysis: Patient was hospitalized at [facility] 1/19 - 1/22 for rhabdomyolysis, thought to be secondary to hypoglycemia and potentially a subsequent seizure. Furosemide and statin were held secondary to rhabdo.  Patient reports that her pain continues to resolve.  She still has pain in her right quadriceps and in her jaw just below her bottom teeth.  Hospital note indicates that atorvastatin and furosemide were held secondary to the rhabdomyolysis.  Patient reports that she has restarted these.    Neurology consult indicated that patient's seizure was likely secondary to seizure and no further work up was needed.          Creatinine   Date Value Ref Range Status   01/25/2023 1.23 (H) 0.51 - 0.95 mg/dL Final     Type 2 Diabetes:  Currently taking lantus 18 units at bedtime (decreased from PTA dose of 56 units in hospital secondary to hypoglycemia), and insulin lispro 5 units three times daily.  Patient was prescribed Bydureon 2mg daily but has not started it; she is here today to learn how to use it.  Blood sugar monitoring: Patient is currently using fingersticks and reports that her blood sugars are quite high, usually in the 300s.  She has had no lows.  She is also here today to learn how to use the freestyle gaston  Symptoms of low blood sugar? none  Symptoms of high blood sugar?  Just does not feel right  Aspirin: No  Statin: Yes: Atorvastatin 20 mg daily  ACEi/ARB: Yes: Lisinopril 40 mg daily.   Urine Albumin:   Lab Results   Component Value Date    UMALCR  01/25/2023      Comment:      Unable to calculate, urine albumin and/or urine creatinine is outside detectable limits.  Microalbuminuria is defined as an albumin:creatinine ratio of 17 to 299 for males and 25 to 299 for females. A ratio of albumin:creatinine of 300 or higher is indicative of overt  proteinuria.  Due to biologic variability, positive results should be confirmed by a second, first-morning random or 24-hour timed urine specimen. If there is discrepancy, a third specimen is recommended. When 2 out of 3 results are in the microalbuminuria range, this is evidence for incipient nephropathy and warrants increased efforts at glucose control, blood pressure control, and institution of therapy with an angiotensin-converting-enzyme (ACE) inhibitor (if the patient can tolerate it).        From [facility]:     Lab Results   Component Value Date    A1C 13.2 05/01/2017    A1C 8.1 01/06/2017    A1C 8.5 05/23/2013    A1C 8.9 10/04/2012    A1C 8.0 09/13/2011         Hypothyroidism: Patient's TSH was low in the hospital, so levolthyroxine dose was decreased from 137mcg to 125mcg.  Patient reports being able to  this medication and starting it.  TSH   Date Value Ref Range Status   01/19/2023 0.15 (L) 0.30 - 5.00 uIU/mL Final       Hypertension: Patient currently takes amlodipine 5 mg daily, lisinopril 40 mg daily, and furosemide 20 mg daily for blood pressure.  Patient reports no issues with this.  She does not normally take her blood pressure at home.      Neuropathic pain:. Current medications are gabapentin 300 mg twice daily, acetaminophen 500mg as needed, and tizanidine 2 mg up to twice daily if needed  Patient reports these work well for her.    Osteopenia with related fracture.  Patient currently takes alendronate 70 mg once weekly and cholecalciferol 2000 units daily.  Patient reports no issues with these medications.  She states that in the past 2 months she is really done a good job of trying to take the alendronate as prescribed.  Patient is a relatively new patient to this clinic, the last DEXA scan I see is from 2017 which showed osteopenia. Patient has a hx of compression fracture.           General shortness of breath:  Occassionally gets short of breath and uses albuterol prn.       Today's  "Vitals:   BP Readings from Last 1 Encounters:   23 129/76     Pulse Readings from Last 1 Encounters:   23 76     Wt Readings from Last 1 Encounters:   23 166 lb 6.4 oz (75.5 kg)     Ht Readings from Last 1 Encounters:   23 5' 0.3\" (1.532 m)     Estimated body mass index is 32.18 kg/m  as calculated from the following:    Height as of 23: 5' 0.3\" (1.532 m).    Weight as of 23: 166 lb 6.4 oz (75.5 kg).    Temp Readings from Last 1 Encounters:   23 97.7  F (36.5  C) (Oral)       ----------------  Post Discharge Medication Reconciliation Status: discharge medications reconciled, continue medications without change.    I spent 40 minutes with this patient today. All changes were made via collaborative practice agreement with Dr Ayala. A copy of the visit note was provided to the patient's provider(s).    A summary of these recommendations was given to the patient.    April Almazan, PharmD      Medication Therapy Recommendations  Diabetes mellitus, type II, insulin dependent (H)    Current Medication: Continuous Blood Gluc  (FREESTYLE JAVAN 2 READER) GUILLAUME ()   Rationale: Does not understand instructions - Adherence - Adherence   Recommendation: Provide Education   Status: Patient Agreed - Adherence/Education          Current Medication: exenatide ER (BYDUREON BCISE) 2 MG/0.85ML auto-injector   Rationale: Does not understand instructions - Adherence - Adherence   Recommendation: Provide Education   Status: Patient Agreed - Adherence/Education                    "

## 2023-02-03 ENCOUNTER — OFFICE VISIT (OUTPATIENT)
Dept: PHARMACY | Facility: CLINIC | Age: 73
End: 2023-02-03
Payer: COMMERCIAL

## 2023-02-03 VITALS
RESPIRATION RATE: 12 BRPM | OXYGEN SATURATION: 97 % | SYSTOLIC BLOOD PRESSURE: 125 MMHG | TEMPERATURE: 97.9 F | HEART RATE: 74 BPM | DIASTOLIC BLOOD PRESSURE: 81 MMHG

## 2023-02-03 DIAGNOSIS — Z79.4 DIABETES MELLITUS, TYPE II, INSULIN DEPENDENT (H): Chronic | ICD-10-CM

## 2023-02-03 DIAGNOSIS — M62.82 NON-TRAUMATIC RHABDOMYOLYSIS: Primary | ICD-10-CM

## 2023-02-03 DIAGNOSIS — E11.9 DIABETES MELLITUS, TYPE II, INSULIN DEPENDENT (H): Chronic | ICD-10-CM

## 2023-02-03 PROCEDURE — 99207 PR NO CHARGE LOS: CPT | Performed by: PHARMACIST

## 2023-02-03 RX ORDER — INSULIN LISPRO 100 [IU]/ML
8 INJECTION, SOLUTION INTRAVENOUS; SUBCUTANEOUS
Qty: 15 ML | Refills: 4 | Status: SHIPPED | OUTPATIENT
Start: 2023-02-03 | End: 2023-03-27

## 2023-02-03 NOTE — PROGRESS NOTES
"Medication Therapy Management (MTM) Encounter    ASSESSMENT:                            Medication Adherence/Access: No issues identified    Type 2 Diabetes: Not meeting TIR goal of >70% and too early for an HbA1c.  Patient could benefit from increased dose of Lantus and Novolog.      Pain: improving.  Supportive care provided.       PLAN:                            Increase the Lantus to 26 units at bedtime  Increase the Humalog to 8 units with each meal      Follow-up: 2/8/23 with Dr Ayala and 2/20/23 with Dr. Rodrigez    SUBJECTIVE/OBJECTIVE:                          Nichole Titus is a 72 year old female seen for a follow-up visit.  Today's visit is a follow-up MTM visit from /27/23     Reason for visit: Diabetes follow up.    Allergies/ADRs: Reviewed in chart  Past Medical History: Reviewed in chart  Tobacco: She reports that she has never smoked. She has never used smokeless tobacco.  Social History     Tobacco Use     Smoking status: Never     Smokeless tobacco: Never   Substance Use Topics     Alcohol use: Yes     Alcohol/week: 1.7 standard drinks     Comment: Alcoholic Drinks/day: once a week     Drug use: No       Medication Adherence/Access: no issues reported    Type 2 Diabetes:  Taking Lantus 20 units at bedtime and Bydureon.  Patient's blood sugars are elevated, and she does not like it.  She has recently been taking 10 units Humalog in the evening when her sugars are high.  She states she has done this 4-5 times in the past week.  Patient denies side effects.  Patient's Bydureon \"misfired\" this morning when she tried to use it.  When she removed the orange cap, the entire inside popped out.   Blood sugar monitoring: Continuous Glucose Monitor.         Symptoms of low blood sugar? none  Symptoms of high blood sugar? vision changes  Aspirin: No  Statin: Yes: Atorvastatin 20 mg daily  ACEi/ARB: Yes: Lisinopril 40 mg daily.     From [facility]:         Pain secondary to rhabdomyolysis and seizure:  Pain in the " right leg has mostly resolved.  Teeth still hurt.        Today's Vitals: /81 (BP Location: Right arm, Patient Position: Sitting, Cuff Size: Adult Regular)   Pulse 74   Temp 97.9  F (36.6  C) (Oral)   Resp 12   SpO2 97%   ----------------  Post Discharge Medication Reconciliation Status: discharge medications reconciled and changed, per note/orders.    I spent 20 minutes with this patient today. All changes were made via collaborative practice agreement with Dr Ayala. A copy of the visit note was provided to the patient's provider(s).    A summary of these recommendations was given to the patient.    April Almazan, PharmD      Medication Therapy Recommendations  Diabetes mellitus, type II, insulin dependent (H)    Current Medication: insulin glargine (LANTUS PEN) 100 UNIT/ML pen   Rationale: Dose too low - Dosage too low - Effectiveness   Recommendation: Increase Dose   Status: Accepted per CPA          Current Medication: insulin lispro (HUMALOG KWIKPEN) 100 UNIT/ML (1 unit dial) KWIKPEN   Rationale: Dose too low - Dosage too low - Effectiveness   Recommendation: Increase Dose   Status: Accepted per CPA

## 2023-02-06 ENCOUNTER — DOCUMENTATION ONLY (OUTPATIENT)
Dept: PHARMACY | Facility: CLINIC | Age: 73
End: 2023-02-06
Payer: COMMERCIAL

## 2023-02-06 NOTE — PROGRESS NOTES
Clinic received paperwork stating insulin glargine is not covered by the patient's insurance and that all long-acting insulin are tier 3.     After speaking with patient's pharmacy, it appears she was able to  Lantus for $10 and her short-acting was also covered by insurance when they ran it for Humalog.     Informed PCP that there are no known issues with patient getting her insulin at this time.    Ingris Rodrigez, Pharm.D., MPH  MTM Pharmacist Resident   [facility] / [facility]

## 2023-02-08 ENCOUNTER — OFFICE VISIT (OUTPATIENT)
Dept: FAMILY MEDICINE | Facility: CLINIC | Age: 73
End: 2023-02-08
Payer: COMMERCIAL

## 2023-02-08 VITALS
DIASTOLIC BLOOD PRESSURE: 88 MMHG | BODY MASS INDEX: 31.98 KG/M2 | OXYGEN SATURATION: 99 % | RESPIRATION RATE: 16 BRPM | HEART RATE: 65 BPM | TEMPERATURE: 97.7 F | WEIGHT: 165.4 LBS | SYSTOLIC BLOOD PRESSURE: 108 MMHG

## 2023-02-08 DIAGNOSIS — E11.9 DIABETES MELLITUS, TYPE II, INSULIN DEPENDENT (H): ICD-10-CM

## 2023-02-08 DIAGNOSIS — I10 HYPERTENSION, BENIGN ESSENTIAL, GOAL BELOW 140/90: Primary | ICD-10-CM

## 2023-02-08 DIAGNOSIS — Z79.4 DIABETES MELLITUS, TYPE II, INSULIN DEPENDENT (H): ICD-10-CM

## 2023-02-08 PROCEDURE — 99214 OFFICE O/P EST MOD 30 MIN: CPT | Performed by: FAMILY MEDICINE

## 2023-02-08 NOTE — PATIENT INSTRUCTIONS
Increase insuline to 28 to 30 units   Start with 28 units for one week      Follow-up in 3 weeks /bring gaston and meds

## 2023-02-10 NOTE — PROGRESS NOTES
"  Assessment & Plan     1. Diabetes mellitus, type 2 , insulin dependent (H)  Inusline at 26 units per days. Tolerating GLP1 well. Using Gaston adequately  Appetite is modulated  Plan; Insuline to 28 units, and  In one to 30 units/her basal needs will 35 units  Follow-up 2 to 3 weeks, bring ,meds and gaston      2 Preventive: address care Gaps    Screen for colon cancer     Need for hepatitis C screening test,etc          3 BMI:   Estimated body mass index is 31.98 kg/m  as calculated from the following:    Height as of 1/25/23: 1.532 m (5' 0.3\").    Weight as of this encounter: 75 kg (165 lb 6.4 oz).       On GLP1,      Medical nutriention  for now/active person        Bi Ayala MD  [facility]    Augustin Varela is a 72 year old accompanied by  her daughter  presenting for the following health issues:  Diabetes, Patient was seen initially in the hospital where she represented with SZs/hypoglycemia  currently her insulin is being adjusted/on 26 units of Lantus   started on GLP1/tolerating  really wll   Free style gaston for diabetes self management   Reports no hypoglycemia, morning sugars around 180 and sugar above 300 during day time    Feels maria teresa hungry and more active than before    Review of Systems   Constitutional, HEENT, cardiovascular, pulmonary, gi and gu systems are negative, except as otherwise noted.      Objective    /88   Pulse 65   Temp 97.7  F (36.5  C) (Oral)   Resp 16   Wt 75 kg (165 lb 6.4 oz)   SpO2 99%   BMI 31.98 kg/m    Body mass index is 31.98 kg/m .  Physical Exam   GENERAL: healthy, alert and no distress  NECK: no adenopathy, no asymmetry, masses, or scars and thyroid normal to palpation  RESP: lungs clear to auscultation - no rales, rhonchi or wheezes  CV: regular rate and rhythm, normal S1 S2, no S3 or S4, no murmur, click or rub, no peripheral edema and peripheral pulses strong  ABDOMEN: soft, nontender, no hepatosplenomegaly, no masses and bowel " sounds normal  MS: no gross musculoskeletal defects noted, no edema

## 2023-02-20 ENCOUNTER — OFFICE VISIT (OUTPATIENT)
Dept: PHARMACY | Facility: CLINIC | Age: 73
End: 2023-02-20
Payer: COMMERCIAL

## 2023-02-20 VITALS
SYSTOLIC BLOOD PRESSURE: 134 MMHG | RESPIRATION RATE: 18 BRPM | BODY MASS INDEX: 31.83 KG/M2 | WEIGHT: 164.6 LBS | HEART RATE: 65 BPM | DIASTOLIC BLOOD PRESSURE: 85 MMHG | OXYGEN SATURATION: 99 % | TEMPERATURE: 97.3 F

## 2023-02-20 DIAGNOSIS — Z79.4 DIABETES MELLITUS, TYPE II, INSULIN DEPENDENT (H): Primary | ICD-10-CM

## 2023-02-20 DIAGNOSIS — E11.9 DIABETES MELLITUS, TYPE II, INSULIN DEPENDENT (H): Primary | ICD-10-CM

## 2023-02-20 PROCEDURE — 99207 PR NO CHARGE LOS: CPT

## 2023-02-20 NOTE — Clinical Note
Neftali! I saw Nichole today and her sugars the past few days are looking better but she is not sure what she has been doing differently for her trends to change. I did increase her Lantus slightly as the patient really did want to try a higher dose of Lantus but was hesitant to make too many changes today until we know if the past few days are her new baseline.  I am wondering if she would benefit from restarting and SGLT2i and trying to overall decrease her TDD of insulin. She is seeing you in about 3 weeks.  Thanks!  Ingris Rodrigez

## 2023-02-20 NOTE — PROGRESS NOTES
Medication Therapy Management (MTM) Encounter    ASSESSMENT:                            Medication Adherence/Access: No issues identified    Type 2 Diabetes: Not meeting time in range goal of >70% per CGM and  last A1c was not meeting goal of <7%.Patient would benefit from overall goal of decreasing the amount of insulin and restarting SGLT2i as able. Of note, patient is no longer on metformin as this caused diarrhea in the past. Today, given CGM readings and patient preference, will increase Lantus slightly and will continue on current dose of Humalog as it unclear why sugar trends have changed the past few days and unsure if the trends the past few days is her new normal. Significant education provided to patient on differences between basal and bolus insulin, proper administration of bolus inus radha PRIOR to meals, onset of action for both basal and bolus insulin, etc. Patient appropriately understands how to correct for hypoglycemia <80.     PLAN:                            1. Increase the Lantus to 30 units at bedtime  2. Continue Humalog to 8 units with each meal      Medications/Disease States to be addressed at future visit:   - SGLT2i?   - Change to Trulicity for better weight loss   - Labs: A1c    Follow-up: 3/14 with Dr Ayala and 3/27 with Dr. Rodrigez    SUBJECTIVE/OBJECTIVE:                          Nichole Titus is a 72 year old female seen for a follow-up visit.  Today's visit is a follow-up MTM visit from 2/3/23.     Reason for visit: Diabetes follow up.    Allergies/ADRs: Reviewed in chart  Past Medical History: Reviewed in chart  Tobacco: She reports that she has never smoked. She has never used smokeless tobacco.    Medication Adherence/Access: no issues reported    Type 2 Diabetes:  Taking Lantus 28 units at bedtime, Humalog 8 units with meals, and Bydureon once weekly on Fridays.    Patient's blood sugars are elevated, and she does not like it. She also notes she has not yet noticed a difference on  the scale in terms of weight but has been feeling like her clothes are fitting looser than before. No changes in appetite. Has not made any medication, diet or exercise changes over the past few days to account for the change in blood sugar trends since 2/17. She would like to know if she should give herself Humalog at bedtime if her sugars are elevated even if she is not going to be eating. She is also inquiring about what to do if her meal-time sugars are in the 90's before eating as this will happen occasionally and she is unsure if she should still give herself the insulin.   - PMH: Jan 2023 patient was hospitalized and was hypoglycemic and had a subsequent seizure  Blood sugar monitoring: Continuous Glucose Monitor.       Symptoms of low blood sugar?dizzy, shaky, sweaty; had an episode of 80 this past Saturday; was able to eat a candy bar and have a few sips of soda; felt better. Notes she has symptoms of hypoglycemia when her sugars fall below 80.   Symptoms of high blood sugar? vision changes  Aspirin: No  Statin: Yes: Atorvastatin 20 mg daily  ACEi/ARB: Yes: Lisinopril 40 mg daily.   Diet/Exercise: limiting to 60 carbs per meal since PCP recommendation 2/8      From [facility]:       Creatinine   Date Value Ref Range Status   01/25/2023 1.23 (H) 0.51 - 0.95 mg/dL Final      Latest Reference Range & Units 01/25/23 15:46   GFR Estimate >60 mL/min/1.73m2 47 (L)   (L): Data is abnormally low    Wt Readings from Last 2 Encounters:   02/20/23 164 lb 9.6 oz (74.7 kg)   02/08/23 165 lb 6.4 oz (75 kg)       Today's Vitals: /85   Pulse 65   Temp 97.3  F (36.3  C) (Tympanic)   Resp 18   Wt 164 lb 9.6 oz (74.7 kg)   SpO2 99%   BMI 31.83 kg/m    ----------------    I spent 30 minutes with this patient today. All changes were made via collaborative practice agreement with Dr. Ayala. A copy of the visit note was provided to the patient's provider(s).    A summary of these recommendations was given to the  patient.    Ingris Rodrigez, Pharm.D., MPH  MTM Pharmacist Resident   [facility] / [facility]       Medication Therapy Recommendations  Diabetes mellitus, type II, insulin dependent (H)    Current Medication: insulin glargine (LANTUS PEN) 100 UNIT/ML pen   Rationale: Dose too low - Dosage too low - Effectiveness   Recommendation: Increase Dose   Status: Accepted per CPA

## 2023-02-20 NOTE — PATIENT INSTRUCTIONS
PLAN:                            1. Increase the Lantus to 30 units at bedtime  2. Continue Humalog to 8 units with each meal

## 2023-02-22 NOTE — PROGRESS NOTES
I have verified the content of the note, which accurately reflects my assessment of the patient and the plan of care.   Elis Wade, [facility], PharmD

## 2023-03-20 ENCOUNTER — OFFICE VISIT (OUTPATIENT)
Dept: FAMILY MEDICINE | Facility: CLINIC | Age: 73
End: 2023-03-20
Payer: COMMERCIAL

## 2023-03-20 VITALS
SYSTOLIC BLOOD PRESSURE: 126 MMHG | OXYGEN SATURATION: 96 % | BODY MASS INDEX: 30.84 KG/M2 | HEART RATE: 76 BPM | WEIGHT: 159.5 LBS | TEMPERATURE: 97.1 F | DIASTOLIC BLOOD PRESSURE: 83 MMHG

## 2023-03-20 DIAGNOSIS — J06.9 URI, ACUTE: Primary | ICD-10-CM

## 2023-03-20 DIAGNOSIS — E11.9 DIABETES MELLITUS, TYPE II, INSULIN DEPENDENT (H): ICD-10-CM

## 2023-03-20 DIAGNOSIS — Z79.4 DIABETES MELLITUS, TYPE II, INSULIN DEPENDENT (H): ICD-10-CM

## 2023-03-20 LAB — SARS-COV-2 RNA RESP QL NAA+PROBE: NEGATIVE

## 2023-03-20 PROCEDURE — U0005 INFEC AGEN DETEC AMPLI PROBE: HCPCS | Performed by: FAMILY MEDICINE

## 2023-03-20 PROCEDURE — U0003 INFECTIOUS AGENT DETECTION BY NUCLEIC ACID (DNA OR RNA); SEVERE ACUTE RESPIRATORY SYNDROME CORONAVIRUS 2 (SARS-COV-2) (CORONAVIRUS DISEASE [COVID-19]), AMPLIFIED PROBE TECHNIQUE, MAKING USE OF HIGH THROUGHPUT TECHNOLOGIES AS DESCRIBED BY CMS-2020-01-R: HCPCS | Performed by: FAMILY MEDICINE

## 2023-03-20 PROCEDURE — 99214 OFFICE O/P EST MOD 30 MIN: CPT | Mod: CS | Performed by: FAMILY MEDICINE

## 2023-03-20 RX ORDER — HYDROXYZINE HYDROCHLORIDE 10 MG/1
TABLET, FILM COATED ORAL
COMMUNITY
Start: 2022-04-20 | End: 2023-07-24

## 2023-03-20 RX ORDER — BENZONATATE 100 MG/1
100-200 CAPSULE ORAL 3 TIMES DAILY PRN
Qty: 40 CAPSULE | Refills: 0 | Status: SHIPPED | OUTPATIENT
Start: 2023-03-20 | End: 2023-07-24

## 2023-03-20 RX ORDER — MECLIZINE HYDROCHLORIDE 25 MG/1
TABLET ORAL
COMMUNITY
Start: 2022-04-09 | End: 2023-07-24

## 2023-03-20 NOTE — PROGRESS NOTES
ICD-10-CM    1. URI, acute  J06.9 Symptomatic COVID-19 Virus (Coronavirus) by PCR Nose     benzonatate (TESSALON) 100 MG capsule      2. Diabetes mellitus, type II, insulin dependent (H)  E11.9     Z79.4        likely this is viral bronchitis. covid pending. She is highrisk if posivie but outside treatment window for antivirals. Diabetes control reportedly impoved but no recent a1c. The blood sugars had been in the 100s but lately been in the 300 with this illness. Close follow up discussed if not impoving. The minimal wheezing seemed not to warrant prednisone given her blood sugars.   -------------------------------  Nichole Titus with presents with 7-8 days symptoms including productive cough, runny nose. Some wheezing but not shortness of breath, without fever. Some cneter of chest pain with coughing. The patient has a history of bronchitis for which she was gien albuterol but this not very helpful this time.     Treatment measures tried include OTC Cough med and Inhaler (name: albuterol).  Exposures--no  Recent travel--no     Current Outpatient Medications   Medication Sig Dispense Refill     acetaminophen (TYLENOL) 500 MG tablet [ACETAMINOPHEN (TYLENOL) 500 MG TABLET] Take 500 mg by mouth every 6 (six) hours as needed for pain.       albuterol (PROAIR HFA;PROVENTIL HFA;VENTOLIN HFA) 90 mcg/actuation inhaler [ALBUTEROL (PROAIR HFA;PROVENTIL HFA;VENTOLIN HFA) 90 MCG/ACTUATION INHALER] Inhale 2 puffs every 6 (six) hours as needed for wheezing.       alendronate (FOSAMAX) 70 MG tablet [ALENDRONATE (FOSAMAX) 70 MG TABLET] Take 1 tablet (70 mg total) by mouth every 7 days. Take in the morning on an empty stomach with a full glass of water 30 minutes before food 12 tablet 3     amLODIPine (NORVASC) 5 MG tablet Take 5 mg by mouth daily       atorvastatin (LIPITOR) 20 MG tablet [ATORVASTATIN (LIPITOR) 20 MG TABLET] Take 20 mg by mouth daily.       exenatide ER (BYDUREON BCISE) 2 MG/0.85ML auto-injector Inject 2 mg  Subcutaneous every 7 days 10.2 mL 4     furosemide (LASIX) 20 MG tablet Take 1 tablet (20 mg) by mouth daily       gabapentin (NEURONTIN) 100 MG capsule Take 300 mg by mouth 2 times daily       insulin glargine (LANTUS PEN) 100 UNIT/ML pen Inject 26 Units Subcutaneous At Bedtime 30 mL 4     insulin lispro (HUMALOG KWIKPEN) 100 UNIT/ML (1 unit dial) KWIKPEN Inject 8 Units Subcutaneous 3 times daily (before meals) 15 mL 4     levothyroxine (SYNTHROID/LEVOTHROID) 125 MCG tablet Take 1 tablet (125 mcg) by mouth every morning (before breakfast) 90 tablet 0     lisinopril (ZESTRIL) 40 MG tablet Take 40 mg by mouth daily       tiZANidine (ZANAFLEX) 2 MG capsule Take 2-4 mg by mouth 2 times daily as needed       vitamin D3 (CHOLECALCIFEROL) 50 mcg (2000 units) tablet Take 1 tablet by mouth daily       blood sugar diagnostic (CONTOUR NEXT STRIPS) Strp [BLOOD SUGAR DIAGNOSTIC (CONTOUR NEXT STRIPS) STRP] Check blood sugars up to 6X/day 180 strip 6     Continuous Blood Gluc Sensor (FREESTYLE JAVAN 2 SENSOR) [facility] 1 each every 14 days Use 1 sensor every 14 days. Use to read blood sugars per 's instructions. 6 each 4     hydrOXYzine (ATARAX) 10 MG tablet        meclizine (ANTIVERT) 25 MG tablet          ROS otherwise negative for resp., ID,  HEENT symptoms.    Objective: /83 (BP Location: Left arm, Patient Position: Sitting, Cuff Size: Adult Large)   Pulse 76   Temp 97.1  F (36.2  C) (Tympanic)   Wt 72.3 kg (159 lb 8 oz)   SpO2 96%   BMI 30.84 kg/m    Exam:  GENERAL APPEARANCE: healthy, alert and no distress  EYES: Eyes grossly normal to inspection  HENT: ear canals and TM's normal and nose and mouth without ulcers or lesions  NECK: no adenopathy, no asymmetry, masses, or scars and thyroid normal to palpation  RESP: lungs clear to auscultation except for minimal wheeze bilaterally in the back lower.   CV: regular rates and rhythm, no murmur

## 2023-03-20 NOTE — LETTER
March 20, 2023      Nichole FANNY Titus  [address]   [place] [address]        To Whom It May Concern,     Nichole ESCOBEDO Tacho was seen in clinic today. Please excuse her absence. She should be able to return on or about March 23, 2023.       Sincerely,        Fred Xiao MD

## 2023-03-26 NOTE — PROGRESS NOTES
Medication Therapy Management (MTM) Encounter    ASSESSMENT:                            Medication Adherence/Access: No issues identified. Of note, assisted patient with setting up [employer] account.      Type 2 Diabetes: Not meeting time in range goal of >70% per CGM and last A1c was not meeting goal of <7%. Patient due for A1c recheck, will future order lab. Patient would benefit from overall goal of decreasing the amount of insulin and restarting SGLT2i. Today, given CGM readings and patient preference, will increase Lantus slightly and will decrease Humalog as she has been having postprandial lows. Will also initiate patient on SGLT2i as she may benefit from further sugar control and hopefully be able to lower her insulin dosing. Prescriptions updated and sent to the pharmacy. Patient appropriately understands how to correct for hypoglycemia <70. In the future, she may benefit from transitioning off of Bydureon and onto Trulicity which may offer better weight loss. Will defer this change until the patient has used up her current supply of Bydureon.     PLAN:                            1. Increase Lantus to 32 units   2. Novolog 8-10 units before meals. If you are eating a big meal, you can use 10 units and if you are eating a smaller meal, you can try 8 units   3. START Jardiance 10mg once daily     Medications/Disease States to be addressed at future visit:   - Change to Trulicity for better weight loss   - Labs: A1c and BMP 4 weeks after starting Jardiance     Follow-up: 4/13 with MTM     SUBJECTIVE/OBJECTIVE:                          Nichole Titus is a 72 year old female seen for a follow-up visit.  Today's visit is a follow-up MTM visit from 2/20/23.     Reason for visit: Diabetes follow up.    Allergies/ADRs: Reviewed in chart  Past Medical History: Reviewed in chart  Tobacco: She reports that she has never smoked. She has never used smokeless tobacco.    Medication Adherence/Access: no issues reported    Type  2 Diabetes: Taking Lantus 30 units at bedtime, Humalog 12 units with meals, and Bydureon once weekly on Fridays.    Patient's blood sugars are elevated, and she does not like it. She does note that she was acutely sick with a cough about a week or so ago but is feeling better today. While she was sick, her sugars were higher than she would like and she was not feeling good. She therefore self-adjusted her meal-time insulin to 12 units before meals. Patient notes that since increasing the Humalog, she will sometimes goes low after eating in the 60s as her meal sizes are inconsistent and if she eats smaller meals that is when she usually will go low.   - PMH: Jan 2023 patient was hospitalized and was hypoglycemic and had a subsequent seizure; metformin (diarrhea)   Blood sugar monitoring: Continuous Glucose Monitor.         Symptoms of low blood sugar? has been down in the 60s specifically after eating smaller meals. Denies feeling symptoms when low as she is able to correct quickly when she is alerted of a low   Symptoms of high blood sugar? vision changes, dizzy, shaky, sweaty  Aspirin: No, not indicated given age   Statin: Yes: Atorvastatin 20 mg daily  ACEi/ARB: Yes: Lisinopril 40 mg daily.   Diet/Exercise: limiting to 60 carbs per meal since PCP recommendation 2/8; eating at least 3 meals per day and will sometimes snack on fruit or crackers during the day      From [facility]:       Creatinine   Date Value Ref Range Status   01/25/2023 1.23 (H) 0.51 - 0.95 mg/dL Final      Latest Reference Range & Units 01/25/23 15:46   GFR Estimate >60 mL/min/1.73m2 47 (L)   (L): Data is abnormally low    Wt Readings from Last 2 Encounters:   03/20/23 159 lb 8 oz (72.3 kg)   02/20/23 164 lb 9.6 oz (74.7 kg)       Today's Vitals: /82 (BP Location: Right arm, Patient Position: Sitting, Cuff Size: Adult Regular)   Pulse 70   Temp 97.8  F (36.6  C) (Oral)   Resp 16   SpO2 97%   ----------------    I spent 30 minutes with this  patient today. All changes were made via collaborative practice agreement with Dr. Gan. A copy of the visit note was provided to the patient's provider(s).    A summary of these recommendations was given to the patient.    Ingris Rodrigez, Pharm.D., MPH  MTM Pharmacist Resident   [facility] / [facility]       Medication Therapy Recommendations  No medication therapy recommendations to display

## 2023-03-27 ENCOUNTER — OFFICE VISIT (OUTPATIENT)
Dept: PHARMACY | Facility: CLINIC | Age: 73
End: 2023-03-27
Payer: COMMERCIAL

## 2023-03-27 VITALS
OXYGEN SATURATION: 97 % | SYSTOLIC BLOOD PRESSURE: 127 MMHG | TEMPERATURE: 97.8 F | DIASTOLIC BLOOD PRESSURE: 82 MMHG | RESPIRATION RATE: 16 BRPM | HEART RATE: 70 BPM

## 2023-03-27 DIAGNOSIS — Z79.4 DIABETES MELLITUS, TYPE II, INSULIN DEPENDENT (H): Primary | ICD-10-CM

## 2023-03-27 DIAGNOSIS — E11.9 DIABETES MELLITUS, TYPE II, INSULIN DEPENDENT (H): Primary | ICD-10-CM

## 2023-03-27 PROCEDURE — 99207 PR NO CHARGE LOS: CPT

## 2023-03-27 RX ORDER — INSULIN LISPRO 100 [IU]/ML
8-10 INJECTION, SOLUTION INTRAVENOUS; SUBCUTANEOUS
Qty: 30 ML | Refills: 3 | Status: SHIPPED | OUTPATIENT
Start: 2023-03-27 | End: 2023-04-03

## 2023-03-27 NOTE — Clinical Note
Making some adjustments to her diabetes regimen specifically to try and prevent postprandial lows.   Thanks!  Ingris

## 2023-03-27 NOTE — PATIENT INSTRUCTIONS
"Recommendations from today's MTM visit:                                                    MTM (medication therapy management) is a service provided by a clinical pharmacist designed to help you get the most of out of your medicines.      1. Increase Lantus to 32 units   2. Novolog 8-10 units before meals. If you are eating a big meal, you can use 10 units and if you are eating a smaller meal, you can try 8 units   3. START Jardiance 10mg once daily in the morning     Follow-up: 4/13    It was great speaking with you today.  I value your experience and would be very thankful for your time in providing feedback in our clinic survey. In the next few days, you may receive an email or text message from [employer] with a link to a survey related to your  clinical pharmacist.\"     To schedule another MTM appointment, please call the clinic directly or you may call the MTM scheduling line at [phone] or toll-free at [phone].     My Clinical Pharmacist's contact information:                                                      Please feel free to contact me with any questions or concerns you have.     Ingris Rodrigez, Pharm.D., MPH  MTM Pharmacist Resident   [facility] / [facility]    "

## 2023-03-28 NOTE — PROGRESS NOTES
I have verified the content of the note, which accurately reflects my assessment of the patient and the plan of care.   Elis Wade, [facility], PharmD

## 2023-03-31 ENCOUNTER — TELEPHONE (OUTPATIENT)
Dept: FAMILY MEDICINE | Facility: CLINIC | Age: 73
End: 2023-03-31
Payer: COMMERCIAL

## 2023-03-31 NOTE — TELEPHONE ENCOUNTER
Called patient back.  Decreased Lantus to 30 units and instructed patient to hold Humalog over the weekend, and follow up Monday with Dr. Rodrigez.  Patient to keep on the Jardiance. Domingo TravisD

## 2023-03-31 NOTE — TELEPHONE ENCOUNTER
Routing to Dr. Rodrigez and Dr. Almazan as FYI.     Pt called saying her BS down to 50 w/ the new med changes made on her visit on 3/27/23. She said it takes 2 hours to get it back up. She took 10 units of novolog at lunch and ate chicken and rice. BS at 3pm 80. She ate some chocolate and BS back up to 139. She tried 8 units yesterday and BS went down as well.     She is worried about her BS dropping low and doesn't want to end up w/ seizure again. Pt notified Dr. Almazan will call her to discuss what do to for the weekend but pt to see Dr. Rodrigez next Monday.    Day Chester RN on 3/31/2023 at 4:41 PM

## 2023-03-31 NOTE — TELEPHONE ENCOUNTER
[facility] phone call message- general phone call:    Reason for call: Patient have questions about her isolant and her blood pressure keeps dropping low. Patient is worried. Would like a call back asap.    Return call needed: Yes    OK to leave a message on voice mail? Yes    Primary language: English      needed? No    Call taken on March 31, 2023 at 3:10 PM by Jaci Arreola

## 2023-04-02 NOTE — PROGRESS NOTES
Medication Therapy Management (MTM) Encounter    ASSESSMENT:                            Medication Adherence/Access: No issues identified.     Type 2 Diabetes: Not meeting A1c goal of <7%. Patient due for A1c recheck, lab future ordered previously and patient agrees to get labs drawn prior to next visit. Last week, patient contacted the clinic for hypoglycemic lows and her basal insulin was subsequently stopped. Today, given patient is no longer having hypoglycemic episodes, will have patient continue with current off of bolus insulin and continue with basal insulin. Patient appropriately understands how to correct for hypoglycemia <70. In the future, she may benefit from transitioning off of Bydureon and onto Trulicity which may offer better weight loss. She would also benefit from increasing Jardiance as able. Will defer this change until the patient has used up her current supply of Bydureon per patient preference.     PLAN:                            1. Continue taking Lantus to 32 units once daily  2. Continue OFF Novolog before meals.    Medications/Disease States to be addressed at future visit:   - Change to Trulicity for better weight loss if patient open to this   - Labs: A1c (before next visit) and BMP 4 weeks after starting Jardiance (started 3/27)  - increase Jardiance to 25mg/d    Follow-up: 4/13 with MTM     SUBJECTIVE/OBJECTIVE:                          Nichole Titus is a 72 year old female called for a follow-up visit.  Today's visit is a follow-up MTM visit from 3/31/23 when patient contacted the clinic regarding a medication question.     Reason for visit: Diabetes follow up.    Allergies/ADRs: Reviewed in chart  Past Medical History: Reviewed in chart/  Tobacco: She reports that she has never smoked. She has never used smokeless tobacco.    Medication Adherence/Access: no issues reported    Type 2 Diabetes: Taking Lantus 32 units at bedtime, no longer using Humalog 8-10 units with meals, Jardiance  10mg every day in the mornings, and Bydureon once weekly on Fridays.    Woke up this morning with fasting sugars of 176. Since stopping the meal-time insulin on Friday, she has been running about 130s-180s. She notes that she has been feeling a little off lately even though she is no longer going low.   - PMH: Jan 2023 patient was hospitalized and was hypoglycemic and had a subsequent seizure; metformin(diarrhea)   Blood sugar monitoring: Continuous Glucose Monitor.   Aspirin: No, not indicated given age   Statin: Yes: Atorvastatin 20 mg daily  ACEi/ARB: Yes: Lisinopril 40 mg daily.   Diet/Exercise: limiting to 60 carbs per meal since PCP recommendation 2/8; eating at least 3 meals per day and will sometimes snack on fruit or crackers during the day      From [facility]:       Creatinine   Date Value Ref Range Status   01/25/2023 1.23 (H) 0.51 - 0.95 mg/dL Final      Latest Reference Range & Units 01/25/23 15:46   GFR Estimate >60 mL/min/1.73m2 47 (L)   (L): Data is abnormally low    Wt Readings from Last 2 Encounters:   03/20/23 159 lb 8 oz (72.3 kg)   02/20/23 164 lb 9.6 oz (74.7 kg)       Today's Vitals: There were no vitals taken for this visit.  ----------------    I spent 20 minutes with this patient today. All changes were made via collaborative practice agreement with Dr. Cleveland. A copy of the visit note was provided to the patient's provider(s).    A summary of these recommendations was sent via [employer].    Ingris Rodrigez, Pharm.D., MPH  MTM Pharmacist Resident   [facility]    Telemedicine Visit Details  Type of service:  Telephone visit  Start Time: 8:00 AM  End Time: 8:17 AM     Medication Therapy Recommendations  No medication therapy recommendations to display

## 2023-04-03 ENCOUNTER — VIRTUAL VISIT (OUTPATIENT)
Dept: PHARMACY | Facility: CLINIC | Age: 73
End: 2023-04-03
Payer: COMMERCIAL

## 2023-04-03 DIAGNOSIS — E11.9 DIABETES MELLITUS, TYPE II, INSULIN DEPENDENT (H): Primary | ICD-10-CM

## 2023-04-03 DIAGNOSIS — Z79.4 DIABETES MELLITUS, TYPE II, INSULIN DEPENDENT (H): Primary | ICD-10-CM

## 2023-04-03 PROCEDURE — 99207 PR NO CHARGE LOS: CPT

## 2023-04-03 NOTE — Clinical Note
Nichole is no longer having lows since stopping Novolog! I will be seeing her in about 10 days for follow up to continue to adjust medications, specifically for diabetes  -Ingris

## 2023-04-03 NOTE — PATIENT INSTRUCTIONS
"Recommendations from today's MTM visit:                                                       1. Continue taking Lantus to 32 units once daily  2. Continue OFF Novolog before meals.    Follow-up: 4/13     It was great speaking with you today.  I value your experience and would be very thankful for your time in providing feedback in our clinic survey. In the next few days, you may receive an email or text message from [employer] with a link to a survey related to your  clinical pharmacist.\"     To schedule another MTM appointment, please call the clinic directly or you may call the MTM scheduling line at [phone] or toll-free at [phone].     My Clinical Pharmacist's contact information:                                                      Please feel free to contact me with any questions or concerns you have.      Ingris Rodrigez, Pharm.D., MPH  MTM Pharmacist Resident   [facility] / [facility]    "

## 2023-04-12 NOTE — PROGRESS NOTES
Medication Therapy Management (MTM) Encounter    ASSESSMENT:                            Medication Adherence/Access: No issues identified    Type 2 diabetes: Not meeting HbA1c goal of less than 7%, however: Much improved since last check.  Patient is interested in increasing dose of Jardiance at this time.  Additionally patient would be interested in switching from Bydureon to Trulicity to help with weight loss.  She currently has 3 pens left of the Bydureon at this time though.      PLAN:                            Increase Jardiance to 25 mg daily    Follow-up: 2 weeks with me  Medication issues to be addressed at a future visit: Switch Bydureon to Trulicity.    SUBJECTIVE/OBJECTIVE:                          Nichole Titus is a 73 year old female seen for a follow-up visit.  Today's visit is a follow-up MTM visit from 4/6/23     Reason for visit: diabetes follow up.    Allergies/ADRs: Reviewed in chart  Past Medical History: Reviewed in chart  Tobacco: She reports that she has never smoked. She has never used smokeless tobacco.  Social History     Tobacco Use     Smoking status: Never     Smokeless tobacco: Never   Substance Use Topics     Alcohol use: Yes     Alcohol/week: 1.7 standard drinks of alcohol     Comment: Alcoholic Drinks/day: once a week     Drug use: No       Medication Adherence/Access: no issues reported    Type 2 Diabetes:  Currently taking Lantus 32 units once daily If evening BG is >250 she will take 35 units (this was a recent change she started on her own), Jardiance 10 mg daily, and Bydureon once weekly.  Patient was taken off her Humalog due to a good blood sugar response to the Jardiance. Patient is not experiencing side effects.  Blood sugar monitoring: Continuous Glucose Monitor.       Symptoms of low blood sugar? None -no low blood sugars since last visit  Symptoms of high blood sugar? none  Aspirin: Not indicated  Statin: Yes: Atorvastatin 20 mg daily  ACEi/ARB: Yes: Lisinopril 40 mg  daily.   Urine Albumin:   Lab Results   Component Value Date    UMALCR  01/25/2023      Comment:      Unable to calculate, urine albumin and/or urine creatinine is outside detectable limits.  Microalbuminuria is defined as an albumin:creatinine ratio of 17 to 299 for males and 25 to 299 for females. A ratio of albumin:creatinine of 300 or higher is indicative of overt proteinuria.  Due to biologic variability, positive results should be confirmed by a second, first-morning random or 24-hour timed urine specimen. If there is discrepancy, a third specimen is recommended. When 2 out of 3 results are in the microalbuminuria range, this is evidence for incipient nephropathy and warrants increased efforts at glucose control, blood pressure control, and institution of therapy with an angiotensin-converting-enzyme (ACE) inhibitor (if the patient can tolerate it).        Lab Results   Component Value Date    A1C 13.2 05/01/2017    A1C 8.1 01/06/2017    A1C 8.5 05/23/2013    A1C 8.9 10/04/2012    A1C 8.0 09/13/2011       Today's Vitals: /73   Pulse 77   SpO2 96%   ----------------      I spent 20 minutes with this patient today. All changes were made via collaborative practice agreement with Dr Cleveland. A copy of the visit note was provided to the patient's provider(s).    A summary of these recommendations was given to the patient.    April Almazan, Kelly      Medication Therapy Recommendations  No medication therapy recommendations to display

## 2023-04-13 ENCOUNTER — LAB (OUTPATIENT)
Dept: LAB | Facility: CLINIC | Age: 73
End: 2023-04-13
Payer: COMMERCIAL

## 2023-04-13 ENCOUNTER — OFFICE VISIT (OUTPATIENT)
Dept: PHARMACY | Facility: CLINIC | Age: 73
End: 2023-04-13
Payer: COMMERCIAL

## 2023-04-13 VITALS — HEART RATE: 77 BPM | DIASTOLIC BLOOD PRESSURE: 73 MMHG | SYSTOLIC BLOOD PRESSURE: 103 MMHG | OXYGEN SATURATION: 96 %

## 2023-04-13 DIAGNOSIS — E11.9 DIABETES MELLITUS, TYPE II, INSULIN DEPENDENT (H): ICD-10-CM

## 2023-04-13 DIAGNOSIS — Z79.4 DIABETES MELLITUS, TYPE II, INSULIN DEPENDENT (H): ICD-10-CM

## 2023-04-13 LAB — HBA1C MFR BLD: 7.8 % (ref 0–5.6)

## 2023-04-13 PROCEDURE — 83036 HEMOGLOBIN GLYCOSYLATED A1C: CPT

## 2023-04-13 PROCEDURE — 99207 PR NO CHARGE LOS: CPT | Performed by: PHARMACIST

## 2023-04-13 PROCEDURE — 36415 COLL VENOUS BLD VENIPUNCTURE: CPT

## 2023-04-13 NOTE — PATIENT INSTRUCTIONS
It was great to meet with you today.  You may receive a survey via email or text message in the next few days about your MTM pharmacist or physician.  We value your experience and would be very thankful for your time with providing feedback on our clinic survey.      Increase Jardiance to 25mg daily  Ok to keep taking Lantus 32 units daily but 35 units if evening blood sugar is >250

## 2023-04-27 NOTE — PROGRESS NOTES
Medication Therapy Management (MTM) Encounter    ASSESSMENT:                            Medication Adherence/Access: No issues identified    Type 2 Diabetes: Patient is meeting HbA1c goal of <8% but not <7%.  Patient is motivated to continue to make changes.  Discussed the risks and benefits of Trulicity versus Mounjaro and patient elected to go with Mounjaro as it could result in more blood glucose control.  Additionally for the time being we will keep patient on Jardiance 10 mg as we are starting a new medication.    PLAN:                              Switch from Bydureon to Mounjaro.  We will have an appointment next Friday to learn how to use it.    Stay on Jardiance 10mg daily    Keep doing your same dose of Lantus    Call [phone] to get replace the broke Freestyle Sensor    Follow-up: 1 week for Mounjaro education.  And then approximately 3 weeks after that.    SUBJECTIVE/OBJECTIVE:                          Nichole Titus is a 73 year old female called for a follow-up visit.  Today's visit is a follow-up MTM visit from 4/13/23     Reason for visit: Diabetes.    Allergies/ADRs: Reviewed in chart  Past Medical History: Reviewed in chart  Tobacco: She reports that she has never smoked. She has never used smokeless tobacco.  Social History     Tobacco Use     Smoking status: Never     Smokeless tobacco: Never   Substance Use Topics     Alcohol use: Yes     Alcohol/week: 1.7 standard drinks of alcohol     Comment: Alcoholic Drinks/day: once a week     Drug use: No       Medication Adherence/Access: no issues reported    Type 2 Diabetes:  Currently taking Lantus 34 units once daily and if evening BG is >250 she will take 36 units,m  Jardiance 10mg daily, and Bydureon once weekly.  Patient was taken off her Humalog due to a good blood sugar response to the Jardiance.  While we attempted to increase Jardiance 10mg to 25 mg at last visit, patient was not able to make this change because her insurance would not let  her fill it (refill too soon with the 10mg).  Patient reports no issues with this regimen.  Today, patient is interested in switching to Trulicity or Mounjaro for better blood sugar control and weight loss.  Benefits inquiry with the diabetes liaison indications this cost for either of these products is $10.35 for a 28 day supply.     Blood sugar monitoring: Continuous Glucose Monitor    Kind of up here and there. 150 - 200. Sometimes up into the 300s if ate poorly    Did have a sensor that didn't work that she had to waste.     Symptoms of low blood sugar? None since last visit    Aspirin: Not indicated  Statin: Yes: Atorvastatin 20 mg daily  ACEi/ARB: Yes: Lisinopril 40 mg daily.      Urine Albumin:   Lab Results   Component Value Date    UMALCR  01/25/2023      Comment:      Unable to calculate, urine albumin and/or urine creatinine is outside detectable limits.  Microalbuminuria is defined as an albumin:creatinine ratio of 17 to 299 for males and 25 to 299 for females. A ratio of albumin:creatinine of 300 or higher is indicative of overt proteinuria.  Due to biologic variability, positive results should be confirmed by a second, first-morning random or 24-hour timed urine specimen. If there is discrepancy, a third specimen is recommended. When 2 out of 3 results are in the microalbuminuria range, this is evidence for incipient nephropathy and warrants increased efforts at glucose control, blood pressure control, and institution of therapy with an angiotensin-converting-enzyme (ACE) inhibitor (if the patient can tolerate it).        Lab Results   Component Value Date    A1C 7.8 04/13/2023    A1C 13.2 05/01/2017    A1C 8.1 01/06/2017    A1C 8.5 05/23/2013    A1C 8.9 10/04/2012       Today's Vitals: There were no vitals taken for this visit.  ----------------      I spent 13minutes with this patient today. All changes were made via collaborative practice agreement with Dr. Cleveland. A copy of the visit note was  provided to the patient's provider(s).    A summary of these recommendations was sent via [employer].    April Almazan, DomingoD     Telemedicine Visit Details  Type of service:  Telephone visit  Start Time: 8:07am  End Time: 8:20am     Medication Therapy Recommendations  Diabetes mellitus, type II, insulin dependent (H)    Current Medication: exenatide ER (BYDUREON BCISE) 2 MG/0.85ML auto-injector (Discontinued)   Rationale: More effective medication available - Ineffective medication - Effectiveness   Recommendation: Change Medication - Mounjaro 2.5 MG/0.5ML Sopn   Status: Accepted per CPA

## 2023-04-28 ENCOUNTER — VIRTUAL VISIT (OUTPATIENT)
Dept: PHARMACY | Facility: CLINIC | Age: 73
End: 2023-04-28
Payer: COMMERCIAL

## 2023-04-28 DIAGNOSIS — E11.9 DIABETES MELLITUS, TYPE II, INSULIN DEPENDENT (H): Primary | ICD-10-CM

## 2023-04-28 DIAGNOSIS — Z79.4 DIABETES MELLITUS, TYPE II, INSULIN DEPENDENT (H): Primary | ICD-10-CM

## 2023-04-28 PROCEDURE — 99207 PR NO CHARGE LOS: CPT | Performed by: PHARMACIST

## 2023-04-28 RX ORDER — TIRZEPATIDE 2.5 MG/.5ML
2.5 INJECTION, SOLUTION SUBCUTANEOUS
Qty: 2 ML | Refills: 0 | Status: SHIPPED | OUTPATIENT
Start: 2023-04-28 | End: 2023-05-26 | Stop reason: DRUGHIGH

## 2023-04-28 NOTE — PATIENT INSTRUCTIONS
It was great to meet with you today.  You may receive a survey via email or text message in the next few days about your MTM pharmacist or physician.  We value your experience and would be very thankful for your time with providing feedback on our clinic survey.      Switch from Bydureon to Mounjaro.  We will have an appointment next Friday to learn how to use it.  Stay on Jardiance 10mg daily  Keep doing your same dose of Lantus  Call [phone] to get replace the broke Freestyle Sensor    April Almazan, PharmD

## 2023-05-01 DIAGNOSIS — E03.9 ACQUIRED HYPOTHYROIDISM: ICD-10-CM

## 2023-05-01 RX ORDER — LEVOTHYROXINE SODIUM 125 UG/1
125 TABLET ORAL
Qty: 90 TABLET | Refills: 0 | Status: SHIPPED | OUTPATIENT
Start: 2023-05-01 | End: 2023-07-27

## 2023-05-01 RX ORDER — FUROSEMIDE 20 MG
20 TABLET ORAL DAILY
Qty: 30 TABLET | Refills: 1 | Status: SHIPPED | OUTPATIENT
Start: 2023-05-01 | End: 2023-07-31

## 2023-05-05 ENCOUNTER — OFFICE VISIT (OUTPATIENT)
Dept: PHARMACY | Facility: CLINIC | Age: 73
End: 2023-05-05
Payer: COMMERCIAL

## 2023-05-05 DIAGNOSIS — E11.9 DIABETES MELLITUS, TYPE II, INSULIN DEPENDENT (H): Primary | ICD-10-CM

## 2023-05-05 DIAGNOSIS — Z79.4 DIABETES MELLITUS, TYPE II, INSULIN DEPENDENT (H): Primary | ICD-10-CM

## 2023-05-05 PROCEDURE — 99207 PR NO CHARGE LOS: CPT | Performed by: PHARMACIST

## 2023-05-05 NOTE — PROGRESS NOTES
Clinical Pharmacy Consult:                                                    Nichole Titus is a 73 year old female seen for a clinical pharmacist consult. .     Reason for Consult: Mounjaro education    Discussion: Went over Mounjaro with patient. Patient was able to adequately demonstrate technique.      Plan:  1. Continue current medications; MTM appointment in 3 weeks to increase medication.     April Almazan, DomingoD

## 2023-05-13 ENCOUNTER — HOSPITAL ENCOUNTER (OUTPATIENT)
Dept: GENERAL RADIOLOGY | Facility: HOSPITAL | Age: 73
Discharge: HOME | End: 2023-05-13
Attending: FAMILY MEDICINE | Admitting: FAMILY MEDICINE
Payer: COMMERCIAL

## 2023-05-13 ENCOUNTER — OFFICE VISIT (OUTPATIENT)
Dept: FAMILY MEDICINE | Facility: CLINIC | Age: 73
End: 2023-05-13
Payer: COMMERCIAL

## 2023-05-13 VITALS
TEMPERATURE: 98.1 F | RESPIRATION RATE: 16 BRPM | OXYGEN SATURATION: 93 % | SYSTOLIC BLOOD PRESSURE: 108 MMHG | DIASTOLIC BLOOD PRESSURE: 72 MMHG | HEART RATE: 68 BPM

## 2023-05-13 DIAGNOSIS — M25.561 ACUTE PAIN OF RIGHT KNEE: Primary | ICD-10-CM

## 2023-05-13 PROCEDURE — 73562 X-RAY EXAM OF KNEE 3: CPT | Mod: RT

## 2023-05-13 PROCEDURE — 99213 OFFICE O/P EST LOW 20 MIN: CPT | Performed by: FAMILY MEDICINE

## 2023-05-13 NOTE — PROGRESS NOTES
Nichole Titus is a 73 year old female who comes in today for knee pain after fall      Fell at a restaurant about 5 pm yesterday      Patient got her foot caught in her daughter's purse and so fell to ground    Patient thinks maybe she twisted her knee    Happened fast     Pain right away    Worse today       Had surgery on this  Knee  2016, not replacement      No other injuries from yesterday    Full physical not done     Mentation and affect are fine    No tremor of speech or extremity    Patient sitting in wheelchair    No redness/ bruising/ discoloration of right knee area    Only very subtle swelling    Patient not able to fully extend or flex right knee    She is tender anteriorly, posteriorly, medially, and laterally on knee    Xray ordered    No fx/ dislocation seen    Patient has good sensation in leg incl distally    Some tenderness over right heel but most pain is at knee area    Good dorsalis pedis       ASSESSMENT / PLAN:  (M25.561) Acute pain of right knee  (primary encounter diagnosis)  Comment: likely soft tissue injury. Only minimal swelling on exam.  Patient needs to see sports med/ orthopedics.  They will call Monday am to see if they can get appointment that day.  Call or be seen sooner if needed.  Over the counter meds prn. She has walker at home. Can use ace wrap or over the counter brace/ immobilizer.  Rest, ice, elevated.   Plan: XR Knee Right 3 Views, Orthopedic          Referral               I reviewed the patient's medications, allergies, medical history, family history, and social history.    Inocente Abrams MD

## 2023-05-13 NOTE — PATIENT INSTRUCTIONS
Rest, ice, elevate    Use ace wrap or brace as needed    Use walker at home    Call Monday am to get appointment with sports med specialist for Monday if possible    Call / be seen sooner if needed

## 2023-05-15 ENCOUNTER — TRANSFERRED RECORDS (OUTPATIENT)
Dept: HEALTH INFORMATION MANAGEMENT | Facility: CLINIC | Age: 73
End: 2023-05-15
Payer: COMMERCIAL

## 2023-05-15 NOTE — RESULT ENCOUNTER NOTE
I am covering for Dr. Ayala.  I hope that your knee is getting better and that you were able to see the orthopedic doctor.    Dr. Cleveland

## 2023-05-20 ENCOUNTER — HEALTH MAINTENANCE LETTER (OUTPATIENT)
Age: 73
End: 2023-05-20

## 2023-05-25 NOTE — PROGRESS NOTES
Medication Therapy Management (MTM) Encounter    ASSESSMENT:                            Medication Adherence/Access: No issues identified    Type 2 Diabetes: Patient is meeting HbA1c goal of <8% but not <7%.  Patient is tolerating  Mounjaro well and therefore we can increase dose today.  Given low blood glucoses, and increasing Mounjaro, we should back off on Lantus.  Will follow up closely because of past incidence with severe hypoglycemia.      PLAN:                            Increase Mounjaro to 5mg daily  Decrease Lantus to 30units daily    Follow-up: 2 weeks by phone.   Note - patient is interested in re-establishing care with Dr. Suazo when he returns from paternity leave. He saw patient in hospital.     Medication issues to be addressed at a future visit:    Further increase Mounjaro    Increase Jardiance     Decrease/stop Lantus     SUBJECTIVE/OBJECTIVE:                          Nichole Titus is a 73 year old female called for a follow-up visit.  Today's visit is a follow-up MTM visit from 5/5/23     Reason for visit: Diabetes follow up    Allergies/ADRs: Reviewed in chart  Past Medical History: Reviewed in chart  Tobacco: She reports that she has never smoked. She has never used smokeless tobacco.  Social History     Tobacco Use     Smoking status: Never     Smokeless tobacco: Never   Substance Use Topics     Alcohol use: Yes     Alcohol/week: 1.7 standard drinks of alcohol     Comment: Alcoholic Drinks/day: once a week     Drug use: No       Medication Adherence/Access: no issues reported    Type 2 Diabetes:  Currently taking Lantus 34 units once daily and if evening BG is >250 she will take 36 units, Jardiance 10mg daily, and Mounjaro 2.5mg daily.  This was started at last visit and replaced Bydureon once weekly.  Patient reports reports no issues with her medications, however, she states that she recently tore her meniscus, which caused her sugars to increase to around 500.  She states that she  increased her Lantus transiently around this time and also gave herself some Humalog.  Otherwise, sugars are staying around 130-150, sometimes in the 170s. She also has had some lows - 62 was the lowest; maybe 4 times total.  I do not have her CGM results as this is a phone visit.     Aspirin: Not indicated  Statin: Yes: Atorvastatin 20 mg daily  ACEi/ARB: Yes: Lisinopril 40 mg daily.     Urine Albumin:   Lab Results   Component Value Date    [facility]  01/25/2023      Comment:      Unable to calculate, urine albumin and/or urine creatinine is outside detectable limits.  Microalbuminuria is defined as an albumin:creatinine ratio of 17 to 299 for males and 25 to 299 for females. A ratio of albumin:creatinine of 300 or higher is indicative of overt proteinuria.  Due to biologic variability, positive results should be confirmed by a second, first-morning random or 24-hour timed urine specimen. If there is discrepancy, a third specimen is recommended. When 2 out of 3 results are in the microalbuminuria range, this is evidence for incipient nephropathy and warrants increased efforts at glucose control, blood pressure control, and institution of therapy with an angiotensin-converting-enzyme (ACE) inhibitor (if the patient can tolerate it).        Lab Results   Component Value Date    A1C 7.8 04/13/2023    A1C 13.2 05/01/2017    A1C 8.1 01/06/2017    A1C 8.5 05/23/2013    A1C 8.9 10/04/2012       Today's Vitals: There were no vitals taken for this visit.  ----------------      I spent 11 minutes with this patient today. Dr. Suazo (resident physician) was provided the recommendations above  in clinic today and Dr. Fan is the authorizing prescriber for this visit through the pharmacist collaborative practice agreement..All changes were made via collaborative practice agreement with Dr Fan, who is precepting today. A copy of the visit note was provided to the patient's provider(s).    A summary of these recommendations  was declined by the patient.    April Almazan, DomingoD     Telemedicine Visit Details  Type of service:  Telephone visit  Start Time: 8:00am  End Time: 8:11am      Medication Therapy Recommendations  Diabetes mellitus, type II, insulin dependent (H)    Current Medication: insulin glargine (LANTUS PEN) 100 UNIT/ML pen   Rationale: Dose too high - Dosage too high - Safety   Recommendation: Decrease Dose   Status: Accepted per CPA          Current Medication: tirzepatide (MOUNJARO) 2.5 MG/0.5ML pen (Discontinued)   Rationale: Dose too low - Dosage too low - Effectiveness   Recommendation: Increase Dose - Mounjaro 5 MG/0.5ML Sopn   Status: Accepted per CPA

## 2023-05-26 ENCOUNTER — VIRTUAL VISIT (OUTPATIENT)
Dept: PHARMACY | Facility: CLINIC | Age: 73
End: 2023-05-26
Payer: COMMERCIAL

## 2023-05-26 DIAGNOSIS — Z79.4 DIABETES MELLITUS, TYPE II, INSULIN DEPENDENT (H): Primary | ICD-10-CM

## 2023-05-26 DIAGNOSIS — E11.9 DIABETES MELLITUS, TYPE II, INSULIN DEPENDENT (H): Primary | ICD-10-CM

## 2023-05-26 PROCEDURE — 99207 PR NO CHARGE LOS: CPT | Performed by: PHARMACIST

## 2023-05-26 RX ORDER — TIRZEPATIDE 5 MG/.5ML
5 INJECTION, SOLUTION SUBCUTANEOUS
Qty: 2 ML | Refills: 0 | Status: SHIPPED | OUTPATIENT
Start: 2023-05-26 | End: 2023-06-22 | Stop reason: DRUGHIGH

## 2023-05-26 NOTE — PATIENT INSTRUCTIONS
It was great to meet with you today.  You may receive a survey via email or text message in the next few days about your MTM pharmacist or physician.  We value your experience and would be very thankful for your time with providing feedback on our clinic survey.      Increase Mounjaro to 5mg daily  Decrease Lantus to 30units daily

## 2023-06-09 ENCOUNTER — VIRTUAL VISIT (OUTPATIENT)
Dept: PHARMACY | Facility: CLINIC | Age: 73
End: 2023-06-09
Payer: COMMERCIAL

## 2023-06-09 DIAGNOSIS — Z79.4 DIABETES MELLITUS, TYPE II, INSULIN DEPENDENT (H): ICD-10-CM

## 2023-06-09 DIAGNOSIS — I10 ESSENTIAL HYPERTENSION: Primary | ICD-10-CM

## 2023-06-09 DIAGNOSIS — E11.9 DIABETES MELLITUS, TYPE II, INSULIN DEPENDENT (H): ICD-10-CM

## 2023-06-09 PROCEDURE — 99207 PR NO CHARGE LOS: CPT | Performed by: PHARMACIST

## 2023-06-09 RX ORDER — AMLODIPINE BESYLATE 5 MG/1
5 TABLET ORAL DAILY
Qty: 90 TABLET | Refills: 3 | Status: SHIPPED | OUTPATIENT
Start: 2023-06-09 | End: 2023-10-20

## 2023-06-09 NOTE — PROGRESS NOTES
Medication Therapy Management (MTM) Encounter    ASSESSMENT:                            Medication Adherence/Access: No issues identified    Type 2 Diabetes: Patient is meeting HbA1c goal of <8% but not <7%.  Patient is tolerating Mounjaro well - due for an increased dose in 2 weeks.  Patient elected to not make changes today but wait til next visit to increase Mounjaro and consider increasing Jardiance.       PLAN:                            Continue the good work!    Follow-up: 2 weeks     SUBJECTIVE/OBJECTIVE:                          Nichole Titus is a 73 year old female called for a follow-up visit.  Today's visit is a follow-up MTM visit from 5/26/23     Reason for visit: Diabetes follow up.    Allergies/ADRs: Reviewed in chart  Past Medical History: Reviewed in chart  Tobacco: She reports that she has never smoked. She has never used smokeless tobacco.  Social History     Tobacco Use     Smoking status: Never     Smokeless tobacco: Never   Substance Use Topics     Alcohol use: Yes     Alcohol/week: 1.7 standard drinks of alcohol     Comment: Alcoholic Drinks/day: once a week     Drug use: No       Medication Adherence/Access: no issues reported    Type 2 Diabetes:  Currently taking Mounjaro 5mg daily, Lantus 30-32 units daily, and Jardiance 10mg daily . Patient is not experiencing side effects.   Blood sugar monitoring: Continuous Glucose Monitor. 110 this am; usually in the 100s when waking; low 200s after food; has gotten a couple low readings (65, 62) at night after taking Lantus.    Symptoms of low blood sugar? none  Symptoms of high blood sugar? none  Aspirin: No  Statin: Yes: atorvastatin 20mg daily   ACEi/ARB: Yes: lisinopril 40mg daily.   Urine Albumin:   Lab Results   Component Value Date    [facility]  01/25/2023      Comment:      Unable to calculate, urine albumin and/or urine creatinine is outside detectable limits.  Microalbuminuria is defined as an albumin:creatinine ratio of 17 to 299 for males and  25 to 299 for females. A ratio of albumin:creatinine of 300 or higher is indicative of overt proteinuria.  Due to biologic variability, positive results should be confirmed by a second, first-morning random or 24-hour timed urine specimen. If there is discrepancy, a third specimen is recommended. When 2 out of 3 results are in the microalbuminuria range, this is evidence for incipient nephropathy and warrants increased efforts at glucose control, blood pressure control, and institution of therapy with an angiotensin-converting-enzyme (ACE) inhibitor (if the patient can tolerate it).        Lab Results   Component Value Date    A1C 7.8 04/13/2023    A1C 13.2 05/01/2017    A1C 8.1 01/06/2017    A1C 8.5 05/23/2013    A1C 8.9 10/04/2012       Today's Vitals: There were no vitals taken for this visit.  ----------------      I spent 12 minutes with this patient today. Dr. Suazo (resident physician) was provided the recommendations above  via routed note and Dr. Arnold is the authorizing prescriber for this visit through the pharmacist collaborative practice agreement.. A copy of the visit note was provided to the patient's provider(s).    A summary of these recommendations was sent via [employer].    April Almazan PharmD     Telemedicine Visit Details  Type of service:  Telephone visit  Start Time: 8:01am  End Time: 8:13am     Medication Therapy Recommendations  No medication therapy recommendations to display

## 2023-06-22 ENCOUNTER — VIRTUAL VISIT (OUTPATIENT)
Dept: PHARMACY | Facility: CLINIC | Age: 73
End: 2023-06-22
Payer: COMMERCIAL

## 2023-06-22 DIAGNOSIS — E11.9 DIABETES MELLITUS, TYPE II, INSULIN DEPENDENT (H): Primary | ICD-10-CM

## 2023-06-22 DIAGNOSIS — Z79.4 DIABETES MELLITUS, TYPE II, INSULIN DEPENDENT (H): Primary | ICD-10-CM

## 2023-06-22 PROCEDURE — 99207 PR NO CHARGE LOS: CPT | Performed by: PHARMACIST

## 2023-06-22 NOTE — PROGRESS NOTES
Medication Therapy Management (MTM) Encounter    ASSESSMENT:                            Medication Adherence/Access: No issues identified    Type 2 Diabetes: Patient is meeting HbA1c goal of <8% but not <7%.  Patient is experiencing some lows, so will back off on Lantus a little bit.  Additionally, patient is tolerating Mounjaro well and due for an increased dose next week.  I will send this dose in today. We will increase Jardiance in July when her current supply is exhausted.        PLAN:                            Decrease Lantus to 25 units  Increase Mounjaro to 7.5mg     Follow-up: 2 weeks with me   Mid- July - think about increasing Jardiance     SUBJECTIVE/OBJECTIVE:                          Nichole Titus is a 73 year old female called for a follow-up visit.  Today's visit is a follow-up MTM visit from 6/9/23     Reason for visit: Diabetes follow up.    Allergies/ADRs: Reviewed in chart  Past Medical History: Reviewed in chart  Tobacco: She reports that she has never smoked. She has never used smokeless tobacco.  Social History     Tobacco Use     Smoking status: Never     Smokeless tobacco: Never   Substance Use Topics     Alcohol use: Yes     Alcohol/week: 1.7 standard drinks of alcohol     Comment: Alcoholic Drinks/day: once a week     Drug use: No     Patient is in the process of moving - may need a letter of support for an emotional support dog.     Medication Adherence/Access: no issues reported    Type 2 Diabetes:    Mounjaro 5mg daily - tomorrow is last dose of 5mg daily  Lantus 30 units daily  Jardiance 10mg daily - 90 day supply filled 4/28/23  Patient is not experiencing side effects.   Blood sugar monitoring: Continuous Glucose Monitor. Some lows; this AM 88, yesterday 62; mid-200s evening   Symptoms of low blood sugar? none  Symptoms of high blood sugar? none  Aspirin: No  Statin: Yes: atorvastatin 20mg daily   ACEi/ARB: Yes: lisinopril 40mg daily.     Lab Results   Component Value Date    [facility]   01/25/2023      Comment:      Unable to calculate, urine albumin and/or urine creatinine is outside detectable limits.  Microalbuminuria is defined as an albumin:creatinine ratio of 17 to 299 for males and 25 to 299 for females. A ratio of albumin:creatinine of 300 or higher is indicative of overt proteinuria.  Due to biologic variability, positive results should be confirmed by a second, first-morning random or 24-hour timed urine specimen. If there is discrepancy, a third specimen is recommended. When 2 out of 3 results are in the microalbuminuria range, this is evidence for incipient nephropathy and warrants increased efforts at glucose control, blood pressure control, and institution of therapy with an angiotensin-converting-enzyme (ACE) inhibitor (if the patient can tolerate it).        Lab Results   Component Value Date    A1C 7.8 (H) 04/13/2023       Today's Vitals: There were no vitals taken for this visit.  ----------------      I spent 8 minutes with this patient today. Dr. Jacobs (resident physician) was provided the recommendations above  via routed note and Dr. Cline is the authorizing prescriber for this visit through the pharmacist collaborative practice agreement.. A copy of the visit note was provided to the patient's provider(s).    A summary of these recommendations was declined by the patient.    April Almazan, DomingoD     Telemedicine Visit Details  Type of service:  Telephone visit  Start Time: 8:05 AM  End Time: 8:12 AM     Medication Therapy Recommendations  Diabetes mellitus, type II, insulin dependent (H)    Current Medication: insulin glargine (LANTUS PEN) 100 UNIT/ML pen   Rationale: Dose too high - Dosage too high - Safety   Recommendation: Decrease Dose   Status: Accepted per CPA          Current Medication: tirzepatide (MOUNJARO) 5 MG/0.5ML pen (Discontinued)   Rationale: Dose too low - Dosage too low - Effectiveness   Recommendation: Increase Dose   Status: Accepted per CPA

## 2023-07-07 ENCOUNTER — VIRTUAL VISIT (OUTPATIENT)
Dept: PHARMACY | Facility: CLINIC | Age: 73
End: 2023-07-07
Payer: COMMERCIAL

## 2023-07-07 DIAGNOSIS — Z79.4 DIABETES MELLITUS, TYPE II, INSULIN DEPENDENT (H): Primary | ICD-10-CM

## 2023-07-07 DIAGNOSIS — E11.9 DIABETES MELLITUS, TYPE II, INSULIN DEPENDENT (H): Primary | ICD-10-CM

## 2023-07-07 PROCEDURE — 99207 PR NO CHARGE LOS: CPT | Performed by: PHARMACIST

## 2023-07-07 NOTE — PROGRESS NOTES
Medication Therapy Management (MTM) Encounter    ASSESSMENT:                            Medication Adherence/Access: No issues identified    Type 2 Diabetes: Patient is meeting HbA1c goal of <8% but not <7%.  Will not make any changes today.  Patient will be due for next dose increase of Moujaro on 7/28 so plan to prescribe that at visit 7/24 with Leonardo Suazo and Elia. We will increase Jardiance in July when her current supply is exhausted.        PLAN:                            Keep up the good work    Follow-up: 7/24 with Leonardo Singer    Increase Mounjaro    Consider increasing Jardiance    SUBJECTIVE/OBJECTIVE:                          Nichole Titus is a 73 year old female called for a follow-up visit.  Today's visit is a follow-up MTM visit from 6/22/23     Reason for visit: DM follow up.    Allergies/ADRs: Reviewed in chart  Past Medical History: Reviewed in chart  Tobacco: She reports that she has never smoked. She has never used smokeless tobacco.  Social History     Tobacco Use     Smoking status: Never     Smokeless tobacco: Never   Substance Use Topics     Alcohol use: Yes     Alcohol/week: 1.7 standard drinks of alcohol     Comment: Alcoholic Drinks/day: once a week     Drug use: No       Medication Adherence/Access: no issues reported    Type 2 Diabetes:    Lantus 25 - 30 at bedtime depending on blood sugars   Mounjaro 7.5mg weekly; today was her second dose at this dose; increase went fine   Jardiance 10mg - still has a few weeks left   Patient is not experiencing side effects.  Blood sugar monitoring: Continuous Glucose Monitor. Ranges (patient reported):   Sunday 263 - gave 32 units lantus; went down to 110 by 10pm   Yesterday and today was 110 in AM; Highest 160 in the AM; Usually in the 110s in the AM     Current diabetes symptoms: none  Urine Albumin:   Lab Results   Component Value Date    [facility]  01/25/2023      Comment:      Unable to calculate, urine albumin and/or urine creatinine  is outside detectable limits.  Microalbuminuria is defined as an albumin:creatinine ratio of 17 to 299 for males and 25 to 299 for females. A ratio of albumin:creatinine of 300 or higher is indicative of overt proteinuria.  Due to biologic variability, positive results should be confirmed by a second, first-morning random or 24-hour timed urine specimen. If there is discrepancy, a third specimen is recommended. When 2 out of 3 results are in the microalbuminuria range, this is evidence for incipient nephropathy and warrants increased efforts at glucose control, blood pressure control, and institution of therapy with an angiotensin-converting-enzyme (ACE) inhibitor (if the patient can tolerate it).        Lab Results   Component Value Date    A1C 7.8 (H) 04/13/2023       Today's Vitals: There were no vitals taken for this visit.  ----------------      I spent 10 minutes with this patient today. Dr. Suazo (resident physician) was provided the recommendations above  via routed note and Dr. Cleveland is the authorizing prescriber for this visit through the pharmacist collaborative practice agreement.. A copy of the visit note was provided to the patient's provider(s).    A summary of these recommendations was declined by the patient.    April Almazan, Kelly     Telemedicine Visit Details  Type of service:  Telephone visit  Start Time: 8:00am  End Time: 8:10am     Medication Therapy Recommendations  No medication therapy recommendations to display

## 2023-07-14 ENCOUNTER — TELEPHONE (OUTPATIENT)
Dept: PHARMACY | Facility: CLINIC | Age: 73
End: 2023-07-14
Payer: COMMERCIAL

## 2023-07-14 NOTE — TELEPHONE ENCOUNTER
Patient called me because earlier this week Dr Suazo said that he was going to fax in a to her new apartment building for an accommodation for emotional support dog.  She is reaching out because the apartment building says they have not received it yet.  I was able to locate the form on Dr. Suazo's MA's desk and faxed it in.  Called her apartment building and confirmed that they received it.  Relayed the message to the patient (had to leave voicemail).    April Almazan, DomingoD

## 2023-07-23 NOTE — PROGRESS NOTES
"Medication Therapy Management (MTM) Encounter    ASSESSMENT:                            Medication Adherence/Access: No issues identified  Since she is transferring care to us and will not longer see Dr. Chavez, sent refills for all meds to be from our clinic with a note to pharmacy that there is a change of provider and refill requests should no longer go to Dr. Chavez.     Type 2 Diabetes: Patient is meeting HbA1c goal of <8% but not <7%.  She has been on 7.5mg Mounjaro for 4 weeks now so can increase to 10mg for dose this Fri 7/28. Also can increase Jardiance dose to 25mg daily. Lantus is best used as the same dose every day and not \"sliding scale\". Discussed with patient and she agreed. In addition, with increases in Mounjaro and Jardiance doses, and with her currently getting lows on an average Lantus dose of 24 units daily, can make a fairly large Lantus dose decrease. I would rather have her blood sugars a little higher for a short time rather than lows.  Diabetes meds/doses appropriate for her renal function.    Hyperlipidemia:  Possible side effect from statin; she would benefit from checking CK. In addition, last lipid profile was from 2016 so it would be beneficial to recheck her lipid profile.    HTN/edema:  BP meeting goal of < 130/80. Leg cramps could possibly be from hypokalemia as she is on furosemide, although she has not taken it for the past week or so. It would be beneficial to check BMP for potassium but also to assess renal function.      PLAN:                            Increase Jardiance to 25mg daily - sent Rx  Increase Mounjaro to 10mg weekly - sent Rx  Decrease Lantus to 14 units daily and take same dose every day   Check BMP - monitor renal function but also because she is having muscle cramping so would like to check potassium  Check CK for muscle cramping symptoms on statin  Check lipid profile  Resent prescriptions that were previously prescribed by Dr. Tye Chavez so refill requests will " now come to us: albuterol, alendronate, atorvastatin, vitamin D, gabapentin, lisinopril. Other meds were already prescribed by us. Tizanidine, benzonatate and diazepam were only short term meds. Meclizine and hydroxyzine were not filled in the past year. I removed those 4 meds from the med list.    Follow-up:   2 weeks with Dr. Almazan for phone visit  3 months with Dr. Suazo    Medication issues to be addressed at a future visit    Depending on what CK comes back as, consider changing statin to rosuvastatin, which is more water soluble. Could also lower the dose of atorvastatin. She has had a CVA so staying on a statin is preferred. Jan 2023 rhabdo was determined to not be from the statin.      SUBJECTIVE/OBJECTIVE:                          Nichole Titus is a 73 year old female seen for a follow-up visit.  Today's visit is a follow-up MTM visit from7/7/23. Co-visit with Dr. Suzao.    Reason for visit: MTM follow up.    Allergies/ADRs: Reviewed in chart  Past Medical History: Reviewed in chart  Tobacco: She reports that she has never smoked. She has never used smokeless tobacco.  Social History     Tobacco Use    Smoking status: Never    Smokeless tobacco: Never   Substance Use Topics    Alcohol use: Yes     Alcohol/week: 1.7 standard drinks of alcohol     Comment: Alcoholic Drinks/day: once a week    Drug use: No       Medication Adherence/Access: no issues reported  Many of her meds are prescribed by Dr. Tye Chavez, and refill requests are still going to him. A note in May discussed that she wanted to change to Dr. Suazo. Discussed with her and Dr. Chavez moved to a different clinic and she is transferring care to us and is not longer seeing Dr. Chavez.     Type 2 Diabetes:    Currently taking Lantus 0 - 20 - 34 at bedtime depending on blood sugars (she adjusts on her own based on her blood sugars), Mounjaro 7.5mg weekly (increased on 6/30), Jardiance 10mg. Over the past 14 days, she took 0 units Lantus 3 times,  "took 20 units 2 times and took between 26-34 the other days. The average dose was 24 units per day.  Patient is not experiencing side effects.   Blood sugar monitoring: Continuous Glucose Monitor. Ranges see below):       7 lows; one down to 55; all in very early morning hours while asleep    Urine Albumin:   Lab Results   Component Value Date    UMALCR  01/25/2023      Comment:      Unable to calculate, urine albumin and/or urine creatinine is outside detectable limits.  Microalbuminuria is defined as an albumin:creatinine ratio of 17 to 299 for males and 25 to 299 for females. A ratio of albumin:creatinine of 300 or higher is indicative of overt proteinuria.  Due to biologic variability, positive results should be confirmed by a second, first-morning random or 24-hour timed urine specimen. If there is discrepancy, a third specimen is recommended. When 2 out of 3 results are in the microalbuminuria range, this is evidence for incipient nephropathy and warrants increased efforts at glucose control, blood pressure control, and institution of therapy with an angiotensin-converting-enzyme (ACE) inhibitor (if the patient can tolerate it).        Lab Results   Component Value Date    A1C 6.8 (H) 07/24/2023     GFR Estimate   Date Value Ref Range Status   01/25/2023 47 (L) >60 mL/min/1.73m2 Final     Comment:     eGFR calculated using 2021 CKD-EPI equation.   05/28/2018 52 (L) >60 mL/min/1.73m2 Final       Hyperlipidemia:   Taking atorvastatin 20mg daily. She has been getting cramping pain in her lower leg muscles- mostly left but is in both legs and feet. She describes it like a \"charley horse\". She has a history of elevated CK/rhabdomyolysis in January, but this was thought to be due to hypoglycemia and a seizure (hospitalization January 2023); she was discharged on her statin. She also has a history of leg cramps on her problem list.    The ASCVD Risk score (Alma DK, et al., 2019) failed to calculate for the " "following reasons:    The patient has a prior MI or stroke diagnosis  Recent Labs   Lab Test 12/03/16  0713   CHOL 197   HDL 65      TRIG 78       Hypertension/edema:   Currently taking amlodipine 5mg daily, lisinopril 40mg daily, and furosemide 20mg daily  Patient reports the following medication side effects: Leg cramps (not sure if this is an adverse effect, but leg cramps could be from low potassium from lasix). She has not taken furosemide over past week to see if it helped but the leg cramps persisted    BP Readings from Last 3 Encounters:   07/24/23 103/67   07/24/23 103/67   05/13/23 108/72     BP Readings from Last 1 Encounters:   07/24/23 103/67     Pulse Readings from Last 1 Encounters:   07/24/23 60     Wt Readings from Last 1 Encounters:   07/24/23 138 lb (62.6 kg)     Ht Readings from Last 1 Encounters:   01/25/23 5' 0.3\" (1.532 m)     Estimated body mass index is 26.68 kg/m  as calculated from the following:    Height as of 1/25/23: 5' 0.3\" (1.532 m).    Weight as of this encounter: 138 lb (62.6 kg).    Temp Readings from Last 1 Encounters:   07/24/23 97.5  F (36.4  C) (Oral)         ----------------      I spent30 minutes with this patient today. Dr. Suazo (resident physician) was provided the recommendations in clinic today and Dr. Cleveland is the authorizing prescriber for this visit through the pharmacist collaborative practice agreement.. A copy of the visit note was provided to the patient's provider(s).    A summary of these recommendations was given to the patient (see AVS by Dr. Suazo).    Elis Wade, PharmD          Medication Therapy Recommendations  Diabetes mellitus, type II, insulin dependent (H)    Current Medication: empagliflozin (JARDIANCE) 10 MG TABS tablet (Discontinued)   Rationale: Dose too low - Dosage too low - Effectiveness   Recommendation: Increase Dose - Jardiance 25 MG Tabs   Status: Accepted per Provider          Current Medication: insulin glargine (LANTUS PEN) " 100 UNIT/ML pen   Rationale: Dose too high - Dosage too high - Safety   Recommendation: Decrease Dose   Status: Accepted per Provider          Current Medication: tirzepatide (MOUNJARO) 7.5 MG/0.5ML pen (Discontinued)   Rationale: Dose too low - Dosage too low - Effectiveness   Recommendation: Increase Dose - Mounjaro 10 MG/0.5ML Sopn   Status: Accepted per Provider

## 2023-07-24 ENCOUNTER — OFFICE VISIT (OUTPATIENT)
Dept: PHARMACY | Facility: CLINIC | Age: 73
End: 2023-07-24
Payer: COMMERCIAL

## 2023-07-24 ENCOUNTER — OFFICE VISIT (OUTPATIENT)
Dept: FAMILY MEDICINE | Facility: CLINIC | Age: 73
End: 2023-07-24
Payer: COMMERCIAL

## 2023-07-24 VITALS
SYSTOLIC BLOOD PRESSURE: 103 MMHG | HEART RATE: 60 BPM | WEIGHT: 138 LBS | HEART RATE: 60 BPM | OXYGEN SATURATION: 98 % | RESPIRATION RATE: 16 BRPM | BODY MASS INDEX: 26.68 KG/M2 | DIASTOLIC BLOOD PRESSURE: 67 MMHG | BODY MASS INDEX: 26.68 KG/M2 | DIASTOLIC BLOOD PRESSURE: 67 MMHG | TEMPERATURE: 97.5 F | OXYGEN SATURATION: 98 % | SYSTOLIC BLOOD PRESSURE: 103 MMHG | TEMPERATURE: 97.5 F | RESPIRATION RATE: 16 BRPM | WEIGHT: 138 LBS

## 2023-07-24 DIAGNOSIS — E78.2 MULTIPLE-TYPE HYPERLIPIDEMIA: Chronic | ICD-10-CM

## 2023-07-24 DIAGNOSIS — I10 ESSENTIAL HYPERTENSION: ICD-10-CM

## 2023-07-24 DIAGNOSIS — I10 HYPERTENSION, BENIGN ESSENTIAL, GOAL BELOW 140/90: Chronic | ICD-10-CM

## 2023-07-24 DIAGNOSIS — E55.9 VITAMIN D DEFICIENCY: ICD-10-CM

## 2023-07-24 DIAGNOSIS — R06.02 SHORTNESS OF BREATH: ICD-10-CM

## 2023-07-24 DIAGNOSIS — Z79.4 DIABETES MELLITUS, TYPE II, INSULIN DEPENDENT (H): Primary | ICD-10-CM

## 2023-07-24 DIAGNOSIS — G62.9 PERIPHERAL POLYNEUROPATHY: ICD-10-CM

## 2023-07-24 DIAGNOSIS — E11.9 DIABETES MELLITUS, TYPE II, INSULIN DEPENDENT (H): Primary | ICD-10-CM

## 2023-07-24 DIAGNOSIS — N18.30 STAGE 3 CHRONIC KIDNEY DISEASE, UNSPECIFIED WHETHER STAGE 3A OR 3B CKD (H): ICD-10-CM

## 2023-07-24 DIAGNOSIS — E11.9 DIABETES (H): Primary | ICD-10-CM

## 2023-07-24 DIAGNOSIS — R25.2 LEG CRAMPS: ICD-10-CM

## 2023-07-24 LAB
ANION GAP SERPL CALCULATED.3IONS-SCNC: 12 MMOL/L (ref 7–15)
BUN SERPL-MCNC: 11.3 MG/DL (ref 8–23)
CALCIUM SERPL-MCNC: 10.2 MG/DL (ref 8.8–10.2)
CHLORIDE SERPL-SCNC: 107 MMOL/L (ref 98–107)
CHOLEST SERPL-MCNC: 130 MG/DL
CK SERPL-CCNC: 72 U/L (ref 26–192)
CREAT SERPL-MCNC: 0.94 MG/DL (ref 0.51–0.95)
DEPRECATED HCO3 PLAS-SCNC: 23 MMOL/L (ref 22–29)
GFR SERPL CREATININE-BSD FRML MDRD: 64 ML/MIN/1.73M2
GLUCOSE SERPL-MCNC: 114 MG/DL (ref 70–99)
HBA1C MFR BLD: 6.8 % (ref 0–5.6)
HDLC SERPL-MCNC: 56 MG/DL
LDLC SERPL CALC-MCNC: 57 MG/DL
NONHDLC SERPL-MCNC: 74 MG/DL
POTASSIUM SERPL-SCNC: 4.7 MMOL/L (ref 3.4–5.3)
SODIUM SERPL-SCNC: 142 MMOL/L (ref 136–145)
TRIGL SERPL-MCNC: 86 MG/DL

## 2023-07-24 PROCEDURE — 80061 LIPID PANEL: CPT

## 2023-07-24 PROCEDURE — 83036 HEMOGLOBIN GLYCOSYLATED A1C: CPT

## 2023-07-24 PROCEDURE — 99207 PR NO CHARGE LOS: CPT | Performed by: PHARMACIST

## 2023-07-24 PROCEDURE — 36415 COLL VENOUS BLD VENIPUNCTURE: CPT

## 2023-07-24 PROCEDURE — 82550 ASSAY OF CK (CPK): CPT

## 2023-07-24 PROCEDURE — 91313 COVID-19 BIVALENT 18+ (MODERNA): CPT

## 2023-07-24 PROCEDURE — 80048 BASIC METABOLIC PNL TOTAL CA: CPT

## 2023-07-24 PROCEDURE — 99213 OFFICE O/P EST LOW 20 MIN: CPT | Mod: 25

## 2023-07-24 PROCEDURE — 0134A COVID-19 BIVALENT 18+ (MODERNA): CPT

## 2023-07-24 RX ORDER — ALBUTEROL SULFATE 90 UG/1
2 AEROSOL, METERED RESPIRATORY (INHALATION) EVERY 6 HOURS PRN
Qty: 18 G | Refills: 1 | Status: SHIPPED | OUTPATIENT
Start: 2023-07-24

## 2023-07-24 RX ORDER — CHOLECALCIFEROL (VITAMIN D3) 50 MCG
1 TABLET ORAL DAILY
Qty: 90 TABLET | Refills: 3 | Status: SHIPPED | OUTPATIENT
Start: 2023-07-24

## 2023-07-24 RX ORDER — LISINOPRIL 40 MG/1
40 TABLET ORAL DAILY
Qty: 90 TABLET | Refills: 3 | Status: SHIPPED | OUTPATIENT
Start: 2023-07-24 | End: 2023-08-18

## 2023-07-24 RX ORDER — ALENDRONATE SODIUM 70 MG/1
70 TABLET ORAL
Qty: 12 TABLET | Refills: 3 | Status: SHIPPED | OUTPATIENT
Start: 2023-07-24 | End: 2024-09-11

## 2023-07-24 RX ORDER — TIRZEPATIDE 10 MG/.5ML
10 INJECTION, SOLUTION SUBCUTANEOUS
Qty: 2 ML | Refills: 0 | Status: SHIPPED | OUTPATIENT
Start: 2023-07-24 | End: 2023-08-18 | Stop reason: DRUGHIGH

## 2023-07-24 RX ORDER — GABAPENTIN 100 MG/1
300 CAPSULE ORAL 2 TIMES DAILY
Qty: 180 CAPSULE | Refills: 3 | Status: SHIPPED | OUTPATIENT
Start: 2023-07-24 | End: 2024-06-21 | Stop reason: DRUGHIGH

## 2023-07-24 RX ORDER — ATORVASTATIN CALCIUM 20 MG/1
20 TABLET, FILM COATED ORAL DAILY
Qty: 90 TABLET | Refills: 3 | Status: SHIPPED | OUTPATIENT
Start: 2023-07-24 | End: 2023-10-27

## 2023-07-24 NOTE — PATIENT INSTRUCTIONS
Thank you for coming in to see us today!    - Please make all medication changes recommended by the pharmacist, Dr. Wade  - Continue applying lotion as needed to the foot  - Follow-up with Dr. Almazan in 2-4 weeks for diabetes follow-up  - Follow-up with me in 3 months    Increase Jardiance to 25mg daily    Increase Mounjaro to 10mg weekly   Decrease Lantus to 14 units daily and take same dose every day     Logan Suazo, DO

## 2023-07-24 NOTE — PROGRESS NOTES
"  Assessment & Plan     Diabetes mellitus, type II, insulin dependent (H)  Patient presents for diabetes follow-up.  She is currently taking Jardiance, Mounjaro, and Lantus.  She is attempting to titrate her Lantus to optimize her blood sugar, but it was explained to her that this could be dangerous because she occasionally goes low in the middle the night.  Dr. Wade is scheduled to see the patient following my visit and will provide further education and medication adjustments.  - Hemoglobin A1c  - See Dr. Wade' note for occasional adjustments    Leg cramping  Patient complains of intermittent leg cramping.  Will check for electrolyte abnormality versus myalgias secondary to statin use  - CK ordered  - BMP ordered     BMI:   Estimated body mass index is 26.68 kg/m  as calculated from the following:    Height as of 1/25/23: 1.532 m (5' 0.3\").    Weight as of this encounter: 62.6 kg (138 lb).     Return in about 3 months (around 10/24/2023) for Follow up, with me.    Logan Suazo DO  [facility]    Augustin Varela is a 73 year old, presenting for the following health issues:  Diabetes        7/24/2023     9:20 AM   Additional Questions   Roomed by Negro ECHEVERRIA   Accompanied by self     Patient presents for diabetes follow-up.  She has been tracking her blood sugars at 6 AM, 8 AM, and 10 PM.  She has been trying to titrate her Lantus to optimize her morning blood sugar.  However, she has occasionally gotten low and I explained that this could be potentially dangerous.  Dr. Wade is scheduled to see the patient after me, and she will provide further education and medication adjustments.  Patient also complained of intermittent leg cramping.  She is wondering if this was due to her Lasix, and she stopped taking it for the past 6 days.     Review of Systems   All other systems reviewed and are negative.         Objective    /67   Pulse 60   Temp 97.5  F (36.4  C) (Oral)   Resp 16   " Wt 62.6 kg (138 lb)   SpO2 98%   BMI 26.68 kg/m    Body mass index is 26.68 kg/m .  Physical Exam  Vitals reviewed.   Constitutional:       Appearance: Normal appearance.   Eyes:      Extraocular Movements: Extraocular movements intact.      Conjunctiva/sclera: Conjunctivae normal.   Pulmonary:      Effort: Pulmonary effort is normal.   Skin:     Comments: Rash with peeling skin on medial aspect of left foot.  Not itchy, painful, or bothersome to patient.   Neurological:      General: No focal deficit present.      Mental Status: She is alert and oriented to person, place, and time.

## 2023-07-24 NOTE — PROGRESS NOTES
Preceptor Attestation:    I discussed the patient with the resident and evaluated the patient in person. I have verified the content of the note, which accurately reflects my assessment of the patient and the plan of care.   Supervising Physician:  Osmin Jones MD.

## 2023-07-24 NOTE — Clinical Note
Just routing my note as an FYI per [facility] requirement. We discussed in clinic today. April- I'm copying you as an FYI as she will follow up with you next Fri.

## 2023-07-24 NOTE — NURSING NOTE
Prior to immunization administration, verified patients identity using patient s name and date of birth. Please see Immunization Activity for additional information.     Screening Questionnaire for Adult Immunization    Are you sick today?   No   Do you have allergies to medications, food, a vaccine component or latex?   No   Have you ever had a serious reaction after receiving a vaccination?   No   Do you have a long-term health problem with heart, lung, kidney, or metabolic disease (e.g., diabetes), asthma, a blood disorder, no spleen, complement component deficiency, a cochlear implant, or a spinal fluid leak?  Are you on long-term aspirin therapy?   No   Do you have cancer, leukemia, HIV/AIDS, or any other immune system problem?   No   Do you have a parent, brother, or sister with an immune system problem?   No   In the past 3 months, have you taken medications that affect  your immune system, such as prednisone, other steroids, or anticancer drugs; drugs for the treatment of rheumatoid arthritis, Crohn s disease, or psoriasis; or have you had radiation treatments?   No   Have you had a seizure, or a brain or other nervous system problem?   No   During the past year, have you received a transfusion of blood or blood    products, or been given immune (gamma) globulin or antiviral drug?   No   For women: Are you pregnant or is there a chance you could become       pregnant during the next month?   No   Have you received any vaccinations in the past 4 weeks?   No     Immunization questionnaire answers were all negative.      Patient instructed to remain in clinic for 15 minutes afterwards, and to report any adverse reactions.     Screening performed by Naren Arreola CMA on 7/24/2023 at 10:19 AM.

## 2023-07-26 NOTE — RESULT ENCOUNTER NOTE
Patient to inform her of lab results.  Patient states her leg cramping is still present, but improved.  I recommended that she take a holiday from her statin medication for about 1 week.  If her cramping improves, then we will try and find a different medication.  If her cramping persist, she should go back on the medication because that is not the etiology of her cramping.    Logan Suazo, DO

## 2023-07-27 ENCOUNTER — TELEPHONE (OUTPATIENT)
Dept: FAMILY MEDICINE | Facility: CLINIC | Age: 73
End: 2023-07-27
Payer: COMMERCIAL

## 2023-07-27 NOTE — TELEPHONE ENCOUNTER
Patient reporting she has been unable to get her [facility] d/t pharmacy being out of supply  Patient would like to know what she should do: is it okay to miss a dose or two, is there something she can take in its place?  Patient states she has called several pharmacies unsuccessfully     Note routed to Dr Suazo and Pharm D

## 2023-07-27 NOTE — TELEPHONE ENCOUNTER
[facility] phone call message- general phone call:    Reason for call:     Patient would like Dr. Suazo to return her phone call.     Return call needed: Yes    OK to leave a message on voice mail? Yes    Primary language: English      needed? No    Call taken on July 27, 2023 at 2:40 PM by Jaci Arreola

## 2023-07-28 NOTE — TELEPHONE ENCOUNTER
Message left for pt relaying requested information    Bettina Valdovinos RN on 7/28/2023 at 4:28 PM     Flu vaccine received 2020  Covid vaccine 2nd dose Moderna 1/28/2021

## 2023-07-30 DIAGNOSIS — E03.9 ACQUIRED HYPOTHYROIDISM: ICD-10-CM

## 2023-07-31 ENCOUNTER — TELEPHONE (OUTPATIENT)
Dept: FAMILY MEDICINE | Facility: CLINIC | Age: 73
End: 2023-07-31
Payer: COMMERCIAL

## 2023-07-31 RX ORDER — FUROSEMIDE 20 MG
TABLET ORAL
Qty: 30 TABLET | Refills: 1 | Status: SHIPPED | OUTPATIENT
Start: 2023-07-31 | End: 2024-02-20

## 2023-07-31 NOTE — TELEPHONE ENCOUNTER
"Patient reporting dizziness and low b/p   92/70 p85  99/77 p 88  States she felt like she was \"going to Pass out\"  Takes amlodipine and lisinopril in am has not taken today waiting for recommendations    Note routed to Dr Gabriele Valdovinos RN on 7/31/2023 at 9:04 AM          "

## 2023-07-31 NOTE — TELEPHONE ENCOUNTER
[facility] phone call message-patient reporting a symptom:     Symptom: low bp dizzy     Same Day Visit Offered: would like to talk to the DR     Additional comments:     OK to leave message on voice mail? Yes    Primary language: English      needed? No    Call taken on July 31, 2023 at 8:14 AM by Tommy Cameron

## 2023-08-04 ENCOUNTER — VIRTUAL VISIT (OUTPATIENT)
Dept: PHARMACY | Facility: CLINIC | Age: 73
End: 2023-08-04
Payer: COMMERCIAL

## 2023-08-04 DIAGNOSIS — E11.9 TYPE 2 DIABETES MELLITUS WITHOUT COMPLICATION, WITHOUT LONG-TERM CURRENT USE OF INSULIN (H): Primary | ICD-10-CM

## 2023-08-04 DIAGNOSIS — I10 HYPERTENSION, BENIGN ESSENTIAL, GOAL BELOW 140/90: ICD-10-CM

## 2023-08-04 DIAGNOSIS — I71.20 THORACIC AORTIC ANEURYSM WITHOUT RUPTURE, UNSPECIFIED PART (H): ICD-10-CM

## 2023-08-04 DIAGNOSIS — R42 DIZZINESS: ICD-10-CM

## 2023-08-04 DIAGNOSIS — M79.10 MYALGIA: ICD-10-CM

## 2023-08-04 PROCEDURE — 99207 PR NO CHARGE LOS: CPT | Performed by: PHARMACIST

## 2023-08-04 NOTE — PATIENT INSTRUCTIONS
It was great to meet with you today.  You may receive a survey via email or text message in the next few days about your MTM pharmacist or physician.  We value your experience and would be very thankful for your time with providing feedback on our clinic survey.      Check blood pressures daily at home; write them down and bring them to work for the next visit.

## 2023-08-04 NOTE — PROGRESS NOTES
Medication Therapy Management (MTM) Encounter    ASSESSMENT:                            Medication Adherence/Access: No issues identified    Hypertension/Dizziness:   Patient is meeting a BP goal of <130/80 and dizziness is currently resolved.  Patient could benefit from checking her blood pressure more at home so we can see if she is actually getting hypotensive.  Given her better diabetes control, and weight loss, she may not need 2 blood pressure agents.    Hyperlipidemia/Myalgias:  Needs improvement.  Myalgias have resolved by given this patient's aneurysm, she should be in a high intensity statin.  Deferred this today, but recommend rosuvastatin 20mg daily which is more water soluble and potentially less likely to cause muscle pain.     Type 2 Diabetes:   Patient is meeting A1c goal of < 7%.      PLAN:                            Check blood pressures daily at home; write them down and bring them to work for the next visit.    Follow-up: 2 weeks with me  Medication issues to be addressed at a future visit: start rosuvastatin; consider increase of Mounjaro and decrease lantus.     SUBJECTIVE/OBJECTIVE:                          Nichole Titus is a 73 year old female seen for a follow-up visit from 7/24/23.       Reason for visit: Diabetes, medication follow up.    Allergies/ADRs: Reviewed in chart  Past Medical History: Reviewed in chart  Tobacco: She reports that she has never smoked. She has never used smokeless tobacco.  Social History     Tobacco Use    Smoking status: Never    Smokeless tobacco: Never   Substance Use Topics    Alcohol use: Yes     Alcohol/week: 1.7 standard drinks of alcohol     Comment: Alcoholic Drinks/day: once a week    Drug use: No       Medication Adherence/Access: no issues reported    Hypertension, Dizziness:   Amlodipine 5mg daily  Lisinopril 40mg daily  Called in 7/31 with dizziness and low blood pressure.  MD directed her to hold both blood pressure medications that day and it sounds  like she only held the lisinopril.  Patient states that the dizziness has resolved but she is going to keep an eye on her blood pressure.   Patient self-monitors blood pressure.  Home BP monitoring in range of 120s/70s mm Hg.    BP Readings from Last 3 Encounters:   07/24/23 103/67   07/24/23 103/67   05/13/23 108/72     Pulse Readings from Last 3 Encounters:   07/24/23 60   07/24/23 60   05/13/23 68         Hyperlipidemia/Myalgia:   no current medications  Was having myalgias; Dr Suazo instructed patient to stop atorvastatin 20mg daily at his last visit and patients states myalgias have resolved.   The ASCVD Risk score (Alma DK, et al., 2019) failed to calculate for the following reasons:    The patient has a prior MI or stroke diagnosis Patient states that she has never had an MI or stroke but charge review shows that she has a thoracic aortic aneurysm.   Recent Labs   Lab Test 07/24/23  1029 12/03/16  0713   CHOL 130 197   HDL 56 65   LDL 57 116   TRIG 86 78     Type 2 Diabetes:    Lantus 14 units daily - decreased at last visit  Mounjaro 10mg weekly - increased this past Monday (had to switch to Monday because could not get it from her pharmacy   Jardiance 25mg - also started Monday     Patient is not experiencing side effects.  Blood sugar monitoring: Continuous Glucose Monitor 120s in morning; higher at night  Current diabetes symptoms: none  Urine Albumin:   Lab Results   Component Value Date    UMALCR  01/25/2023      Comment:      Unable to calculate, urine albumin and/or urine creatinine is outside detectable limits.  Microalbuminuria is defined as an albumin:creatinine ratio of 17 to 299 for males and 25 to 299 for females. A ratio of albumin:creatinine of 300 or higher is indicative of overt proteinuria.  Due to biologic variability, positive results should be confirmed by a second, first-morning random or 24-hour timed urine specimen. If there is discrepancy, a third specimen is recommended. When 2  out of 3 results are in the microalbuminuria range, this is evidence for incipient nephropathy and warrants increased efforts at glucose control, blood pressure control, and institution of therapy with an angiotensin-converting-enzyme (ACE) inhibitor (if the patient can tolerate it).        Lab Results   Component Value Date    A1C 6.8 (H) 07/24/2023         Today's Vitals: There were no vitals taken for this visit.  ----------------      I spent 15 minutes with this patient today. Dr. Suazo (resident physician) was provided the recommendations above  via routed note and Dr. Arnold is the authorizing prescriber for this visit through the pharmacist collaborative practice agreement.. A copy of the visit note was provided to the patient's provider(s).    A summary of these recommendations was sent via [facility].    April Almazan PharmD     Telemedicine Visit Details  Type of service:  Telephone visit  Start Time:  8:00am  End Time:  8:15am     Medication Therapy Recommendations  Hypertension, benign essential, goal below 140/90    Current Medication: lisinopril (ZESTRIL) 40 MG tablet   Rationale: Medication requires monitoring - Needs additional monitoring   Recommendation: Self-Monitoring   Status: Patient Agreed - Adherence/Education

## 2023-08-12 ENCOUNTER — HEALTH MAINTENANCE LETTER (OUTPATIENT)
Age: 73
End: 2023-08-12

## 2023-08-18 ENCOUNTER — VIRTUAL VISIT (OUTPATIENT)
Dept: PHARMACY | Facility: CLINIC | Age: 73
End: 2023-08-18
Payer: COMMERCIAL

## 2023-08-18 DIAGNOSIS — I10 ESSENTIAL HYPERTENSION: ICD-10-CM

## 2023-08-18 DIAGNOSIS — M79.10 MYALGIA: ICD-10-CM

## 2023-08-18 DIAGNOSIS — Z79.4 DIABETES MELLITUS, TYPE II, INSULIN DEPENDENT (H): Primary | ICD-10-CM

## 2023-08-18 DIAGNOSIS — B37.31 CANDIDIASIS OF VAGINA: ICD-10-CM

## 2023-08-18 DIAGNOSIS — E11.9 DIABETES MELLITUS, TYPE II, INSULIN DEPENDENT (H): Primary | ICD-10-CM

## 2023-08-18 DIAGNOSIS — E78.2 MULTIPLE-TYPE HYPERLIPIDEMIA: ICD-10-CM

## 2023-08-18 PROCEDURE — 99207 PR NO CHARGE LOS: CPT | Performed by: PHARMACIST

## 2023-08-18 RX ORDER — TIRZEPATIDE 12.5 MG/.5ML
12.5 INJECTION, SOLUTION SUBCUTANEOUS
Qty: 2 ML | Refills: 0 | Status: SHIPPED | OUTPATIENT
Start: 2023-08-18 | End: 2023-09-08

## 2023-08-18 RX ORDER — LISINOPRIL 20 MG/1
20 TABLET ORAL DAILY
Qty: 90 TABLET | Refills: 3 | Status: SHIPPED | OUTPATIENT
Start: 2023-08-18 | End: 2024-05-17

## 2023-08-18 NOTE — Clinical Note
FYI only - pharmacy department requires that I route this note to you.  PLEASE SEE NOTE on fluconazole prescription.   April Almazan, DomingoD

## 2023-08-18 NOTE — PROGRESS NOTES
Medication Therapy Management (MTM) Encounter    ASSESSMENT:                            Medication Adherence/Access: No issues identified    Type 2 Diabetes:   Patient is meeting A1c goal of < 7%.  Due for increase of Mounjaro today for increase in 10 days.     Hypertension/Dizziness:   Patient is meeting a BP goal of <130/80 and dizziness is currently resolved.  Patient could benefit from either decreased lisinopril dose or stopping amlopidine.  Patient elected to decrease lisinopril dose.     Hyperlipidemia/Myalgias:  Needs improvement.  Discussed restarting a different agent today (rosuvastatin) given her elevated risk (DM, aneurysm) vs waiting a few months to see what her cholesterol levels are off medication.  Patient elected the latter.    History of Vaginal Candidiasis:  Explained to patient that fluconazole is out of my collaborative practice agreement with Dr Suazo but I would forward her request (as needed fluconazole prescription) to Dr Suazo for him to decide whether this is appropriate.  Explained to patient that this is somewhat due to the Jardiance she takes for diabetes.        PLAN:                            Decrease lisinopril to 20mg daily   Increase Mounjaro to 12.5mg daily (starts on 8/28)    Follow-up: 3 weeks with myself    SUBJECTIVE/OBJECTIVE:                          Nichole Titus is a 73 year old female called for a follow-up visit from 8/4/23.       Reason for visit: Diabetes, Hypertension follow up .    Allergies/ADRs: Reviewed in chart  Past Medical History: Reviewed in chart  Tobacco: She reports that she has never smoked. She has never used smokeless tobacco.  Social History     Tobacco Use    Smoking status: Never    Smokeless tobacco: Never   Substance Use Topics    Alcohol use: Yes     Alcohol/week: 1.7 standard drinks of alcohol     Comment: Alcoholic Drinks/day: once a week    Drug use: No       Medication Adherence/Access: no issues reported    Type 2 Diabetes:    Lantus 14  units daily  Mounjaro 10mg weekly - needs next dose   Jardiance 25mg     Patient is not experiencing side effects.  Blood sugar monitoring: Continuous Glucose Monitor 99 has been the lowest; low 200s highest   Current diabetes symptoms: none    Urine Albumin:   Lab Results   Component Value Date    UMALCR  01/25/2023      Comment:      Unable to calculate, urine albumin and/or urine creatinine is outside detectable limits.  Microalbuminuria is defined as an albumin:creatinine ratio of 17 to 299 for males and 25 to 299 for females. A ratio of albumin:creatinine of 300 or higher is indicative of overt proteinuria.  Due to biologic variability, positive results should be confirmed by a second, first-morning random or 24-hour timed urine specimen. If there is discrepancy, a third specimen is recommended. When 2 out of 3 results are in the microalbuminuria range, this is evidence for incipient nephropathy and warrants increased efforts at glucose control, blood pressure control, and institution of therapy with an angiotensin-converting-enzyme (ACE) inhibitor (if the patient can tolerate it).        Lab Results   Component Value Date    A1C 6.8 (H) 07/24/2023     Hypertension:   Amlodipine 5mg daily   Lisinopril 40mg daily   Patient reports the following medication side effects: maybe 1 or 2 times getting dizzy or lightheaded.  Patient self-monitors blood pressure.  Home BP monitoring 100s-120s/80s..    BP Readings from Last 3 Encounters:   07/24/23 103/67   07/24/23 103/67   05/13/23 108/72     Pulse Readings from Last 3 Encounters:   07/24/23 60   07/24/23 60   05/13/23 68       Hyperlipidemia:   no current medications - stopped atorvastatin about 4 weeks ago d/t muscle pain.  Pain has not resolved.    Recent Labs   Lab Test 07/24/23  1029 12/03/16  0713   CHOL 130 197   HDL 56 65   LDL 57 116   TRIG 86 78     History of Vaginal Candidiasis:  Patient states that she gets 1-2 yeast infections per month. She typically  uses OTC miconazole for this, which helps. She states that her previous provider would give her a prescription for eight fluconazole tablets which she could use as needed when she would get these.     Today's Vitals: There were no vitals taken for this visit.  ----------------      I spent 15 minutes with this patient today. Dr. Suazo (resident physician) was provided the recommendations above  via routed note and Dr. Hewitt is the authorizing prescriber for this visit through the pharmacist collaborative practice agreement.. A copy of the visit note was provided to the patient's provider(s).    A summary of these recommendations was declined by the patient.    April Almazan, DomingoD     Telemedicine Visit Details  Type of service:  Telephone visit  Start Time:  8:01am  End Time:  8:16am     Medication Therapy Recommendations  Diabetes mellitus, type II, insulin dependent (H)    Current Medication: tirzepatide (MOUNJARO) 10 MG/0.5ML pen (Discontinued)   Rationale: Dose too low - Dosage too low - Effectiveness   Recommendation: Increase Dose - Mounjaro 12.5 MG/0.5ML Sopn   Status: Accepted per CPA         Essential hypertension    Current Medication: lisinopril (ZESTRIL) 40 MG tablet (Discontinued)   Rationale: Dose too high - Dosage too high - Safety   Recommendation: Decrease Dose   Status: Accepted per CPA

## 2023-08-21 DIAGNOSIS — B37.31 CANDIDIASIS OF VAGINA: Primary | ICD-10-CM

## 2023-08-21 RX ORDER — FLUCONAZOLE 150 MG/1
150 TABLET ORAL ONCE
Qty: 1 TABLET | Refills: 0 | Status: SHIPPED | OUTPATIENT
Start: 2023-08-22 | End: 2023-08-22

## 2023-09-07 NOTE — PROGRESS NOTES
Medication Therapy Management (MTM) Encounter    ASSESSMENT:                            Medication Adherence/Access: No issues identified    Type 2 Diabetes:   Patient is meeting A1c goal of < 7%.  Mounjaro increase due 9/25; will send prescription to pharmacy today.      Hypertension/Dizziness:   Unable to assess without home blood pressure readings but likely meeting BP goal of <130/80.  Dizziness has resolved.     Hyperlipidemia/Myalgias:  Needs improvement.  Per previous discussion, we will wait to see what her lipid levels are off statin.  Patient is due for a return visit with Dr. Suazo at the end of October.    PLAN:                            Increase Mounjaro to 15mg daily    Follow-up: 4 weeks with myself and end of October with Dr. Suazo.    SUBJECTIVE/OBJECTIVE:                          Nichole Titus is a 73 year old female called for a follow-up visit from 8/18/23.       Reason for visit: Diabetes and Blood Pressure Follow up.    Allergies/ADRs: Reviewed in chart  Past Medical History: Reviewed in chart  Tobacco: She reports that she has never smoked. She has never used smokeless tobacco.  Social History     Tobacco Use    Smoking status: Never    Smokeless tobacco: Never   Substance Use Topics    Alcohol use: Yes     Alcohol/week: 1.7 standard drinks of alcohol     Comment: Alcoholic Drinks/day: once a week    Drug use: No       Medication Adherence/Access: no issues reported    Diabetes   Type 2 Diabetes:    Lantus 14 units daily  Mounjaro 12.5mg daily (next Monday will be 3rd dose at this dose)  Jardiance 25mg     Patient is not experiencing side effects.  Blood sugar monitoring: Continuous Glucose Monitor Typically in the 110s fasting; may bump to 210s immediately post prandial, but drops quickly; has had a few lows in 60s; Patient thinks this is related to not eating as much and exercise associated with moving  Current diabetes symptoms: none  Diet/Exercise: Walks 3-4 times daily with dog     Eye  exam in the last 12 months? No    Foot exam is up to date  Urine Albumin:   Lab Results   Component Value Date    UMALCR  01/25/2023      Comment:      Unable to calculate, urine albumin and/or urine creatinine is outside detectable limits.  Microalbuminuria is defined as an albumin:creatinine ratio of 17 to 299 for males and 25 to 299 for females. A ratio of albumin:creatinine of 300 or higher is indicative of overt proteinuria.  Due to biologic variability, positive results should be confirmed by a second, first-morning random or 24-hour timed urine specimen. If there is discrepancy, a third specimen is recommended. When 2 out of 3 results are in the microalbuminuria range, this is evidence for incipient nephropathy and warrants increased efforts at glucose control, blood pressure control, and institution of therapy with an angiotensin-converting-enzyme (ACE) inhibitor (if the patient can tolerate it).        Lab Results   Component Value Date    A1C 6.8 (H) 07/24/2023     Hypertension   Lisinopril 20mg daily - this was decreased from 40mg at last visit  Amlodipine 5mg daily  Patient reports no current medication side effects.  Patient Typically does monitor blood pressure at home but has not in the past 2 weeks because she has been moving and has not had time.  Patient states that dizziness has resolved.       BP Readings from Last 3 Encounters:   07/24/23 103/67   07/24/23 103/67   05/13/23 108/72     Pulse Readings from Last 3 Encounters:   07/24/23 60   07/24/23 60   05/13/23 68     Hyperlipidemia     no current medications  Patient reports Myalgias have resolved since stopping statin         Recent Labs   Lab Test 07/24/23  1029 12/03/16  0713   CHOL 130 197   HDL 56 65   LDL 57 116   TRIG 86 78       Today's Vitals: There were no vitals taken for this visit.  ----------------      I spent 10 minutes with this patient today. Dr. Suazo (resident physician) was provided the recommendations above  via routed  note and Dr. Castillo is the authorizing prescriber for this visit through the pharmacist collaborative practice agreement.. A copy of the visit note was provided to the patient's provider(s).    A summary of these recommendations was sent via [employer].    April Almazan PharmD     Telemedicine Visit Details  Type of service:  Telephone visit  Start Time:  8:00am  End Time:  8:10am       Medication Therapy Recommendations  Diabetes mellitus, type II, insulin dependent (H)    Current Medication: tirzepatide (MOUNJARO) 12.5 MG/0.5ML pen (Discontinued)   Rationale: Dose too low - Dosage too low - Effectiveness   Recommendation: Increase Dose   Status: Accepted per CPA

## 2023-09-08 ENCOUNTER — VIRTUAL VISIT (OUTPATIENT)
Dept: PHARMACY | Facility: CLINIC | Age: 73
End: 2023-09-08
Payer: COMMERCIAL

## 2023-09-08 DIAGNOSIS — E11.9 DIABETES MELLITUS, TYPE II, INSULIN DEPENDENT (H): Primary | ICD-10-CM

## 2023-09-08 DIAGNOSIS — I10 ESSENTIAL HYPERTENSION: ICD-10-CM

## 2023-09-08 DIAGNOSIS — E78.2 MULTIPLE-TYPE HYPERLIPIDEMIA: ICD-10-CM

## 2023-09-08 DIAGNOSIS — Z79.4 DIABETES MELLITUS, TYPE II, INSULIN DEPENDENT (H): Primary | ICD-10-CM

## 2023-09-08 PROCEDURE — 99207 PR NO CHARGE LOS: CPT | Performed by: PHARMACIST

## 2023-09-08 RX ORDER — TIRZEPATIDE 15 MG/.5ML
15 INJECTION, SOLUTION SUBCUTANEOUS
Qty: 6 ML | Refills: 3 | Status: SHIPPED | OUTPATIENT
Start: 2023-09-08 | End: 2023-11-30 | Stop reason: DRUGHIGH

## 2023-10-06 ENCOUNTER — VIRTUAL VISIT (OUTPATIENT)
Dept: PHARMACY | Facility: CLINIC | Age: 73
End: 2023-10-06
Payer: COMMERCIAL

## 2023-10-06 DIAGNOSIS — E78.2 MULTIPLE-TYPE HYPERLIPIDEMIA: ICD-10-CM

## 2023-10-06 DIAGNOSIS — I10 ESSENTIAL HYPERTENSION: ICD-10-CM

## 2023-10-06 DIAGNOSIS — Z79.4 DIABETES MELLITUS, TYPE II, INSULIN DEPENDENT (H): Primary | ICD-10-CM

## 2023-10-06 DIAGNOSIS — E11.9 DIABETES MELLITUS, TYPE II, INSULIN DEPENDENT (H): Primary | ICD-10-CM

## 2023-10-06 PROCEDURE — 99207 PR NO CHARGE LOS: CPT | Performed by: PHARMACIST

## 2023-10-06 NOTE — PROGRESS NOTES
Medication Therapy Management (MTM) Encounter    ASSESSMENT:                            Medication Adherence/Access: No issues identified    Type 2 Diabetes:   Meeting HbA1c goal of < 7%.  On max dose of Jardiance and Mounjaro. I am curious if patient can stop Lantus at this point.     Hypertension:  Appears to be meeting goal of <130/80.  Patient is having symptomatic hypotension.  Patient is currently on 2 antihypertensives.  Although she does not have microalbuminuria she can still benefit from the kidney-protective effects of lisinopril.     Hyperlipidemia:  Stable.  Will assess liipids at next visit with Dr Suazo. .      PLAN:                            Stop Lantus - see how your blood sugars do off of this medication  Stop amlodipine     Follow-up: 2 weeks with me, 3 weeks with Dr Suazo    SUBJECTIVE/OBJECTIVE:                          Nichole Titus is a 73 year old female called for a follow-up visit from 9/8/23.       Reason for visit: Diabetes, blood pressure.    Allergies/ADRs: Reviewed in chart  Past Medical History: Reviewed in chart  Tobacco: She reports that she has never smoked. She has never used smokeless tobacco.  Social History     Tobacco Use    Smoking status: Never    Smokeless tobacco: Never   Substance Use Topics    Alcohol use: Yes     Alcohol/week: 1.7 standard drinks of alcohol     Comment: Alcoholic Drinks/day: once a week    Drug use: No       Medication Adherence/Access: no issues reported    Diabetes Type 2 Diabetes:    Lantus 14 units daily  Mounjaro 15mg week (increased on 9/25) - things are going well   Jardiance 25mg     Patient is not experiencing side effects.  Blood sugar monitoring: Continuous Glucose Monitor Typically in the 110s fasting; may bump to 210s immediately post prandial. High was 242.   Current diabetes symptoms: none  Diet/Exercise: Walks 3-4 times daily with dog       Eye exam is up to date  Foot exam is up to date  Urine Albumin:   Lab Results   Component Value  Date    UMALCR  01/25/2023      Comment:      Unable to calculate, urine albumin and/or urine creatinine is outside detectable limits.  Microalbuminuria is defined as an albumin:creatinine ratio of 17 to 299 for males and 25 to 299 for females. A ratio of albumin:creatinine of 300 or higher is indicative of overt proteinuria.  Due to biologic variability, positive results should be confirmed by a second, first-morning random or 24-hour timed urine specimen. If there is discrepancy, a third specimen is recommended. When 2 out of 3 results are in the microalbuminuria range, this is evidence for incipient nephropathy and warrants increased efforts at glucose control, blood pressure control, and institution of therapy with an angiotensin-converting-enzyme (ACE) inhibitor (if the patient can tolerate it).        Lab Results   Component Value Date    A1C 6.8 (H) 07/24/2023     Hypertension   Lisinopril 20mg daily - this was decreased from 40mg 8/18  Amlodipine 5mg daily  Patient reports no current medication side effects.  Has been checking blood pressure at home.  Typically in the 90s - 103 systolic. Does get some diizziness and lightheadedness. Had an incident where she felt her blood pressure was low while driving and have to pull over and have her granddaughter drive.      BP Readings from Last 3 Encounters:   07/24/23 103/67   07/24/23 103/67   05/13/23 108/72     Pulse Readings from Last 3 Encounters:   07/24/23 60   07/24/23 60   05/13/23 68     Hyperlipidemia no current medications  Patient reports Myalgias have resolved since stopping statin       Recent Labs   Lab Test 07/24/23  1029 12/03/16  0713   CHOL 130 197   HDL 56 65   LDL 57 116   TRIG 86 78       Today's Vitals: There were no vitals taken for this visit.  ----------------      I spent 15 minutes with this patient today. Dr. Suazo (resident physician) was provided the recommendations above  in clinic today and Dr. Castillo is the authorizing prescriber  for this visit through the pharmacist collaborative practice agreement.. A copy of the visit note was provided to the patient's provider(s).    A summary of these recommendations was sent via [employer].    April Almazan PharmD     Telemedicine Visit Details  Type of service:  Telephone visit  Start Time:  8:02am   End Time:  8:17am       Medication Therapy Recommendations  No medication therapy recommendations to display

## 2023-10-19 NOTE — PROGRESS NOTES
Medication Therapy Management (MTM) Encounter    ASSESSMENT:                            Medication Adherence/Access: No issues identified    Type 2 Diabetes:   Patient is meeting A1c goal of < 7%.  I am ok with the temporary use of Lantus secondary to cortisone shot.     Hypertension/Dizziness:   Patient is not meeting a BP goal of <130/80, but dizziness is resolved.  We stopped amlodipine at the last visit, patient could benefit from restarting at a lower dose.     Hyperlipidemia/Myalgias:  Needs improvement.  Will get a lipid panel at the visit next week to assess cholesterol off statin.     PLAN:                            Restart amlodipine at 2.5mg daily     Follow-up: 1 week with myself and Dr Suazo.     SUBJECTIVE/OBJECTIVE:                          Nichole Titus is a 73 year old female called for a follow-up visit from 10/6/23.       Reason for visit: Diabetes follow up.    Allergies/ADRs: Reviewed in chart  Past Medical History: Reviewed in chart  Tobacco: She reports that she has never smoked. She has never used smokeless tobacco.  Social History     Tobacco Use    Smoking status: Never    Smokeless tobacco: Never   Substance Use Topics    Alcohol use: Yes     Alcohol/week: 1.7 standard drinks of alcohol     Comment: Alcoholic Drinks/day: once a week    Drug use: No       Medication Adherence/Access: no issues reported    Diabetes Type 2 Diabetes:    Mounjaro 15mg week (increased on 9/25)   Jardiance 25mg   Lantus stopped at last visit (10/6), however, she received a cortisone shot Wednesday (hand), which caused her sugars to go up, so she used some Lantus last night and will probably do so again today.     Patient is not experiencing side effects.  Blood sugar monitoring: Continuous Glucose Monitor Typically in the 150-170s fasting; may bump to 210s immediately post prandial. High was 240s; Typically, 150 - 170 right before bed.    Current diabetes symptoms: none  Diet/Exercise: Walks 3-4 times daily with  dog       Eye exam is up to date  Foot exam is up to date  Urine Albumin:   Lab Results   Component Value Date    UMALCR  01/25/2023      Comment:      Unable to calculate, urine albumin and/or urine creatinine is outside detectable limits.  Microalbuminuria is defined as an albumin:creatinine ratio of 17 to 299 for males and 25 to 299 for females. A ratio of albumin:creatinine of 300 or higher is indicative of overt proteinuria.  Due to biologic variability, positive results should be confirmed by a second, first-morning random or 24-hour timed urine specimen. If there is discrepancy, a third specimen is recommended. When 2 out of 3 results are in the microalbuminuria range, this is evidence for incipient nephropathy and warrants increased efforts at glucose control, blood pressure control, and institution of therapy with an angiotensin-converting-enzyme (ACE) inhibitor (if the patient can tolerate it).        Lab Results   Component Value Date    A1C 6.8 (H) 07/24/2023     Hypertension   Lisinopril 20mg daily - this was decreased from 40mg 8/18  Amlodipine 5mg - stopped at last visit (10/6)  Patient reports no current medication side effects.  Has been checking blood pressure at home.      Now typically in the 130s systolic. No lightheadedness.       BP Readings from Last 3 Encounters:   07/24/23 103/67   07/24/23 103/67   05/13/23 108/72     Pulse Readings from Last 3 Encounters:   07/24/23 60   07/24/23 60   05/13/23 68     Hyperlipidemia no current medications  Patient reports Myalgias have resolved since stopping statin       Recent Labs   Lab Test 07/24/23  1029 12/03/16  0713   CHOL 130 197   HDL 56 65   LDL 57 116   TRIG 86 78       Today's Vitals: There were no vitals taken for this visit.  ----------------      I spent 10 minutes with this patient today. Dr. Suazo (resident physician) was provided the recommendations above  via routed note and Dr. Arnold is the authorizing prescriber for this visit  through the pharmacist collaborative practice agreement.. A copy of the visit note was provided to the patient's provider(s).    A summary of these recommendations was declined by the patient.    April Almazan PharmD     Telemedicine Visit Details  Type of service:  Telephone visit  Start Time:  9:30am  End Time: 9:40 AM       Medication Therapy Recommendations  No medication therapy recommendations to display

## 2023-10-20 ENCOUNTER — TELEPHONE (OUTPATIENT)
Dept: PHARMACY | Facility: CLINIC | Age: 73
End: 2023-10-20
Payer: COMMERCIAL

## 2023-10-20 ENCOUNTER — VIRTUAL VISIT (OUTPATIENT)
Dept: PHARMACY | Facility: CLINIC | Age: 73
End: 2023-10-20
Payer: COMMERCIAL

## 2023-10-20 DIAGNOSIS — E78.2 MULTIPLE-TYPE HYPERLIPIDEMIA: ICD-10-CM

## 2023-10-20 DIAGNOSIS — E11.9 DIABETES MELLITUS, TYPE II, INSULIN DEPENDENT (H): ICD-10-CM

## 2023-10-20 DIAGNOSIS — I10 ESSENTIAL HYPERTENSION: Primary | ICD-10-CM

## 2023-10-20 DIAGNOSIS — Z79.4 DIABETES MELLITUS, TYPE II, INSULIN DEPENDENT (H): ICD-10-CM

## 2023-10-20 PROCEDURE — 99207 PR NO CHARGE LOS: CPT | Performed by: PHARMACIST

## 2023-10-20 RX ORDER — AMLODIPINE BESYLATE 2.5 MG/1
2.5 TABLET ORAL DAILY
Qty: 90 TABLET | Refills: 3 | Status: SHIPPED | OUTPATIENT
Start: 2023-10-20 | End: 2024-05-17

## 2023-10-20 NOTE — TELEPHONE ENCOUNTER
"Attempted to call patient at 8:10 for appointment and got message \"call could not be completed at this time\".  X 2.  Will attempt again later.  April Almazan, PharmD   "

## 2023-10-27 ENCOUNTER — OFFICE VISIT (OUTPATIENT)
Dept: FAMILY MEDICINE | Facility: CLINIC | Age: 73
End: 2023-10-27
Payer: COMMERCIAL

## 2023-10-27 ENCOUNTER — OFFICE VISIT (OUTPATIENT)
Dept: PHARMACY | Facility: CLINIC | Age: 73
End: 2023-10-27
Payer: COMMERCIAL

## 2023-10-27 VITALS
HEART RATE: 65 BPM | OXYGEN SATURATION: 98 % | BODY MASS INDEX: 22.24 KG/M2 | RESPIRATION RATE: 18 BRPM | DIASTOLIC BLOOD PRESSURE: 73 MMHG | TEMPERATURE: 95.7 F | SYSTOLIC BLOOD PRESSURE: 116 MMHG | WEIGHT: 115 LBS

## 2023-10-27 DIAGNOSIS — B37.31 CANDIDIASIS OF VAGINA: ICD-10-CM

## 2023-10-27 DIAGNOSIS — F33.42 RECURRENT MAJOR DEPRESSION IN FULL REMISSION (H): ICD-10-CM

## 2023-10-27 DIAGNOSIS — E11.22 TYPE 2 DIABETES MELLITUS WITH STAGE 3B CHRONIC KIDNEY DISEASE, WITH LONG-TERM CURRENT USE OF INSULIN (H): Primary | ICD-10-CM

## 2023-10-27 DIAGNOSIS — B37.31 RECURRENT CANDIDIASIS OF VAGINA: ICD-10-CM

## 2023-10-27 DIAGNOSIS — Z79.4 TYPE 2 DIABETES MELLITUS WITH STAGE 3B CHRONIC KIDNEY DISEASE, WITH LONG-TERM CURRENT USE OF INSULIN (H): Primary | ICD-10-CM

## 2023-10-27 DIAGNOSIS — N18.32 TYPE 2 DIABETES MELLITUS WITH STAGE 3B CHRONIC KIDNEY DISEASE, WITH LONG-TERM CURRENT USE OF INSULIN (H): Primary | ICD-10-CM

## 2023-10-27 DIAGNOSIS — Z79.4 DIABETES MELLITUS, TYPE II, INSULIN DEPENDENT (H): ICD-10-CM

## 2023-10-27 DIAGNOSIS — E78.2 MULTIPLE-TYPE HYPERLIPIDEMIA: ICD-10-CM

## 2023-10-27 DIAGNOSIS — I10 ESSENTIAL HYPERTENSION: Primary | ICD-10-CM

## 2023-10-27 DIAGNOSIS — E11.9 DIABETES MELLITUS, TYPE II, INSULIN DEPENDENT (H): ICD-10-CM

## 2023-10-27 DIAGNOSIS — I25.119 ATHEROSCLEROSIS OF NATIVE CORONARY ARTERY OF NATIVE HEART WITH ANGINA PECTORIS (H): ICD-10-CM

## 2023-10-27 PROBLEM — B00.9 HERPES SIMPLEX TYPE 2 INFECTION: Status: ACTIVE | Noted: 2021-05-22

## 2023-10-27 PROBLEM — Z95.2 S/P AORTIC VALVE REPLACEMENT: Status: ACTIVE | Noted: 2018-05-01

## 2023-10-27 LAB
CHOLEST SERPL-MCNC: 232 MG/DL
HBA1C MFR BLD: 7.3 % (ref 0–5.6)
HDLC SERPL-MCNC: 66 MG/DL
LDLC SERPL CALC-MCNC: 147 MG/DL
NONHDLC SERPL-MCNC: 166 MG/DL
TRIGL SERPL-MCNC: 97 MG/DL

## 2023-10-27 PROCEDURE — 83036 HEMOGLOBIN GLYCOSYLATED A1C: CPT

## 2023-10-27 PROCEDURE — 99207 PR NO CHARGE LOS: CPT | Performed by: PHARMACIST

## 2023-10-27 PROCEDURE — 99214 OFFICE O/P EST MOD 30 MIN: CPT | Mod: GC

## 2023-10-27 PROCEDURE — 36415 COLL VENOUS BLD VENIPUNCTURE: CPT

## 2023-10-27 PROCEDURE — 80061 LIPID PANEL: CPT

## 2023-10-27 RX ORDER — FLUCONAZOLE 150 MG/1
150 TABLET ORAL ONCE
Qty: 1 TABLET | Refills: 11 | Status: SHIPPED | OUTPATIENT
Start: 2023-10-27 | End: 2023-10-27

## 2023-10-27 RX ORDER — ROSUVASTATIN CALCIUM 5 MG/1
5 TABLET, COATED ORAL DAILY
Qty: 90 TABLET | Refills: 3 | Status: SHIPPED | OUTPATIENT
Start: 2023-10-27 | End: 2024-01-03

## 2023-10-27 RX ORDER — FLUCONAZOLE 150 MG/1
TABLET ORAL
COMMUNITY
Start: 2023-08-22 | End: 2024-02-16

## 2023-10-27 RX ORDER — ROSUVASTATIN CALCIUM 5 MG/1
5 TABLET, COATED ORAL DAILY
Qty: 30 TABLET | Refills: 11 | Status: SHIPPED | OUTPATIENT
Start: 2023-10-27 | End: 2023-10-27

## 2023-10-27 NOTE — PROGRESS NOTES
Medication Therapy Management (MTM) Encounter    ASSESSMENT:                            Medication Adherence/Access: No issues identified    Type 2 Diabetes CKD3b, with long-term current use of insulin: Needs Improvement   A1C increased from 6.8% to 7.3%. However, this could be due to discontinuation of Lantus and steroid injection. Patient has a history of hospitalization due to hypoglycemia, so concerned about her using too much insulin causing over night hypoglycemia. Keeping Lantus only as needed for blood glucose >250 mg/dL will help control control blood glucose and reduce incidence of overnight hypoglycemia.    Coronary Artery Atherosclerosis/Hyperlipidemia: Needs Improvement  Hyperlipidemia controlled without medication therapy. However, with 10-Year ASCVD Risk of 21.6%, history of coronary artery atherosclerosis, and co morbidities of diabetes and hypertension, statin therapy is recommended. Addition of a statin can help prevent new cardiovascular events.    Hypertension: Controlled on lisinopril 20 mg and amlodipine 2.5 mg    Recurrent Vaginal Candidiasis: Controlled on fluconazole 150 mg    PLAN:                            START taking rosuvastatin 5 mg once daily  If blood glucose >250 mg/dL, inject Lantus 10 units  Labs: A1c, Lipid Panel    Follow-up: with PharmD on 12/1/23 at 8:00 AM and make appointment with PCP in 3 months    SUBJECTIVE/OBJECTIVE:                          Nichole Titus is a 73 year old female coming in for a follow-up visit from Logan Suazo DO.     Reason for visit: MTM Follow up    Allergies/ADRs: Reviewed in chart  Past Medical History: Reviewed in chart  Tobacco: She reports that she has never smoked. She has never used smokeless tobacco.    Medication Adherence/Access: no issues reported    Diabetes Type 2 Diabetes CKD3b, with long-term current use of insulin:    Mounjaro 15mg once week (increased on 9/25)   Jardiance 25mg   Lantus stopped 10/6, however, she received a cortisone  shot in her hand (10/18/23), which caused her sugars to go up, so she has been using Lantus as needed when her blood sugar spike really high at night. Last night her glucose was 310 around 10 PM.  She gave herself 14 units of Lantus and this morning her sugar was down to 170.    - PMH: Jan 2023 hospitalized and was hypoglycemic and had a subsequent seizure; metformin (diarrhea)       Patient is not experiencing side effects.  Blood sugar monitoring: Continuous Glucose Monitor Typically in the 150-170s fasting; may bump to 210s immediately post prandial. High was 240s; Typically, 150 - 170 right before bed.    Current diabetes symptoms: none  Diet/Exercise: Walks 3-4 times daily with dog       Eye exam is up to date  Foot exam is up to date  Urine Albumin:   Lab Results   Component Value Date    UMALCR  01/25/2023      Comment:      Unable to calculate, urine albumin and/or urine creatinine is outside detectable limits.  Microalbuminuria is defined as an albumin:creatinine ratio of 17 to 299 for males and 25 to 299 for females. A ratio of albumin:creatinine of 300 or higher is indicative of overt proteinuria.  Due to biologic variability, positive results should be confirmed by a second, first-morning random or 24-hour timed urine specimen. If there is discrepancy, a third specimen is recommended. When 2 out of 3 results are in the microalbuminuria range, this is evidence for incipient nephropathy and warrants increased efforts at glucose control, blood pressure control, and institution of therapy with an angiotensin-converting-enzyme (ACE) inhibitor (if the patient can tolerate it).        Lab Results   Component Value Date    A1C 7.3 (H) 10/27/2023     Hemoglobin A1C   Date Value Ref Range Status   10/27/2023 7.3 (H) 0.0 - 5.6 % Final     Comment:     Normal <5.7%   Prediabetes 5.7-6.4%    Diabetes 6.5% or higher     Note: Adopted from ADA consensus guidelines.   07/24/2023 6.8 (H) 0.0 - 5.6 % Final     Comment:     " Normal <5.7%   Prediabetes 5.7-6.4%    Diabetes 6.5% or higher     Note: Adopted from ADA consensus guidelines.   04/13/2023 7.8 (H) 0.0 - 5.6 % Final     Comment:     Normal <5.7%   Prediabetes 5.7-6.4%    Diabetes 6.5% or higher     Note: Adopted from ADA consensus guidelines.      Hypertension   Amlodipine 2.5 mg - restarted at lower dose at last visit (10/20/23, amlodipine 5 mg was stopped 10/6/23 due to hypotension)  Lisinopril 20mg daily - this was decreased from 40mg 8/18/23  Patient reports no current medication side effects.  Has been checking blood pressure at home.    Now typically in the 130s systolic. No lightheadedness.       BP Readings from Last 3 Encounters:   10/27/23 116/73   07/24/23 103/67   07/24/23 103/67     Pulse Readings from Last 3 Encounters:   10/27/23 65   07/24/23 60   07/24/23 60     Coronary Artery Atherosclerosis/Hyperlipidemia no current medications  Stopped atorvastatin about 2 months ago due to adverse effects and reports myalgias have resolved since stopping statin. 10-Year ASCVD Risk: 21.6% (Per [employer] College [employer] ASCVD Risk  https://tools.acc.org/ldl/ascvd_risk_estimator/index.html#!/calulate//)        Recent Labs   Lab Test 07/24/23  1029 12/03/16  0713   CHOL 130 197   HDL 56 65   LDL 57 116   TRIG 86 78     Recurrent Vaginal Candidiasis  Fluconazole 150 mg  Patient reports recurrent yeast infections and reported she took one tablet of fluconazole yesterday. Recurrent vaginal candidiasis likely caused by Jardiance.    Today's Vitals:   BP Readings from Last 1 Encounters:   10/27/23 116/73     Pulse Readings from Last 1 Encounters:   10/27/23 65     Wt Readings from Last 1 Encounters:   10/27/23 115 lb (52.2 kg)     Ht Readings from Last 1 Encounters:   01/25/23 5' 0.3\" (1.532 m)     Estimated body mass index is 22.24 kg/m  as calculated from the following:    Height as of 1/25/23: 5' 0.3\" (1.532 m).    Weight as of an earlier encounter on " 10/27/23: 115 lb (52.2 kg).    Temp Readings from Last 1 Encounters:   10/27/23 (!) 95.7  F (35.4  C) (Tympanic)     ----------------    I spent 20 minutes with this patient today (an extra 15 minutes was spent creating the Medication Action Plan). All changes were made via collaborative practice agreement with Logan Suazo DO. A copy of the visit note was provided to the patient's provider(s).    A summary of these recommendations was given to the patient.    Tamra Sousa, PharmD Candidate    I was present with the pharmacy student who participated in the service and in the documentation of this note. I have verified the history, personally performed the medical decision making, and have verified the content of the note, which accurately reflects my assessment of the patient and the plan of care.   April Almazan, [facility], PharmD      Medication Therapy Recommendations  Hyperlipemia, mixed    Rationale: Untreated condition - Needs additional medication therapy - Indication   Recommendation: Start Medication - rosuvastatin 5 MG tablet   Status: Accepted per Provider         Type 2 diabetes mellitus with stage 3b chronic kidney disease, with long-term current use of insulin (H)    Current Medication: insulin glargine (LANTUS PEN) 100 UNIT/ML pen   Rationale: Frequency inappropriate - Dosage too high - Safety   Recommendation: Decrease Frequency   Status: Accepted per Provider

## 2023-10-27 NOTE — PROGRESS NOTES
Preceptor Attestation:    I discussed the patient with the resident and evaluated the patient in person. I have verified the content of the note, which accurately reflects my assessment of the patient and the plan of care.   Supervising Physician:  Tommy Madsen MD.

## 2023-10-27 NOTE — Clinical Note
FYI only - pharmacy department requires that I route this note to you.  We discussed in person today.  lashell

## 2023-10-27 NOTE — PROGRESS NOTES
Assessment & Plan     Type 2 diabetes mellitus with stage 3b chronic kidney disease, with long-term current use of insulin (H)  Patient presents today for diabetes follow-up.  Currently taking her Mounjaro and Jardiance as prescribed.  Has been using her Lantus as needed when her blood sugar gets really high at night.  She received a steroid injection about 1 week ago and last night her glucose was 310 at around 10 PM.  She gave herself 14 units of Lantus and this morning her sugar was down to 170.  Will have pharmacy set parameters for when she should use her Lantus.  Patient has a history of going low causing hospitalization, so we are nervous about her using too much insulin.  - Hemoglobin A1c  - Continue Mounjaro and Jardiance  - Insulin per pharmacy recs    Recurrent major depression in full remission (H24)  Patient is not experiencing any depressive symptoms today.    Atherosclerosis of native coronary artery of native heart with angina pectoris (H24)  Patient has a history of atherosclerosis of coronary artery and stopped her atorvastatin about 2 months ago due to adverse effects.  We will restart with the low-dose rosuvastatin as it has a milder side effect profile.  - Lipid Profile  - rosuvastatin (CRESTOR) 5 MG tablet  Dispense: 30 tablet; Refill: 11    Recurrent candidiasis of vagina  Patient reports recurrent yeast infections.  States she had a take Diflucan yesterday.  This is likely due to her Jardiance.  We will prescribe fluconazole with refills that she can have at home.  - fluconazole (DIFLUCAN) 150 MG tablet  Dispense: 1 tablet; Refill: 11    Return in about 3 months (around 1/27/2024) for Follow up, with me.    Logan Suazo [facility]    Augustin Varela is a 73 year old, presenting for the following health issues:  Diabetes      10/27/2023     8:46 AM   Additional Questions   Roomed by Sissy       Patient presents today for diabetes follow-up.  She is very happy  with the control of her diabetes, however, she received a steroid injection in her hand last week and noticed a spike in her sugars.  Last night her sugar was 310 and then around 10 PM so she gave herself 14 units of Lantus.  This morning it had decreased to 170.  She is using the Lantus just as needed when her blood sugar gets very high.  Patient is also currently under management for her blood pressure.  Blood pressure has been great with the amlodipine and lisinopril.  Patient stopped her atorvastatin about 2 months ago due to adverse effects and states she has felt much better since stopping it.       Review of Systems   All other systems reviewed and are negative.         Objective    /73   Pulse 65   Temp (!) 95.7  F (35.4  C) (Tympanic)   Resp 18   Wt 52.2 kg (115 lb)   SpO2 98%   BMI 22.24 kg/m    Body mass index is 22.24 kg/m .  Physical Exam  Vitals reviewed.   Constitutional:       Appearance: Normal appearance.   HENT:      Head: Normocephalic and atraumatic.   Eyes:      Extraocular Movements: Extraocular movements intact.      Conjunctiva/sclera: Conjunctivae normal.   Pulmonary:      Effort: Pulmonary effort is normal.   Neurological:      Mental Status: She is alert and oriented to person, place, and time.   Psychiatric:         Mood and Affect: Mood normal.         Behavior: Behavior normal.         Thought Content: Thought content normal.         Judgment: Judgment normal.

## 2023-10-27 NOTE — PATIENT INSTRUCTIONS
Thank you for coming in to see us today!    - Continue taking all your diabetes medications as instructed. Use your insulin per pharmacy's recommendations  - Continue taking your blood pressure medications as they are currently prescribed  - Take the new statin medication (rosuvastatin) every day  - Follow up in 3 months for DM check    Logan Suazo,

## 2023-10-30 ENCOUNTER — TELEPHONE (OUTPATIENT)
Dept: FAMILY MEDICINE | Facility: CLINIC | Age: 73
End: 2023-10-30
Payer: COMMERCIAL

## 2023-10-30 NOTE — TELEPHONE ENCOUNTER
[facility] phone call message- general phone call:    Reason for call:     Patient missed a phone call from Dr. Suazo about 10-15 mins ago and would like Dr. Suazo to return her phone call.    Return call needed: Yes    OK to leave a message on voice mail? Yes    Primary language: English      needed? No    Call taken on October 30, 2023 at 9:37 AM by Jaci Arreola

## 2023-11-20 ENCOUNTER — TELEPHONE (OUTPATIENT)
Dept: FAMILY MEDICINE | Facility: CLINIC | Age: 73
End: 2023-11-20
Payer: COMMERCIAL

## 2023-11-20 NOTE — TELEPHONE ENCOUNTER
[facility] phone call message- general phone call:    Reason for call: pt is calling and requesting a form be filled out for her landlord.  The landlord said they faxed it over a couple of time.  It is for her cat to also be an emotional support animal.     Return call needed: Yes    OK to leave a message on voice mail? Yes    Primary language: English      needed? No    Call taken on November 20, 2023 at 1:45 PM by Caden Rubi

## 2023-11-20 NOTE — TELEPHONE ENCOUNTER
Called pt to let her know that Dr Suazo has not received the form yet and gave her the fax number [phone] to give to the [facility] and Dr Jacobs will complete when he receives it./Margaux Wade RN BSN

## 2023-11-30 ENCOUNTER — TELEPHONE (OUTPATIENT)
Dept: PHARMACY | Facility: CLINIC | Age: 73
End: 2023-11-30
Payer: COMMERCIAL

## 2023-11-30 DIAGNOSIS — E11.9 DIABETES MELLITUS, TYPE II, INSULIN DEPENDENT (H): Primary | ICD-10-CM

## 2023-11-30 DIAGNOSIS — R11.0 NAUSEA: ICD-10-CM

## 2023-11-30 DIAGNOSIS — Z79.4 DIABETES MELLITUS, TYPE II, INSULIN DEPENDENT (H): Primary | ICD-10-CM

## 2023-11-30 RX ORDER — TIRZEPATIDE 10 MG/.5ML
10 INJECTION, SOLUTION SUBCUTANEOUS
Qty: 2 ML | Refills: 3 | Status: SHIPPED | OUTPATIENT
Start: 2023-11-30 | End: 2024-01-03

## 2023-11-30 RX ORDER — ONDANSETRON 4 MG/1
4 TABLET, FILM COATED ORAL EVERY 8 HOURS PRN
Qty: 20 TABLET | Refills: 1 | Status: SHIPPED | OUTPATIENT
Start: 2023-11-30 | End: 2023-12-15

## 2023-11-30 NOTE — TELEPHONE ENCOUNTER
Patient is calling because she is currently ill and also feels that she has lost too much weight on Mounjaro.     Illness: This started yesterday with a runny nose.  Today it has expanded into watery eyes, chills and a gagging feeling with vomiting.  She denies fever and diarrhea.     Weight lost: patient states that she is down to 100 pounds with a very low appetite.  She worries that this is too low.  A BMI calculation shows that this is around 19.     Discussed with Dr Suazo as he is in clinic.  Decided to back off to 10mg Mounjaro dose and prescribe ondansetron to take as needed for nausea.  I will follow up with patient tomorrow for our previously scheduled MTM.    April Almazan, PharmD

## 2023-12-01 ENCOUNTER — VIRTUAL VISIT (OUTPATIENT)
Dept: PHARMACY | Facility: CLINIC | Age: 73
End: 2023-12-01
Payer: COMMERCIAL

## 2023-12-01 DIAGNOSIS — E11.9 DIABETES MELLITUS, TYPE II, INSULIN DEPENDENT (H): Primary | ICD-10-CM

## 2023-12-01 DIAGNOSIS — N18.30 STAGE 3 CHRONIC KIDNEY DISEASE, UNSPECIFIED WHETHER STAGE 3A OR 3B CKD (H): ICD-10-CM

## 2023-12-01 DIAGNOSIS — I35.0 AORTIC STENOSIS, MODERATE: Chronic | ICD-10-CM

## 2023-12-01 DIAGNOSIS — E78.2 HYPERLIPEMIA, MIXED: ICD-10-CM

## 2023-12-01 DIAGNOSIS — I10 ESSENTIAL HYPERTENSION: ICD-10-CM

## 2023-12-01 DIAGNOSIS — Z79.4 DIABETES MELLITUS, TYPE II, INSULIN DEPENDENT (H): Primary | ICD-10-CM

## 2023-12-01 PROCEDURE — 99207 PR NO CHARGE LOS: CPT | Performed by: PHARMACIST

## 2023-12-01 NOTE — PROGRESS NOTES
Medication Therapy Management (MTM) Encounter    ASSESSMENT:                            Medication Adherence/Access: No issues identified    Current illness:   Continued to encourage supportive care.      Type 2 Diabetes CKD3b, with long-term current use of insulin: Stable  Not meeting A1c goal of <7%; is meeting A1c goal of <8%  Per phone note yesterday, we decreased Mounjaro to 10mg weekly secondary to weight loss and nausea, so ultimately, I want to see how that plays out before making any changes to her regimen.  I do wonder if the next step is to try a medication like glipizide or pioglitazone to get her average glucose down a little.      Hypertension: Stable. At BP goal of <130/80.  I think episode Wednesday of hypotension was likely due to her illness, but will continue to monitor.     Coronary Artery Atherosclerosis/Hyperlipidemia:  Patient is now on statin therapy, but may be having an adverse reaction.  Patient is willing to wait this out for a while so will continue to monitor.       PLAN:                            Continue current medications    Follow-up: 1 week for flu and COVID     SUBJECTIVE/OBJECTIVE:                          Nichole Titus is a 73 year old female called for a follow-up visit from 10/27/23.       Reason for visit: MTM follow up.    Allergies/ADRs: Reviewed in chart  Past Medical History: Reviewed in chart  Tobacco: She reports that she has never smoked. She has never used smokeless tobacco.  Social History     Tobacco Use    Smoking status: Never    Smokeless tobacco: Never   Substance Use Topics    Alcohol use: Yes     Alcohol/week: 1.7 standard drinks of alcohol     Comment: Alcoholic Drinks/day: once a week    Drug use: No       Medication Adherence/Access: no issues reported    Current illness:   Watery eyes, runny nose. No cough. Starting to lose voice. Wasn't able to get to the pharmacy yesterday to  the ondansetron but will get there today.  Patient reports still having  some nausea.     Diabetes Type 2 Diabetes CKD3b, with long-term current use of insulin:    Mounjaro 10mg once week (decreased yesterday - 11/30)   Jardiance 25mg   Lantus 10 units if blood glucose >250  Lantus stopped 10/6, and then restarted with parameters above as she was kind of giving herself Lantus sporadically for high blood sugars.  Patient states that she really has not had to use Lantus since the last visit.        - PMH: Jan 2023 hospitalized and was hypoglycemic and had a subsequent seizure; metformin (diarrhea)     Patient is not experiencing side effects.  Blood sugar monitoring: Continuous Glucose Monitor   Lowest 110. Right now it is 180; has not had breakfast. Typically in the 150-170s fasting; may bump to 210s immediately post prandial. Highest was 280s; Typically, 150 - 170 right before bed. We are working on getting her information shared with the clinic.    Current diabetes symptoms: none  Diet/Exercise: Walks 3-4 times daily with dog     Eye exam is up to date  Foot exam is up to date  Urine Albumin:   Lab Results   Component Value Date    UMALCR  01/25/2023      Comment:      Unable to calculate, urine albumin and/or urine creatinine is outside detectable limits.  Microalbuminuria is defined as an albumin:creatinine ratio of 17 to 299 for males and 25 to 299 for females. A ratio of albumin:creatinine of 300 or higher is indicative of overt proteinuria.  Due to biologic variability, positive results should be confirmed by a second, first-morning random or 24-hour timed urine specimen. If there is discrepancy, a third specimen is recommended. When 2 out of 3 results are in the microalbuminuria range, this is evidence for incipient nephropathy and warrants increased efforts at glucose control, blood pressure control, and institution of therapy with an angiotensin-converting-enzyme (ACE) inhibitor (if the patient can tolerate it).        Lab Results   Component Value Date    A1C 7.3 (H) 10/27/2023      Hypertension   Amlodipine 2.5 mg - restarted at lower dose (10/20/23, amlodipine 5 mg was stopped 10/6/23 due to hypotension)  Lisinopril 20mg daily - this was decreased from 40mg 8/18/23  Patient reports no current medication side effects.  Has been checking blood pressure at home, but has not been writing them down.   Was with her daughter Wednesday (the first day she started to feel ill) and had an episode of blurry vision that lasted about 10-15 minutes. She had to sit down. When she returned home BP was 110/72.   Prior to getting sick - 120s systolic.        BP Readings from Last 3 Encounters:   10/27/23 116/73   07/24/23 103/67   07/24/23 103/67     Pulse Readings from Last 3 Encounters:   10/27/23 65   07/24/23 60   07/24/23 60     Hyperlipidemia   Restarted rosuvastatin 5mg at last visit    Stopped atorvastatin in July due to myalgias and leg cramps.  Rosuvastatin started at last visit (10/27/23) for secondary prevention (AAA) and due to high LDL.  Patient reports starting to get leg cramps similar to the atorvastatin. More the left side than the right side.  She is willing to give this more time though.       Recent Labs   Lab Test 10/27/23  0940 07/24/23  1029   CHOL 232* 130   HDL 66 56   * 57   TRIG 97 86       Today's Vitals: There were no vitals taken for this visit.  ----------------      I spent 20 minutes with this patient today. Dr. Jacobs (resident physician) was provided the recommendations above  via routed note and Dr. Hewitt is the authorizing prescriber for this visit through the pharmacist collaborative practice agreement.. A copy of the visit note was provided to the patient's provider(s).    A summary of these recommendations was declined by the patient.    April Almazan PharmD     Telemedicine Visit Details  Type of service:  Telephone visit  Start Time:  8:02am  End Time: 8:22 AM     Medication Therapy Recommendations  No medication therapy recommendations to display

## 2023-12-03 ENCOUNTER — OFFICE VISIT (OUTPATIENT)
Dept: FAMILY MEDICINE | Facility: CLINIC | Age: 73
End: 2023-12-03
Payer: COMMERCIAL

## 2023-12-03 VITALS
HEART RATE: 82 BPM | OXYGEN SATURATION: 98 % | DIASTOLIC BLOOD PRESSURE: 72 MMHG | SYSTOLIC BLOOD PRESSURE: 114 MMHG | BODY MASS INDEX: 21.46 KG/M2 | TEMPERATURE: 97.8 F | WEIGHT: 111 LBS

## 2023-12-03 DIAGNOSIS — N89.8 ITCHING IN THE VAGINAL AREA: Primary | ICD-10-CM

## 2023-12-03 LAB
ALBUMIN UR-MCNC: NEGATIVE MG/DL
APPEARANCE UR: CLEAR
BACTERIA #/AREA URNS HPF: ABNORMAL /HPF
BILIRUB UR QL STRIP: NEGATIVE
CLUE CELLS: ABNORMAL
COLOR UR AUTO: YELLOW
GLUCOSE UR STRIP-MCNC: >=1000 MG/DL
HGB UR QL STRIP: ABNORMAL
KETONES UR STRIP-MCNC: 80 MG/DL
LEUKOCYTE ESTERASE UR QL STRIP: ABNORMAL
NITRATE UR QL: NEGATIVE
PH UR STRIP: 5 [PH] (ref 5–8)
RBC #/AREA URNS AUTO: ABNORMAL /HPF
SP GR UR STRIP: 1.01 (ref 1–1.03)
SQUAMOUS #/AREA URNS AUTO: ABNORMAL /LPF
TRICHOMONAS, WET PREP: ABNORMAL
UROBILINOGEN UR STRIP-ACNC: 0.2 E.U./DL
WBC #/AREA URNS AUTO: ABNORMAL /HPF
WBC'S/HIGH POWER FIELD, WET PREP: ABNORMAL
YEAST, WET PREP: ABNORMAL

## 2023-12-03 PROCEDURE — 99213 OFFICE O/P EST LOW 20 MIN: CPT | Performed by: FAMILY MEDICINE

## 2023-12-03 PROCEDURE — 81001 URINALYSIS AUTO W/SCOPE: CPT | Performed by: FAMILY MEDICINE

## 2023-12-03 PROCEDURE — 87210 SMEAR WET MOUNT SALINE/INK: CPT | Performed by: FAMILY MEDICINE

## 2023-12-03 RX ORDER — CLOTRIMAZOLE AND BETAMETHASONE DIPROPIONATE 10; .64 MG/G; MG/G
CREAM TOPICAL 2 TIMES DAILY
Qty: 30 G | Refills: 1 | Status: SHIPPED | OUTPATIENT
Start: 2023-12-03

## 2023-12-03 NOTE — PROGRESS NOTES
Assessment:       Itching in the vaginal area  - UA Macroscopic with reflex to Microscopic and Culture - Clinic Collect  - Wet prep - Clinic Collect  - UA Microscopic with Reflex to Culture         Plan:     With itching and irritation in the vaginal area with significant erythema.  Wet prep is negative however I think her concern is more of the outer labia majora.  UA is unremarkable.  I suspect this is likely still yeast infection we will treat with Lotrisone cream twice daily.  Follow-up if symptoms getting worse or not improving in 1 week.        Subjective:       73 year old female presents for evaluation of severe itching and irritation of the labia majora for the past 2 days.  It has been red and very irritated and burns every time she goes to the bathroom and urine touches the outside of her skin.  He is a diabetic.  She did take 1 dose of Diflucan the other day but has not given her significant relief of her symptoms.  She denies vaginal discharge.  No significant increased urinary frequency or urgency.  She has had no fevers or abdominal pain or back pain.  No blood in her urine.    Patient Active Problem List   Diagnosis    Type 2 diabetes mellitus with stage 3b chronic kidney disease, with long-term current use of insulin (H)    Hypothyroidism    Hypertension, benign essential, goal below 140/90    Polycythemia    Lumbar burst fracture, with routine healing, subsequent encounter    Chronic kidney disease, stage III (moderate) (H)    Hyperlipemia, mixed    Depression    History of stroke    Thoracic compression fracture, closed, initial encounter (H)    Weight loss    Hypoglycemia    Aortic stenosis, moderate    Loss of consciousness due to hypoglycemia (glucose 46 per EMT)    HTN (hypertension)    Myalgia    Elevated CK    Decreased thyroid stimulating hormone (TSH) level    Chest pain, unspecified type    Thoracic aortic aneurysm without rupture, unspecified part (H24)    Non-traumatic rhabdomyolysis     GASTROENTEROLOGY VISIT    NAME: Caroline Wu    REASON FOR VISIT:  Chief Complaint   Patient presents with   • Follow-up     IBS        LAST VISIT WITH ME:  3/1/2017.    SUBJECTIVE:   This is a 33 year old woman with right lower quadrant abdominal pain. The pain is unrelated to eating and bowel movements and is different from her IBS pain which is localized to the left lower quadrant. The pain is aching in character but she develops intense burning in the same area when she sits up from a reclining position. She has tenderness in the area when there is contact or pressure from clothing. She has a history of sciatica on both sides and fibromyalgia and Lyme disease and has recently been started on Cymbalta. She has had multiple operations including cholecystectomy, hysterectomy and 2 C sections. She was not able to tolerate amitryptiline.     PHYSICAL EXAM:  Vitals:    Visit Vitals   • /64   • Pulse 92   • Resp 20   • Wt 103.4 kg   • LMP 08/18/2009   • BMI 36.8 kg/m2       GENERAL:  The patient is awake and alert and in no acute distress. Appropriate and pleasant. Nontoxic appearance.  HENT:  External ears and nose are normal. Skull is atraumatic, normocephalic. Lips and oropharynx are without lesions. Dentition is average.  RESPIRATORY:  Clear to auscultation bilaterally with unlabored breathing.  CARDIAC:  Regular rate and rhythm. No appreciable murmurs, rubs, or gallops. There is no lower extremity edema.  ABDOMEN:  Soft. Non tender. Non distended. Bowel sounds are normoactive. No hepatosplenomegaly.  ·     ASSESSMENT AND PLAN:  Ms. Wu is a 33 year old female with unexplained abdominal pain. The symptoms are suggestive of a neuropathic pain rather than a painful lesion in the GI tract. I explained the alternatives: referral to pain management, MRI of the back for further assessment and laparoscopy with lysis of adhesions.   I will discuss these options with Dr Mclaughlin and Loni.       The  Leg cramps    Atherosclerosis of native coronary artery of native heart with angina pectoris (H24)    Herpes simplex type 2 infection    S/P aortic valve replacement    Recurrent major depression in full remission (H24)    Recurrent candidiasis of vagina       Past Medical History:   Diagnosis Date    Chronic kidney disease     Diabetes (H)     Hypertension        Past Surgical History:   Procedure Laterality Date    APPENDECTOMY      CHOLECYSTECTOMY      HYSTERECTOMY  1989    LARYNGOSCOPY N/A 1/26/2016    Procedure: LARYNGOSCOPY, REMOVAL OF FOREIGN BODY;  Surgeon: Hung VILLASEÑOR MD;  Location: [facility] [place];  Service:     NASAL FRACTURE SURGERY      OOPHORECTOMY Bilateral 1989    TONSILLECTOMY & ADENOIDECTOMY         Current Outpatient Medications   Medication    acetaminophen (TYLENOL) 500 MG tablet    albuterol (PROAIR HFA/PROVENTIL HFA/VENTOLIN HFA) 108 (90 Base) MCG/ACT inhaler    alendronate (FOSAMAX) 70 MG tablet    amLODIPine (NORVASC) 2.5 MG tablet    blood sugar diagnostic (CONTOUR NEXT STRIPS) Strp    Continuous Blood Gluc  (FREESTYLE JAVAN 2 READER) GUILLAUME    Continuous Blood Gluc Sensor (FREESTYLE JAVAN 2 SENSOR) MISC    empagliflozin (JARDIANCE) 25 MG TABS tablet    furosemide (LASIX) 20 MG tablet    gabapentin (NEURONTIN) 100 MG capsule    levothyroxine (SYNTHROID/LEVOTHROID) 125 MCG tablet    lisinopril (ZESTRIL) 20 MG tablet    ondansetron (ZOFRAN) 4 MG tablet    rosuvastatin (CRESTOR) 5 MG tablet    tirzepatide (MOUNJARO) 10 MG/0.5ML pen    vitamin D3 (CHOLECALCIFEROL) 50 mcg (2000 units) tablet    fluconazole (DIFLUCAN) 150 MG tablet    insulin glargine (LANTUS PEN) 100 UNIT/ML pen     No current facility-administered medications for this visit.       Allergies   Allergen Reactions    Amoxicillin Hives    Codeine Itching    Metformin Diarrhea    Prednisone Itching    Simvastatin      Other reaction(s): *Unknown    Sulfa Antibiotics Itching and Hives    Adhesive Tape Rash     EKG stickers  patient was given a summary sheet of my assessment and plan. The patient was advised to call or return with any worsening, questions, issues, or concerns.        Norbert Strickland MD         and Tegaderm        Family History   Problem Relation Age of Onset    Breast Cancer Mother 62.00    Diabetes Father     Glaucoma Father     Diabetes Brother     Fibromyalgia Brother     No Known Problems Daughter        Social History     Socioeconomic History    Marital status: Single     Spouse name: None    Number of children: 1    Years of education: None    Highest education level: None   Tobacco Use    Smoking status: Never    Smokeless tobacco: Never   Substance and Sexual Activity    Alcohol use: Yes     Alcohol/week: 1.7 standard drinks of alcohol     Comment: Alcoholic Drinks/day: once a week    Drug use: No   Social History Narrative    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     Social Determinants of Health     Interpersonal Safety: Low Risk  (10/27/2023)    Interpersonal Safety     Do you feel physically and emotionally safe where you currently live?: Yes     Within the past 12 months, have you been hit, slapped, kicked or otherwise physically hurt by someone?: No     Within the past 12 months, have you been humiliated or emotionally abused in other ways by your partner or ex-partner?: No         Review of Systems  Pertinent items are noted in HPI.      Objective:     /72   Pulse 82   Temp 97.8  F (36.6  C) (Oral)   Wt 50.3 kg (111 lb)   SpO2 98%   BMI 21.46 kg/m       General appearance: alert, appears stated age, and cooperative  Abdomen: Soft, nontender  Back: No CVA tenderness.  Pelvic: With brightly erythematous confluent erythema of the labia majora with some satellite lesions noted.  No vaginal discharge.      Results for orders placed or performed in visit on 12/03/23   UA Macroscopic with reflex to Microscopic and Culture - Clinic Collect     Status: Abnormal    Specimen: Urine, Clean Catch   Result Value Ref Range    Color Urine Yellow Colorless, Straw, Light Yellow, Yellow    Appearance Urine Clear Clear    Glucose Urine >=1000 (A) Negative mg/dL    Bilirubin Urine Negative  Negative    Ketones Urine 80 (A) Negative mg/dL    Specific Gravity Urine 1.015 1.005 - 1.030    Blood Urine Trace (A) Negative    pH Urine 5.0 5.0 - 8.0    Protein Albumin Urine Negative Negative mg/dL    Urobilinogen Urine 0.2 0.2, 1.0 E.U./dL    Nitrite Urine Negative Negative    Leukocyte Esterase Urine Trace (A) Negative   UA Microscopic with Reflex to Culture     Status: Abnormal   Result Value Ref Range    Bacteria Urine Few (A) None Seen /HPF    RBC Urine 2-5 (A) 0-2 /HPF /HPF    WBC Urine 5-10 (A) 0-5 /HPF /HPF    Squamous Epithelials Urine Moderate (A) None Seen /LPF    Narrative    Urine Culture not indicated       This note has been dictated using voice recognition software. Any grammatical or context distortions are unintentional and inherent to the software

## 2023-12-03 NOTE — PATIENT INSTRUCTIONS
Apply the steroid/antifungal cream to the red irritated area.  Follow-up if not improving in the next week

## 2023-12-06 ENCOUNTER — TELEPHONE (OUTPATIENT)
Dept: FAMILY MEDICINE | Facility: CLINIC | Age: 73
End: 2023-12-06

## 2023-12-06 DIAGNOSIS — G62.9 PERIPHERAL POLYNEUROPATHY: ICD-10-CM

## 2023-12-06 NOTE — TELEPHONE ENCOUNTER
[facility] phone call message- patient requesting a refill:    Full Medication Name: gabapentin (NEURONTIN) 100 MG capsule     Dose: Take 3 capsules (300 mg) by mouth 2 times daily - Oral     Pharmacy confirmed as   [id number] [place] [place] [address] AT [facility] [address]  [address]  [facility] [address]  Phone: [phone] Fax: [phone]  : Yes    Additional Comments: requesting a 3 month supply     OK to leave a message on voice mail? Yes    Primary language: English      needed? No    Call taken on December 6, 2023 at 1:19 PM by Caden Rubi

## 2023-12-07 RX ORDER — GABAPENTIN 100 MG/1
300 CAPSULE ORAL 2 TIMES DAILY
Qty: 180 CAPSULE | Refills: 3 | Status: CANCELLED | OUTPATIENT
Start: 2023-12-07

## 2023-12-08 DIAGNOSIS — G62.9 PERIPHERAL POLYNEUROPATHY: ICD-10-CM

## 2023-12-08 RX ORDER — GABAPENTIN 300 MG/1
300 CAPSULE ORAL 2 TIMES DAILY
Qty: 180 CAPSULE | Refills: 3 | Status: SHIPPED | OUTPATIENT
Start: 2023-12-08

## 2023-12-11 ENCOUNTER — ALLIED HEALTH/NURSE VISIT (OUTPATIENT)
Dept: FAMILY MEDICINE | Facility: CLINIC | Age: 73
End: 2023-12-11
Payer: COMMERCIAL

## 2023-12-11 DIAGNOSIS — Z23 NEED FOR PROPHYLACTIC VACCINATION AND INOCULATION AGAINST INFLUENZA: Primary | ICD-10-CM

## 2023-12-11 PROCEDURE — 90662 IIV NO PRSV INCREASED AG IM: CPT

## 2023-12-11 PROCEDURE — 99207 PR NO CHARGE NURSE ONLY: CPT

## 2023-12-11 PROCEDURE — G0008 ADMIN INFLUENZA VIRUS VAC: HCPCS

## 2023-12-15 ENCOUNTER — VIRTUAL VISIT (OUTPATIENT)
Dept: PHARMACY | Facility: CLINIC | Age: 73
End: 2023-12-15
Payer: COMMERCIAL

## 2023-12-15 DIAGNOSIS — E11.9 DIABETES MELLITUS, TYPE II, INSULIN DEPENDENT (H): Primary | ICD-10-CM

## 2023-12-15 DIAGNOSIS — R11.0 NAUSEA: ICD-10-CM

## 2023-12-15 DIAGNOSIS — E78.2 HYPERLIPEMIA, MIXED: ICD-10-CM

## 2023-12-15 DIAGNOSIS — I10 ESSENTIAL HYPERTENSION: ICD-10-CM

## 2023-12-15 DIAGNOSIS — Z79.4 DIABETES MELLITUS, TYPE II, INSULIN DEPENDENT (H): Primary | ICD-10-CM

## 2023-12-15 PROCEDURE — 99207 PR NO CHARGE LOS: CPT | Performed by: PHARMACIST

## 2023-12-15 RX ORDER — ONDANSETRON 4 MG/1
4 TABLET, FILM COATED ORAL EVERY 8 HOURS PRN
Qty: 40 TABLET | Refills: 1 | Status: SHIPPED | OUTPATIENT
Start: 2023-12-15

## 2023-12-15 RX ORDER — PIOGLITAZONEHYDROCHLORIDE 15 MG/1
15 TABLET ORAL DAILY
Qty: 90 TABLET | Refills: 3 | Status: SHIPPED | OUTPATIENT
Start: 2023-12-15 | End: 2024-02-01

## 2023-12-15 NOTE — PROGRESS NOTES
Medication Therapy Management (MTM) Encounter    ASSESSMENT:                            Medication Adherence/Access: No issues identified    Upper respiratory tract infection:  Continue to encourage supportive measures.  Refilled ondansetron as it seems to help with the nausea.    Type 2 Diabetes:  Not meeting an HbA1c goal of less than 7% but is meeting an HbA1c goal of less than 8%.  Patient could benefit from additional therapy.  At this time we discussed putting her back on Lantus every day versus starting pioglitazone.  I elected to do the pioglitazone because I would really like to avoid a daily insulin with her at this point.  Of note I think we need to continue to watch her yeast infections as they are likely not being helped by the Jardiance.      Hypertension:  Meeting goal blood pressure of less than 130/80, and patient has not been experiencing symptoms of hypoglycemia.    Hyperlipidemia:   Patient currently on moderate intensity statin.  Will continue to watch to see if myalgias develop.      PLAN:                            Start pioglitazone 15mg     Follow-up: 3 weeks    SUBJECTIVE/OBJECTIVE:                          Nichole Titus is a 73 year old female called for a follow-up visit from 12/1/23.       Reason for visit: Diabetes follow up .    Allergies/ADRs: Reviewed in chart  Past Medical History: Reviewed in chart  Tobacco: She reports that she has never smoked. She has never used smokeless tobacco.  Social History     Tobacco Use     Smoking status: Never     Smokeless tobacco: Never   Substance Use Topics     Alcohol use: Yes     Alcohol/week: 1.7 standard drinks of alcohol     Comment: Alcoholic Drinks/day: once a week     Drug use: No       Medication Adherence/Access: no issues reported    Upper respiratory infection:  Ondansetron as needed  Patient states that after she got her flu and COVID shots earlier this week she started feeling very poorly.  Her symptoms are similar to when she was sick  2 weeks ago, however she states that she had been getting better in between the 2 illnesses.    Diabetes Type 2 Diabetes CKD3b, with long-term current use of insulin:      Mounjaro 10mg once week (decreased - 11/30)     Jardiance 25mg     Lantus 10 units if blood glucose >250    Blood sugars have gone up since decreasing the Mounjaro. Blood sugars were 300 one day (took Lantus 10 units) woke up today and BG was 162; typically 140-160 in the AM; might go up to 242 immediately post prandially.  Her appetite still has not really returned.  She has not weighed herself so she does not know if she is continuing to lose weight.  Of note she was seen in urgent care earlier this week for a vaginal yeast infection, for which she was given a topical cream.     - PMH: Jan 2023 hospitalized and was hypoglycemic and had a subsequent seizure; metformin (diarrhea)     Patient is not experiencing side effects.    Current diabetes symptoms: none  Diet/Exercise: Walks 3-4 times daily with dog     Eye exam is up to date  Foot exam is up to date  Urine Albumin:   Lab Results   Component Value Date    UMALCR  01/25/2023      Comment:      Unable to calculate, urine albumin and/or urine creatinine is outside detectable limits.  Microalbuminuria is defined as an albumin:creatinine ratio of 17 to 299 for males and 25 to 299 for females. A ratio of albumin:creatinine of 300 or higher is indicative of overt proteinuria.  Due to biologic variability, positive results should be confirmed by a second, first-morning random or 24-hour timed urine specimen. If there is discrepancy, a third specimen is recommended. When 2 out of 3 results are in the microalbuminuria range, this is evidence for incipient nephropathy and warrants increased efforts at glucose control, blood pressure control, and institution of therapy with an angiotensin-converting-enzyme (ACE) inhibitor (if the patient can tolerate it).        Lab Results   Component Value Date    A1C  7.3 (H) 10/27/2023     Hypertension     Amlodipine 2.5 mg (restarted at lower dose 10/20/23, amlodipine 5 mg was stopped 10/6/23 due to hypotension)    Lisinopril 20mg daily - this was decreased from 40mg 8/18/23  Patient reports no current medication side effects.  In the past has had episodes where she gets extremely dizzy and lightheaded and needs to sit down for about 10 to 15 minutes.  She has not had any of those since we last talked.  Her home blood sugars have been typically in the 120s systolic.       BP Readings from Last 3 Encounters:   12/03/23 114/72   10/27/23 116/73   07/24/23 103/67     Pulse Readings from Last 3 Encounters:   12/03/23 82   10/27/23 65   07/24/23 60     Hyperlipidemia   Rosuvastatin 5 mg daily  Stopped atorvastatin in July due to myalgias and leg cramps.  Rosuvastatin started in late October for secondary prevention (AAA) and due to high LDL. At last visit patient stated that she may be having muscle cramps from this medication but still wanted to evaluate them.  At this visit it sounds like patient misplaced rosuvastatin and has not been taking this medicine for at least 2 weeks.  She has not refilled this at the pharmacy and will pick it up later today.       Recent Labs   Lab Test 10/27/23  0940 07/24/23  1029   CHOL 232* 130   HDL 66 56   * 57   TRIG 97 86       Today's Vitals: There were no vitals taken for this visit.  ----------------      I spent 20 minutes with this patient today. Dr. Suazo (resident physician) was provided the recommendations above  via routed note and Dr. Hewitt is the authorizing prescriber for this visit through the pharmacist collaborative practice agreement.. A copy of the visit note was provided to the patient's provider(s).    A summary of these recommendations was declined by the patient.    April Almazan, Kelly     Telemedicine Visit Details  Type of service:  Telephone visit  Start Time: 8:30 AM  End Time: 8:49 AM     Medication  Therapy Recommendations  Type 2 diabetes mellitus with stage 3b chronic kidney disease, with long-term current use of insulin (H)    Rationale: Synergistic therapy - Needs additional medication therapy - Indication   Recommendation: Start Medication - pioglitazone 15 MG tablet   Status: Accepted per CPA

## 2023-12-27 ENCOUNTER — DOCUMENTATION ONLY (OUTPATIENT)
Dept: FAMILY MEDICINE | Facility: CLINIC | Age: 73
End: 2023-12-27

## 2023-12-27 NOTE — CONFIDENTIAL NOTE
Please reference list for forms that require a visit for completion.  Please remind patients that providers are given 3-5 business days to complete and return forms.      Form type:  [employer] Request for Reasonable Accomodation and accessible underground parking.      Date form received: 12/21/23    Date form completed by Physician:    How was form returned to patient (mailed, faxed, or at  for patient to ): [phone]    Date form mailed/faxed/left at  for patient and sent to HIM for scanning:      Once form is left for patient, faxed, or mailed PCS will then close the documentation only encounter.

## 2023-12-29 ENCOUNTER — DOCUMENTATION ONLY (OUTPATIENT)
Dept: FAMILY MEDICINE | Facility: CLINIC | Age: 73
End: 2023-12-29
Payer: COMMERCIAL

## 2023-12-29 NOTE — PROGRESS NOTES
Patient Form    Filled out form for patient stating she should be allowed to park in a handicap spot at her building due to a history of multiple vertebral fractures and chronic back pain. Form sent to central scanning.    I also completed a form stating her cat is a medical support animal. She reports instances where the cat will lick her face when something is wrong, such as hypoglycemia. This form also sent to central scanning.    Logan Suazo, DO

## 2024-01-02 ENCOUNTER — TELEPHONE (OUTPATIENT)
Dept: FAMILY MEDICINE | Facility: CLINIC | Age: 74
End: 2024-01-02
Payer: COMMERCIAL

## 2024-01-02 DIAGNOSIS — Z79.4 DIABETES MELLITUS, TYPE II, INSULIN DEPENDENT (H): ICD-10-CM

## 2024-01-02 DIAGNOSIS — I25.119 ATHEROSCLEROSIS OF NATIVE CORONARY ARTERY OF NATIVE HEART WITH ANGINA PECTORIS (H): ICD-10-CM

## 2024-01-02 DIAGNOSIS — E11.9 DIABETES MELLITUS, TYPE II, INSULIN DEPENDENT (H): ICD-10-CM

## 2024-01-02 NOTE — TELEPHONE ENCOUNTER
[facility] phone call message- general phone call:    Reason for call: Pt would like to speak with the Pharm D.  She has questions regarding her mounjaro medication and also a covid question    Return call needed: Yes    OK to leave a message on voice mail? Yes    Primary language: English      needed? No    Call taken on January 2, 2024 at 8:47 AM by Caden Rubi

## 2024-01-03 RX ORDER — ROSUVASTATIN CALCIUM 10 MG/1
5 TABLET, COATED ORAL DAILY
Qty: 15 TABLET | Refills: 0 | Status: SHIPPED | OUTPATIENT
Start: 2024-01-03 | End: 2024-02-16

## 2024-01-03 RX ORDER — ROSUVASTATIN CALCIUM 5 MG/1
5 TABLET, COATED ORAL DAILY
Qty: 90 TABLET | Refills: 3 | Status: SHIPPED | OUTPATIENT
Start: 2024-01-03

## 2024-01-03 RX ORDER — TIRZEPATIDE 10 MG/.5ML
10 INJECTION, SOLUTION SUBCUTANEOUS
Qty: 2 ML | Refills: 11 | Status: SHIPPED | OUTPATIENT
Start: 2024-01-03 | End: 2024-03-29

## 2024-01-03 NOTE — TELEPHONE ENCOUNTER
Patient calling back. Patient is concerned because she hasn't received a call back from the message she sent yesterday and has now been without her medication for two days. Would really appreciate a call from someone. Patient did not want to speak to a triage nurse only to Dr. Wade or her PCP.

## 2024-01-03 NOTE — TELEPHONE ENCOUNTER
Patient is out of Mounjaro 10mg. She wants to know if she can take the 15mg pens in it's place (she has 3). Patient reports that her appetite has mostly recovered - she eats one larger meal and then 1-2 smaller meals per day.  Told her it would be fine to swap out a 15mg pen for a 10mg pen, but she might experience some more nausea.    Patient also reports a potential exposure to COVID, and wants to know if she should get the COVID vaccine.  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  Nichole last say woman on Saturday, 12/30.  Daughter tested positive Monday, 1/1, and now the woman is exhibiting symptoms. Patient is wondering if she should get her COVID vaccine or wait.  Explained to patient that the incubation period is 5-7 days and that she should wait until that is over to get her COVID vaccine.    Finally, patient is experiencing more yeast infections than seems normal and wonders if it could be the Jardiance.  I told her that I suspect it could be and we can discuss more at our MT visit on Friday.    April Almazan, PharmD

## 2024-01-05 ENCOUNTER — VIRTUAL VISIT (OUTPATIENT)
Dept: PHARMACY | Facility: CLINIC | Age: 74
End: 2024-01-05
Payer: COMMERCIAL

## 2024-01-05 DIAGNOSIS — R06.02 SHORTNESS OF BREATH: ICD-10-CM

## 2024-01-05 DIAGNOSIS — E11.9 DIABETES MELLITUS, TYPE II, INSULIN DEPENDENT (H): Primary | ICD-10-CM

## 2024-01-05 DIAGNOSIS — M85.88 OSTEOPENIA OF LUMBAR SPINE: ICD-10-CM

## 2024-01-05 DIAGNOSIS — I10 ESSENTIAL HYPERTENSION: ICD-10-CM

## 2024-01-05 DIAGNOSIS — R52 PAIN: ICD-10-CM

## 2024-01-05 DIAGNOSIS — Z79.4 DIABETES MELLITUS, TYPE II, INSULIN DEPENDENT (H): Primary | ICD-10-CM

## 2024-01-05 DIAGNOSIS — B37.31 CANDIDIASIS OF VAGINA: ICD-10-CM

## 2024-01-05 DIAGNOSIS — E78.2 HYPERLIPEMIA, MIXED: ICD-10-CM

## 2024-01-05 DIAGNOSIS — E03.9 HYPOTHYROIDISM, UNSPECIFIED TYPE: ICD-10-CM

## 2024-01-05 PROCEDURE — 99207 PR NO CHARGE LOS: CPT | Mod: 93 | Performed by: PHARMACIST

## 2024-01-05 RX ORDER — CLOTRIMAZOLE 1 %
1 CREAM WITH APPLICATOR VAGINAL AT BEDTIME
Qty: 45 G | Refills: 3 | Status: SHIPPED | OUTPATIENT
Start: 2024-01-05 | End: 2024-08-15

## 2024-01-05 NOTE — PROGRESS NOTES
Medication Therapy Management (MTM) Encounter    ASSESSMENT:                            Medication Adherence/Access: No issues identified    Diabetes Type 2 Diabetes CKD3b, with long-term current use of insulin, hx of vaginal candidiasis  Not meeting an HbA1c goal of less than 7% but is meeting an HbA1c goal of less than 8%. Given that patient is seeing an increase in symptomatic vaginal candidiasis, it is appropriate to back off on her Jardiance with discontinuation if symptoms persist.  Patient is continuing to use clotrimazole with betamethasone vaginally for symptom relief.  This was appropriate when she was very inflamed in December, but actually topical steroid may not be helping at this point.  Can benefit from a product with only the topical antifungal.     Hypertension   Stable.      Hyperlipidemia   Stable.  Will continue to monitor if myalgias develop.      Hypothyroidism    Patient could benefit from TSH - will obtain at in-person visit in February    Osteporosis:   Stable    Pain:  Stable    Shortness of breath:  Stable    PLAN:                            Decrease Jardiance to 10mg daily   Switch clotrimazole/betamethasone cream to just clotrimazole cream    Follow-up: 5 weeks with Dr Suazo and myself.     SUBJECTIVE/OBJECTIVE:                          Nichole Titus is a 73 year old female called for a follow-up visit from 12/15/23. This is the first visit of 2024.     Reason for visit: MTM.    Allergies/ADRs: Reviewed in chart  Past Medical History: Reviewed in chart  Tobacco: She reports that she has never smoked. She has never used smokeless tobacco.  Social History     Tobacco Use    Smoking status: Never    Smokeless tobacco: Never   Substance Use Topics    Alcohol use: Yes     Alcohol/week: 1.7 standard drinks of alcohol     Comment: Alcoholic Drinks/day: once a week    Drug use: No       Medication Adherence/Access: no issues reported    Diabetes Type 2 Diabetes CKD3b, with long-term current use  of insulin, hx of vaginal candidiasis:    Mounjaro 10mg once week (dose decreased - 11/30/23 2/2 extreme weight loss and nausea)   Jardiance 25mg   Lantus 10 units if blood glucose >250  Pioglitazone 15mg daily - started 12/15/23    Mounjaro 15 this past week (didn't have the 10mg); did feel that this improved her blood sugars the past 2 days. Cannot tell if she has gained any weight but has not had nausea recently and did not see increased nausea with the 15 mg dose 2 days ago.  BG was 116/130 fasting yesterday AM.    Yeast infections - seemed to have increased; once a week to every other week; had an urgent care visit 12/3 for which she was prescribed clotrimazole/betamethasone cream, which she still uses on occasion.  Last took the fluconazole for this visit.      - PMH: Jan 2023 hospitalized and was hypoglycemic and had a subsequent seizure; metformin (diarrhea)     Patient is not experiencing side effects.    Current diabetes symptoms: none  Diet/Exercise: Walks 3-4 times daily with dog     Eye exam - a year ago; will make appointment soon  Foot exam: up to date  Urine Albumin:   Lab Results   Component Value Date    UMALCR  01/25/2023      Comment:      Unable to calculate, urine albumin and/or urine creatinine is outside detectable limits.  Microalbuminuria is defined as an albumin:creatinine ratio of 17 to 299 for males and 25 to 299 for females. A ratio of albumin:creatinine of 300 or higher is indicative of overt proteinuria.  Due to biologic variability, positive results should be confirmed by a second, first-morning random or 24-hour timed urine specimen. If there is discrepancy, a third specimen is recommended. When 2 out of 3 results are in the microalbuminuria range, this is evidence for incipient nephropathy and warrants increased efforts at glucose control, blood pressure control, and institution of therapy with an angiotensin-converting-enzyme (ACE) inhibitor (if the patient can tolerate it).         Lab Results   Component Value Date    A1C 7.3 (H) 10/27/2023     Hypertension   Amlodipine 2.5 mg (restarted at lower dose 10/20/23, patient got hypotensive on 5mg daily  Lisinopril 20mg daily - this was decreased from 40mg 8/18/23  Furosemide 20mg daily  Patient reports no current medication side effects.  In the past has had episodes where she gets extremely dizzy and lightheaded and needs to sit down for about 10 to 15 minutes.  She has not had any of those since we last talked.  Her home blood pressure have been typically in the 120s systolic.  Was 114/77 this AM.       BP Readings from Last 3 Encounters:   12/03/23 114/72   10/27/23 116/73   07/24/23 103/67     Pulse Readings from Last 3 Encounters:   12/03/23 82   10/27/23 65   07/24/23 60     Hyperlipidemia Rosuvastatin 5 mg daily - just restarted yesterday, so too soon to tell if it is causing muscle cramps.  Will address at next visit.       Recent Labs   Lab Test 10/27/23  0940 07/24/23  1029   CHOL 232* 130   HDL 66 56   * 57   TRIG 97 86       Hypothyroidism    Levothyroxine 125 mcg daily.   Patient is having the following symptoms: hypothyroidism -  cold intolerance, dry skin, myalgias, muscle cramps, weakness, weight gain, and constipation.       TSH   Date Value Ref Range Status   01/19/2023 0.15 (L) 0.30 - 5.00 uIU/mL Final       Osteporosis:   Alendronate 70mg once weekly on Sundays  Vitamin D 50mcg daily  No issues with these medications.  Knows dosing instructions for alendronate.  Last DXA in 2017.    Pain:  Acetaminophen 500mg twice daily   Gabapentin 300mg twice daily  Patient reports this works well for her.     Shortness of breath:  Albuterol as needed   Patient states that she only has to use this with extreme cold, so has not had to use in over a year.      Today's Vitals: There were no vitals taken for this visit.  ----------------      I spent 20 minutes with this patient today. Dr. Suazo (resident physician) was provided the  recommendations above  via routed note and Dr. Waylon Alvarez is the authorizing prescriber for this visit through the pharmacist collaborative practice agreement.. A copy of the visit note was provided to the patient's provider(s).    A summary of these recommendations was declined by the patient.    April Almazan, Kelly     Telemedicine Visit Details  Type of service:  Telephone visit  Start Time:  8:10pm  End Time: 8:30 AM     Medication Therapy Recommendations  Candidiasis of vagina    Current Medication: clotrimazole-betamethasone (LOTRISONE) 1-0.05 % external cream   Rationale: Unsafe medication for the patient - Adverse medication event - Safety   Recommendation: Change Medication - clotrimazole 1 % vaginal cream   Status: Accepted per CPA         Diabetes mellitus, type II, insulin dependent (H)    Current Medication: empagliflozin (JARDIANCE) 25 MG TABS tablet (Discontinued)   Rationale: Undesirable effect - Adverse medication event - Safety   Recommendation: Decrease Dose - Jardiance 10 MG Tabs   Status: Accepted per CPA

## 2024-01-16 ENCOUNTER — TELEPHONE (OUTPATIENT)
Dept: FAMILY MEDICINE | Facility: CLINIC | Age: 74
End: 2024-01-16
Payer: COMMERCIAL

## 2024-01-16 NOTE — TELEPHONE ENCOUNTER
[facility] phone call message- general phone call:    Reason for call: The Pt called to talk to the Dr about her medication and would like a call back at [phone]    Return call needed: Yes    OK to leave a message on voice mail? Yes    Primary language: English      needed? No    Call taken on January 16, 2024 at 11:08 AM by Tommy Cameron

## 2024-01-18 ENCOUNTER — LAB (OUTPATIENT)
Dept: LAB | Facility: CLINIC | Age: 74
End: 2024-01-18
Payer: COMMERCIAL

## 2024-01-18 DIAGNOSIS — E03.9 ACQUIRED HYPOTHYROIDISM: ICD-10-CM

## 2024-01-18 DIAGNOSIS — E03.9 ACQUIRED HYPOTHYROIDISM: Primary | ICD-10-CM

## 2024-01-18 PROCEDURE — 36415 COLL VENOUS BLD VENIPUNCTURE: CPT

## 2024-01-18 PROCEDURE — 84439 ASSAY OF FREE THYROXINE: CPT

## 2024-01-18 PROCEDURE — 84443 ASSAY THYROID STIM HORMONE: CPT

## 2024-01-18 NOTE — TELEPHONE ENCOUNTER
Returned call.  Patient thinks that her thyroid medication is too high and does not want to wait until mid-February to get it checked. Offered patient a lab-only appointment for this afternoon and she accepted. TSH ordered.  April Almazan, DomingoD

## 2024-01-19 LAB
T4 FREE SERPL-MCNC: 1.18 NG/DL (ref 0.9–1.7)
TSH SERPL DL<=0.005 MIU/L-ACNC: 0.2 UIU/ML (ref 0.3–4.2)

## 2024-01-22 DIAGNOSIS — E03.9 HYPOTHYROIDISM, UNSPECIFIED TYPE: Primary | ICD-10-CM

## 2024-01-22 RX ORDER — LEVOTHYROXINE SODIUM 112 UG/1
112 TABLET ORAL DAILY
Qty: 30 TABLET | Refills: 1 | Status: SHIPPED | OUTPATIENT
Start: 2024-01-22 | End: 2024-05-17

## 2024-01-22 NOTE — PROGRESS NOTES
Recent TSH was decreased to 0.2. Although T4 WNL, will decrease dose based on patient's symptom complaints. Recommended patient return in 6-8 weeks to recheck thyroid studies.    Logan Suazo, DO

## 2024-02-01 ENCOUNTER — TRANSFERRED RECORDS (OUTPATIENT)
Dept: MULTI SPECIALTY CLINIC | Facility: CLINIC | Age: 74
End: 2024-02-01

## 2024-02-01 ENCOUNTER — TELEPHONE (OUTPATIENT)
Dept: FAMILY MEDICINE | Facility: CLINIC | Age: 74
End: 2024-02-01
Payer: COMMERCIAL

## 2024-02-01 DIAGNOSIS — E11.9 DIABETES MELLITUS, TYPE II, INSULIN DEPENDENT (H): ICD-10-CM

## 2024-02-01 DIAGNOSIS — Z79.4 DIABETES MELLITUS, TYPE II, INSULIN DEPENDENT (H): ICD-10-CM

## 2024-02-01 LAB — RETINOPATHY: NORMAL

## 2024-02-01 RX ORDER — PIOGLITAZONEHYDROCHLORIDE 30 MG/1
30 TABLET ORAL DAILY
Qty: 90 TABLET | Refills: 3 | Status: SHIPPED | OUTPATIENT
Start: 2024-02-01

## 2024-02-01 NOTE — TELEPHONE ENCOUNTER
Patient called because she is frustrated about her diabetes control.  Lately she states that she has been in the 200s and 300s and has not been doing anything differently.  Last night she was 312 at bedtime and gave herself 12 units of Lantus; when she woke up in the morning her blood sugar was 220s.    Reviewed medications with her. it appears that there was a miscommunication in early January where I instructed patient to decrease her Jardiance but she actually stopped it.  Of note she has had no yeast infections since stopping the Jardiance.  She is also on Mounjaro 10 mg weekly, and pioglitazone 15 mg daily.  Her appetite has been okay and she is up to 110 pounds following the dose decrease of Mounjaro in December.    Discussed several options with her.  We elected to restart the Jardiance at 10 mg to see if that helps her blood sugars and also does not cause yeast infections.  We also elected to go up to pioglitazone 30 mg daily.  Finally we increased the parameters on the Lantus to take 14 units if her blood glucose is greater than 300.  We are trying to avoid daily Lantus if we can, but at this point I wonder if that is going to be a possibility.    Patient to follow-up with myself and Dr. Suazo in 2 weeks.    April Almazan, DomingoD

## 2024-02-16 ENCOUNTER — OFFICE VISIT (OUTPATIENT)
Dept: PHARMACY | Facility: CLINIC | Age: 74
End: 2024-02-16
Payer: COMMERCIAL

## 2024-02-16 ENCOUNTER — OFFICE VISIT (OUTPATIENT)
Dept: FAMILY MEDICINE | Facility: CLINIC | Age: 74
End: 2024-02-16
Payer: COMMERCIAL

## 2024-02-16 VITALS
DIASTOLIC BLOOD PRESSURE: 75 MMHG | HEIGHT: 60 IN | SYSTOLIC BLOOD PRESSURE: 115 MMHG | TEMPERATURE: 97.3 F | HEART RATE: 68 BPM | WEIGHT: 106 LBS | OXYGEN SATURATION: 100 % | BODY MASS INDEX: 20.81 KG/M2 | RESPIRATION RATE: 12 BRPM

## 2024-02-16 DIAGNOSIS — I10 HYPERTENSION, BENIGN ESSENTIAL, GOAL BELOW 140/90: Chronic | ICD-10-CM

## 2024-02-16 DIAGNOSIS — F33.42 RECURRENT MAJOR DEPRESSION IN FULL REMISSION (H): ICD-10-CM

## 2024-02-16 DIAGNOSIS — E03.9 HYPOTHYROIDISM, UNSPECIFIED TYPE: ICD-10-CM

## 2024-02-16 DIAGNOSIS — R79.89 DECREASED THYROID STIMULATING HORMONE (TSH) LEVEL: ICD-10-CM

## 2024-02-16 DIAGNOSIS — N18.32 TYPE 2 DIABETES MELLITUS WITH STAGE 3B CHRONIC KIDNEY DISEASE, WITH LONG-TERM CURRENT USE OF INSULIN (H): Primary | ICD-10-CM

## 2024-02-16 DIAGNOSIS — Z79.4 TYPE 2 DIABETES MELLITUS WITH STAGE 3B CHRONIC KIDNEY DISEASE, WITH LONG-TERM CURRENT USE OF INSULIN (H): Primary | ICD-10-CM

## 2024-02-16 DIAGNOSIS — E78.2 HYPERLIPEMIA, MIXED: ICD-10-CM

## 2024-02-16 DIAGNOSIS — Z11.59 NEED FOR HEPATITIS C SCREENING TEST: ICD-10-CM

## 2024-02-16 DIAGNOSIS — I25.119 ATHEROSCLEROSIS OF NATIVE CORONARY ARTERY OF NATIVE HEART WITH ANGINA PECTORIS (H): ICD-10-CM

## 2024-02-16 DIAGNOSIS — E11.9 DIABETES MELLITUS, TYPE II, INSULIN DEPENDENT (H): Primary | ICD-10-CM

## 2024-02-16 DIAGNOSIS — I10 ESSENTIAL HYPERTENSION: ICD-10-CM

## 2024-02-16 DIAGNOSIS — Z79.4 DIABETES MELLITUS, TYPE II, INSULIN DEPENDENT (H): Primary | ICD-10-CM

## 2024-02-16 DIAGNOSIS — I71.20 THORACIC AORTIC ANEURYSM WITHOUT RUPTURE, UNSPECIFIED PART (H): ICD-10-CM

## 2024-02-16 DIAGNOSIS — E11.22 TYPE 2 DIABETES MELLITUS WITH STAGE 3B CHRONIC KIDNEY DISEASE, WITH LONG-TERM CURRENT USE OF INSULIN (H): Primary | ICD-10-CM

## 2024-02-16 PROBLEM — R40.20 LOSS OF CONSCIOUSNESS (H): Chronic | Status: ACTIVE | Noted: 2017-01-06

## 2024-02-16 PROBLEM — Z95.2 S/P AORTIC VALVE REPLACEMENT: Chronic | Status: ACTIVE | Noted: 2017-01-06

## 2024-02-16 PROBLEM — M62.82 NON-TRAUMATIC RHABDOMYOLYSIS: Status: ACTIVE | Noted: 2023-01-20

## 2024-02-16 LAB
CHOLEST SERPL-MCNC: 201 MG/DL
CREAT UR-MCNC: 55.3 MG/DL
FASTING STATUS PATIENT QL REPORTED: ABNORMAL
HBA1C MFR BLD: 7.4 % (ref 0–5.6)
HCV AB SERPL QL IA: NONREACTIVE
HDLC SERPL-MCNC: 97 MG/DL
HGB BLD-MCNC: 15.3 G/DL (ref 11.7–15.7)
LDLC SERPL CALC-MCNC: 90 MG/DL
MICROALBUMIN UR-MCNC: <12 MG/L
MICROALBUMIN/CREAT UR: NORMAL MG/G{CREAT}
NONHDLC SERPL-MCNC: 104 MG/DL
PHOSPHATE SERPL-MCNC: 4.1 MG/DL (ref 2.5–4.5)
PTH-INTACT SERPL-MCNC: 40 PG/ML (ref 15–65)
TRIGL SERPL-MCNC: 68 MG/DL

## 2024-02-16 PROCEDURE — 99207 PR NO CHARGE LOS: CPT | Performed by: PHARMACIST

## 2024-02-16 PROCEDURE — 83970 ASSAY OF PARATHORMONE: CPT

## 2024-02-16 PROCEDURE — 83036 HEMOGLOBIN GLYCOSYLATED A1C: CPT

## 2024-02-16 PROCEDURE — 82570 ASSAY OF URINE CREATININE: CPT

## 2024-02-16 PROCEDURE — 80061 LIPID PANEL: CPT

## 2024-02-16 PROCEDURE — 91320 SARSCV2 VAC 30MCG TRS-SUC IM: CPT

## 2024-02-16 PROCEDURE — 84100 ASSAY OF PHOSPHORUS: CPT

## 2024-02-16 PROCEDURE — 86803 HEPATITIS C AB TEST: CPT

## 2024-02-16 PROCEDURE — 99214 OFFICE O/P EST MOD 30 MIN: CPT | Mod: 25

## 2024-02-16 PROCEDURE — 85018 HEMOGLOBIN: CPT

## 2024-02-16 PROCEDURE — 36415 COLL VENOUS BLD VENIPUNCTURE: CPT

## 2024-02-16 PROCEDURE — 82043 UR ALBUMIN QUANTITATIVE: CPT

## 2024-02-16 PROCEDURE — 90480 ADMN SARSCOV2 VAC 1/ONLY CMP: CPT

## 2024-02-16 RX ORDER — RESPIRATORY SYNCYTIAL VIRUS VACCINE 120MCG/0.5
0.5 KIT INTRAMUSCULAR ONCE
Qty: 1 EACH | Refills: 0 | Status: CANCELLED | OUTPATIENT
Start: 2024-02-16 | End: 2024-02-16

## 2024-02-16 ASSESSMENT — PATIENT HEALTH QUESTIONNAIRE - PHQ9
10. IF YOU CHECKED OFF ANY PROBLEMS, HOW DIFFICULT HAVE THESE PROBLEMS MADE IT FOR YOU TO DO YOUR WORK, TAKE CARE OF THINGS AT HOME, OR GET ALONG WITH OTHER PEOPLE: NOT DIFFICULT AT ALL
SUM OF ALL RESPONSES TO PHQ QUESTIONS 1-9: 1
SUM OF ALL RESPONSES TO PHQ QUESTIONS 1-9: 1

## 2024-02-16 NOTE — PROGRESS NOTES
Preceptor Attestation:    I discussed the patient with the resident and evaluated the patient in person. I have verified the content of the note, which accurately reflects my assessment of the patient and the plan of care.   Supervising Physician:  Edmundo Fagan MD.

## 2024-02-16 NOTE — PROGRESS NOTES
Osteoporosis  - Alendronate 70 mg tab once weekly  - Vitamin d 2000 unit tablet once daily    Hypertension  - Amlodipine 2.5 mg tablet once daily  - lisinopril 20 mg tablet once daily    Diabetes  - Jardiance 10 mg tablet once daily  - Mounjaro 10 mg once weekly  - Pioglitazone 30 mg once daily  - Lantus 10 units daily at bedtime    Pain  - Acetaminophen 500 mg tablet 1 every 6 hours as needed  - Gabapentin 300 mg capsules 1 twice daily    Shortness of breath   - Albuterol HFA 2 puffs every 6 hours as needed    Hypothyroidism  - Levothyroxine 112 mcg tablet once daily   Dose reduced from 125 mcg on 1/16/24    Hyperlipidemia  - Rosuvastatin 5 mg tablet once daily

## 2024-02-16 NOTE — PROGRESS NOTES
Medication Therapy Management (MTM) Encounter    ASSESSMENT:                            Medication Adherence/Access: No issues identified    Diabetes: Controlled, patient is at A1c goal of <7.5%. She may benefit from further optimization of medications in order to completely stop the use of insulin.     Hypertension: Controlled, blood pressure is well under goal of <130/80 mmHg. Patient may benefit from adjusting her medications for blood pressure control such as stopping furosemide and starting a thiazide diuretic or optimizing amlodipine.     Hyperlipidemia: Uncontrolled; patient started taking statin again regularly and needs more time before she is due for a fasting lipid panel (4-12 weeks after changes).     Hypothyroidism: Uncontrolled; Most recent TSH, complaints of sleeplessness, hair loss, and lack of appetite. dose changed 1 month ago, and is too early to recheck thyroid levels which are checked 6-8 weeks after changes.     PLAN:                            - Consider taking biotin over the counter to help with hair loss  - Follow up with cardiology concerning use of furosemide.     Follow-up: In-person 3/29/24 with Dr. Almazan.     Future considerations  - Discontinue lantus  - Increase dose of pioglitazone to 45 mg once daily    SUBJECTIVE/OBJECTIVE:                          Nichole Titus is a 73 year old female seen for a follow-up visit from 1/5/2024 with Dr. Almazan.       Reason for visit: Diabetes follow-up.    Allergies/ADRs: Reviewed in chart  Past Medical History: Reviewed in chart  Tobacco: She reports that she has never smoked. She has never used smokeless tobacco.  Alcohol: none    Medication Adherence/Access: no issues reported  Lays out medications in a pill box, takes gabapentin and tylenol at night. Never misses a dose of medications.     Diabetes   Jardiance 10 mg tablet once daily   Mounjaro 10 mg once weekly  Pioglitazone 30 mg once daily  Lantus 10 units once daily at bedtime (has  been taking rarely)  Monitoring/blood sugar control:   Time in range: 65% (last 7 days)   High (181-240 mg/dL): 30%  Very high (>240 mg/dL): 5%  No lows        -no complaints of any side effects including yeast infections, no nausea/vomiting or edema.   - states that she does not use the insulin unless he has been having high blood sugars (over 250 mg/dL), which happens about once or twice per month.     Current diabetes symptoms: no complaints  Diet/Exercise: walks every single day with her dog (about a mile to 2 miles every day; more in the summer).   - likes to have a big meal around 2 PM and a smaller meal at 4-5 PM, does not eat after 6 PM. Depends on breakfast. Has low-carb boost drinks for meal/snack supplements.   - Just drinks her coffee with a little bit of milk, no sugar or creamer.     Eye exam in the last 12 months? Yes, last exam was 2/20/2023    Foot exam: due  Urine Albumin:   Lab Results   Component Value Date    UMALCR  01/25/2023      Comment:      Unable to calculate, urine albumin and/or urine creatinine is outside detectable limits.  Microalbuminuria is defined as an albumin:creatinine ratio of 17 to 299 for males and 25 to 299 for females. A ratio of albumin:creatinine of 300 or higher is indicative of overt proteinuria.  Due to biologic variability, positive results should be confirmed by a second, first-morning random or 24-hour timed urine specimen. If there is discrepancy, a third specimen is recommended. When 2 out of 3 results are in the microalbuminuria range, this is evidence for incipient nephropathy and warrants increased efforts at glucose control, blood pressure control, and institution of therapy with an angiotensin-converting-enzyme (ACE) inhibitor (if the patient can tolerate it).        Lab Results   Component Value Date    A1C 7.4 (H) 02/16/2024     Hypertension   Monitoring/blood pressure control:    - Amlodipine 2.5 mg tablet once daily  - lisinopril 20 mg tablet once  "daily  - Furosemide 20 mg daily    Side effects: no complaints of dizzy spells, \"once in every great moon\". No complaints of fluid build up or a dry cough.        BP Readings from Last 3 Encounters:   02/16/24 115/75   12/03/23 114/72   10/27/23 116/73     Pulse Readings from Last 3 Encounters:   02/16/24 68   12/03/23 82   10/27/23 65     Hyperlipidemia   - Rosuvastatin 5 mg tablet once daily  No complaints of leg cramps, and has been taking every day since the middle of January.      Recent Labs   Lab Test 10/27/23  0940 07/24/23  1029   CHOL 232* 130   HDL 66 56   * 57   TRIG 97 86       Hypothyroidism    - Levothyroxine 112 mcg tablet once daily   Dose reduced from 125 mcg on 1/16/24   Discussed that it may take some time for a dose change in thyroid medications to have physical effects. Continue on same dose for now. Still complains of hair loss, but no complaints of general fatigue or tiredness. Multiple days per week doesn't sleep well.   TSH   Date Value Ref Range Status   01/18/2024 0.20 (L) 0.30 - 4.20 uIU/mL Final   01/19/2023 0.15 (L) 0.30 - 5.00 uIU/mL Final       Today's Vitals:   BP Readings from Last 1 Encounters:   02/16/24 115/75     Pulse Readings from Last 1 Encounters:   02/16/24 68     Wt Readings from Last 1 Encounters:   02/16/24 106 lb (48.1 kg)     Ht Readings from Last 1 Encounters:   02/16/24 5' 0.24\" (1.53 m)     Estimated body mass index is 20.54 kg/m  as calculated from the following:    Height as of an earlier encounter on 2/16/24: 5' 0.24\" (1.53 m).    Weight as of an earlier encounter on 2/16/24: 106 lb (48.1 kg).    Temp Readings from Last 1 Encounters:   02/16/24 97.3  F (36.3  C) (Tympanic)     ----------------      I spent 30 minutes with this patient today. Dr. Suazo (resident physician) was provided the recommendations above  in clinic today and Dr. aFgan is the authorizing prescriber for this visit through the pharmacist collaborative practice agreement. A copy of " the visit note was provided to the patient's provider(s).    A summary of these recommendations was given to the patient.    Cristóbal aTn, PD4 PharmD. Student    I was present with the pharmacy student who participated in the service and in the documentation of this note. I have verified the history, personally performed the medical decision making, and have verified the content of the note, which accurately reflects my assessment of the patient and the plan of care.   April Almazan, [facility], PharmD      Medication Therapy Recommendations  No medication therapy recommendations to display

## 2024-02-16 NOTE — PROGRESS NOTES
Assessment & Plan     Type 2 diabetes mellitus with stage 3b chronic kidney disease, with long-term current use of insulin (H)  Patient has a history of diabetes and is currently on Mounjaro, pioglitazone, and Jardiance.  Reports she has been checking her blood sugars at home and has not used her insulin in over a month.  Endorses compliance with her other medications.  Most recent A1c was 7.3.  No medication changes made today.  May go up on her Actos if control worsens.  - Albumin Random Urine Quantitative with Creat Ratio  - Hemoglobin  - Hemoglobin A1c: 7.4    Decreased thyroid stimulating hormone (TSH) level  Hypothyroidism, unspecified type  Patient has a history of hypothyroidism and is on levothyroxine 112 mcg.  This was recently decreased from 125 mcg after recent TSH was low.  Patient also reports hair loss, and this may be related.  Will continue with her current dose and recheck a TSH at her next visit.    Hypertension, benign essential, goal below 140/90  Patient's blood pressure has been well-controlled on her current regimen of amlodipine 2.5 mg and lisinopril 20 mg.  - Continue current medication regimen.    Atherosclerosis of native coronary artery of native heart with angina pectoris (H24)  Thoracic aortic aneurysm without rupture, unspecified part (H24)  Patient has a history of atherosclerosis, but is not on any aspirin.  When she took aspirin in the past it caused itching.  Will recheck lipid panel today, as patient recently restarted rosuvastatin about 6 weeks ago.  - Lipid panel reflex to direct LDL Fasting  - Continue taking rosuvastatin as prescribed    Recurrent major depression in full remission (H24)  Patient's depression is in remission.  PHQ today was 1    Need for hepatitis C screening test  - Hepatitis C Screen Reflex to HCV RNA Quant and Genotype    Return in about 2 months (around 4/16/2024) for Follow up, with me.    Augustin Varela is a 73 year old, presenting for the  "following health issues:  Diabetes (Dm fu)      2/16/2024    10:05 AM   Additional Questions   Roomed by em   Accompanied by self     Answers submitted by the patient for this visit:  Patient Health Questionnaire (Submitted on 2/16/2024)  If you checked off any problems, how difficult have these problems made it for you to do your work, take care of things at home, or get along with other people?: Not difficult at all  PHQ9 TOTAL SCORE: 1    Patient presents for diabetes, hypertension, and thyroid follow-up.  She has not taken her insulin in over a month, and she believes her sugars are well-controlled.  She endorses compliance with her current medication regimen.  Patient's only complaint is recent hair loss.  Of note, we just decreased her levothyroxine dose due to a low TSH.       ROS: 10 point ROS neg other than the symptoms noted above in the HPI.       Objective    /75 (BP Location: Left arm, Patient Position: Sitting, Cuff Size: Adult Regular)   Pulse 68   Temp 97.3  F (36.3  C) (Tympanic)   Resp 12   Ht 1.53 m (5' 0.24\")   Wt 48.1 kg (106 lb)   SpO2 100%   BMI 20.54 kg/m    Body mass index is 20.54 kg/m .  Physical Exam  Vitals reviewed.   Constitutional:       General: She is not in acute distress.     Appearance: Normal appearance. She is normal weight. She is not ill-appearing.   HENT:      Head: Normocephalic and atraumatic.      Right Ear: External ear normal.      Left Ear: External ear normal.   Eyes:      Extraocular Movements: Extraocular movements intact.      Conjunctiva/sclera: Conjunctivae normal.   Pulmonary:      Effort: Pulmonary effort is normal. No respiratory distress.   Neurological:      General: No focal deficit present.      Mental Status: She is alert and oriented to person, place, and time. Mental status is at baseline.   Psychiatric:         Mood and Affect: Mood normal.         Behavior: Behavior normal.         Thought Content: Thought content normal.         " Judgment: Judgment normal.        Results for orders placed or performed in visit on 02/16/24 (from the past 24 hour(s))   Albumin Random Urine Quantitative with Creat Ratio   Result Value Ref Range    Creatinine Urine mg/dL 55.3 mg/dL    Albumin Urine mg/L <12.0 mg/L    Albumin Urine mg/g Cr     Hemoglobin A1c   Result Value Ref Range    Hemoglobin A1C 7.4 (H) 0.0 - 5.6 %   Hemoglobin   Result Value Ref Range    Hemoglobin 15.3 11.7 - 15.7 g/dL           Signed Electronically by: Logan Suazo DO

## 2024-02-16 NOTE — PATIENT INSTRUCTIONS
Thank you for coming in to see us today!    - I will call you early next week about your lab results  - Call or message me if you cannot get in to see your previous cardiologist  - Continue taking all your medications as prescribed  - Follow up in 2 months    Logan Suazo, DO

## 2024-02-18 DIAGNOSIS — E03.9 ACQUIRED HYPOTHYROIDISM: ICD-10-CM

## 2024-02-20 RX ORDER — FUROSEMIDE 20 MG
TABLET ORAL
Qty: 90 TABLET | Refills: 3 | Status: SHIPPED | OUTPATIENT
Start: 2024-02-20 | End: 2024-08-15

## 2024-02-20 NOTE — TELEPHONE ENCOUNTER
Routing refill request to provider for review/approval because:   Medication indicated for associated diagnosis     Medication requested: FUROSEMIDE 20MG TABLETS   Last office visit: 02/16/2024  Future [facility] appointments: none  Medication last refilled: 12/6/2023   Last qualifying labs: none    Routed to provider due to failed RN protocol.     STEVEN Hamilton

## 2024-02-25 DIAGNOSIS — E11.9 DIABETES MELLITUS, TYPE II, INSULIN DEPENDENT (H): Chronic | ICD-10-CM

## 2024-02-25 DIAGNOSIS — Z79.4 DIABETES MELLITUS, TYPE II, INSULIN DEPENDENT (H): Chronic | ICD-10-CM

## 2024-03-28 NOTE — PROGRESS NOTES
Medication Therapy Management (MTM) Encounter    ASSESSMENT:                            Medication Adherence/Access: See below for considerations    Diabetes: Controlled, patient is at A1c goal of <8%. Priority today is to find Mounjaro at a different pharmacy so that she can restart on it.     Hypertension: Controlled, blood pressure is well under goal of <130/80 mmHg.     Hyperlipidemia: Stable    Hypothyroidism: Patient to follow up in April with Dr Suazo to recheck TSH.     PLAN:                            Prescription for Mounjaro 10 mg was sent to [place] [facility].  They will call you to set up payment.    Follow-up: 4 weeks     SUBJECTIVE/OBJECTIVE:                          Nichole Titus is a 74 year old female seen for a follow-up visit from 2/16/24.       Reason for visit: Diabetes follow up.    Allergies/ADRs: Reviewed in chart  Past Medical History: Reviewed in chart  Tobacco: She reports that she has never smoked. She has never used smokeless tobacco.  Social History     Tobacco Use    Smoking status: Never    Smokeless tobacco: Never   Substance Use Topics    Alcohol use: Yes     Alcohol/week: 1.7 standard drinks of alcohol     Comment: Alcoholic Drinks/day: once a week    Drug use: No       Medication Adherence/Access: no issues reported    Diabetes   Jardiance 10 mg tablet once daily   Mounjaro 10 mg once weekly - pharmacy out of so she has not had in about a week   Pioglitazone 30 mg once daily  Lantus 10 units once daily at bedtime - has had to take Lantus twice since not being on Mounjaro  Monitoring/blood sugar control:         -no complaints of any side effects including yeast infections, no nausea/vomiting or edema.     Current diabetes symptoms: no complaints  Diet/Exercise: walks every single day with her dog (about a mile to 2 miles every day; more in the summer).   - likes to have a big meal around 2 PM and a smaller meal at 4-5 PM, does not eat after 6 PM. Depends on breakfast.  "Has low-carb boost drinks for meal/snack supplements.   - Just drinks her coffee with a little bit of milk, no sugar or creamer.       Eye exam in the last 12 months? Yes- Date of last eye exam: 3/15/24,  Location: [facility]    Foot exam: up to date  Urine Albumin:   Lab Results   Component Value Date    UMALCR  02/16/2024      Comment:      Unable to calculate, urine albumin and/or urine creatinine is outside detectable limits.  Microalbuminuria is defined as an albumin:creatinine ratio of 17 to 299 for males and 25 to 299 for females. A ratio of albumin:creatinine of 300 or higher is indicative of overt proteinuria.  Due to biologic variability, positive results should be confirmed by a second, first-morning random or 24-hour timed urine specimen. If there is discrepancy, a third specimen is recommended. When 2 out of 3 results are in the microalbuminuria range, this is evidence for incipient nephropathy and warrants increased efforts at glucose control, blood pressure control, and institution of therapy with an angiotensin-converting-enzyme (ACE) inhibitor (if the patient can tolerate it).        Lab Results   Component Value Date    A1C 7.4 (H) 02/16/2024       Hypertension   Monitoring/blood pressure control:  - Amlodipine 2.5 mg tablet once daily  - lisinopril 20 mg tablet once daily  - Furosemide 20 mg daily    Side effects: no complaints of dizzy spells, \"once in every great moon\". No complaints of fluid build up or a dry cough. Haven't checked blood pressure in a while.         BP Readings from Last 3 Encounters:   02/16/24 115/75   12/03/23 114/72   10/27/23 116/73     Pulse Readings from Last 3 Encounters:   02/16/24 68   12/03/23 82   10/27/23 65       Hyperlipidemia   Rosuvastatin 5 mg tablet once daily  No complaints of leg cramps, and has been taking every day since the middle of January.      Recent Labs   Lab Test 02/16/24  1134 10/27/23  0940   CHOL 201* 232*   HDL 97 66   LDL 90 147*   TRIG 68 " 97       Hypothyroidism   Levothyroxine 112 mcg tablet once daily - dose reduced from 125 mcg on 1/16/24  Biotin supplement and shampoo for hair loss; patient thinks it is too soon to tell if this is helping     TSH   Date Value Ref Range Status   01/18/2024 0.20 (L) 0.30 - 4.20 uIU/mL Final   01/19/2023 0.15 (L) 0.30 - 5.00 uIU/mL Final       Today's Vitals: There were no vitals taken for this visit.  ----------------      I spent 10 minutes with this patient today. Dr. Suazo (resident physician) was provided the recommendations above  via routed note and Dr. Gomez is the authorizing prescriber for this visit through the pharmacist collaborative practice agreement.. A copy of the visit note was provided to the patient's provider(s).    A summary of these recommendations was sent via [employer].    April Almazan PharmD     Telemedicine Visit Details  Type of service:  Telephone visit  Start Time: 8:09 AM  End Time: 8:19 AM     Medication Therapy Recommendations  No medication therapy recommendations to display

## 2024-03-29 ENCOUNTER — TELEPHONE (OUTPATIENT)
Dept: FAMILY MEDICINE | Facility: CLINIC | Age: 74
End: 2024-03-29
Payer: COMMERCIAL

## 2024-03-29 ENCOUNTER — VIRTUAL VISIT (OUTPATIENT)
Dept: PHARMACY | Facility: CLINIC | Age: 74
End: 2024-03-29
Payer: COMMERCIAL

## 2024-03-29 DIAGNOSIS — E03.9 HYPOTHYROIDISM, UNSPECIFIED TYPE: ICD-10-CM

## 2024-03-29 DIAGNOSIS — I10 ESSENTIAL HYPERTENSION: Primary | ICD-10-CM

## 2024-03-29 DIAGNOSIS — E11.9 DIABETES MELLITUS, TYPE II, INSULIN DEPENDENT (H): ICD-10-CM

## 2024-03-29 DIAGNOSIS — E78.2 HYPERLIPEMIA, MIXED: ICD-10-CM

## 2024-03-29 DIAGNOSIS — Z79.4 DIABETES MELLITUS, TYPE II, INSULIN DEPENDENT (H): ICD-10-CM

## 2024-03-29 PROCEDURE — 99207 PR NO CHARGE LOS: CPT | Mod: 93 | Performed by: PHARMACIST

## 2024-03-29 RX ORDER — TIRZEPATIDE 10 MG/.5ML
10 INJECTION, SOLUTION SUBCUTANEOUS
Qty: 2 ML | Refills: 11 | Status: SHIPPED | OUTPATIENT
Start: 2024-03-29 | End: 2024-08-28

## 2024-03-29 NOTE — TELEPHONE ENCOUNTER
[facility] phone call message- general phone call:    Reason for call: Returning your call, she did not mention why    Return call needed: Yes    OK to leave a message on voice mail? Yes    Primary language: English      needed? No    Call taken on March 29, 2024 at 3:47 PM by Criss Chester

## 2024-03-29 NOTE — PATIENT INSTRUCTIONS
It was great to meet with you today.  You may receive a survey via email or text message in the next few days about your MTM pharmacist or physician.  We value your experience and would be very thankful for your time with providing feedback on our clinic survey.      Prescription for Mounjaro 10 mg was sent to [place] [facility].  They will call you to set up payment.

## 2024-04-26 ENCOUNTER — VIRTUAL VISIT (OUTPATIENT)
Dept: PHARMACY | Facility: CLINIC | Age: 74
End: 2024-04-26
Payer: COMMERCIAL

## 2024-04-26 DIAGNOSIS — I10 ESSENTIAL HYPERTENSION: ICD-10-CM

## 2024-04-26 DIAGNOSIS — E03.9 HYPOTHYROIDISM, UNSPECIFIED TYPE: ICD-10-CM

## 2024-04-26 DIAGNOSIS — Z79.4 DIABETES MELLITUS, TYPE II, INSULIN DEPENDENT (H): Primary | ICD-10-CM

## 2024-04-26 DIAGNOSIS — E78.2 HYPERLIPEMIA, MIXED: ICD-10-CM

## 2024-04-26 DIAGNOSIS — E11.9 DIABETES MELLITUS, TYPE II, INSULIN DEPENDENT (H): Primary | ICD-10-CM

## 2024-04-26 PROCEDURE — 99207 PR NO CHARGE LOS: CPT | Mod: 93 | Performed by: PHARMACIST

## 2024-04-26 NOTE — PROGRESS NOTES
"Medication Therapy Management (MTM) Encounter    ASSESSMENT:                            Medication Adherence/Access: No issues identified    Diabetes: Controlled, patient is at A1c goal of <8%.     Hypertension: Controlled, blood pressure is well under goal of <130/80 mmHg.     Hyperlipidemia: Stable    Hypothyroidism: Patient is due for repeat TSH.  Will follow with Dr Suazo next month.      PLAN:                            Continue current medication.    Follow-up: 3 weeks with Dr. Suazo.    SUBJECTIVE/OBJECTIVE:                          Nichole Titus is a 74 year old female called for a follow-up visit.       Reason for visit: Diabetes.    Allergies/ADRs: Reviewed in chart  Past Medical History: Reviewed in chart  Tobacco: She reports that she has never smoked. She has never used smokeless tobacco.  Social History     Tobacco Use    Smoking status: Never    Smokeless tobacco: Never   Substance Use Topics    Alcohol use: Yes     Alcohol/week: 1.7 standard drinks of alcohol     Comment: Alcoholic Drinks/day: once a week    Drug use: No       Medication Adherence/Access: no issues reported    Diabetes   Jardiance 10 mg tablet once daily   Mounjaro 10 mg once weekly finally received from [facility]; missed only one dose  Pioglitazone 30 mg once daily  Lantus 10 units once daily at bedtime if blood sugar > 300     Has had a \"bad week\" - has had to take Lantus three times in the past week.  Monitoring/blood sugar control:       -no complaints of any side effects including yeast infections, no nausea/vomiting or edema.     Current diabetes symptoms: no complaints  Diet/Exercise: walks every single day with her dog (about a mile to 2 miles every day; more in the summer).   - likes to have a big meal around 2 PM and a smaller meal at 4-5 PM, does not eat after 6 PM. Depends on breakfast. Has low-carb boost drinks for meal/snack supplements.   - Just drinks her coffee with a little bit of milk, no sugar or creamer.     " "  Eye exam in the last 12 months? Yes- Date of last eye exam: 3/15/24    Foot exam: up to date  Urine Albumin:   Lab Results   Component Value Date    UMALCR  02/16/2024      Comment:      Unable to calculate, urine albumin and/or urine creatinine is outside detectable limits.  Microalbuminuria is defined as an albumin:creatinine ratio of 17 to 299 for males and 25 to 299 for females. A ratio of albumin:creatinine of 300 or higher is indicative of overt proteinuria.  Due to biologic variability, positive results should be confirmed by a second, first-morning random or 24-hour timed urine specimen. If there is discrepancy, a third specimen is recommended. When 2 out of 3 results are in the microalbuminuria range, this is evidence for incipient nephropathy and warrants increased efforts at glucose control, blood pressure control, and institution of therapy with an angiotensin-converting-enzyme (ACE) inhibitor (if the patient can tolerate it).        Lab Results   Component Value Date    A1C 7.4 (H) 02/16/2024       Hypertension   Monitoring/blood pressure control:  Amlodipine 2.5 mg tablet once daily  lisinopril 20 mg tablet once daily  Furosemide 20 mg daily    Side effects: no complaints of dizzy spells, \"once in every great moon\". No complaints of fluid build up or a dry cough. Haven't checked blood pressure in a while.         BP Readings from Last 3 Encounters:   02/16/24 115/75   12/03/23 114/72   10/27/23 116/73     Pulse Readings from Last 3 Encounters:   02/16/24 68   12/03/23 82   10/27/23 65       Hyperlipidemia   Rosuvastatin 5 mg tablet once daily  No complaints of leg cramps, and has been taking every day since the middle of January.        Recent Labs   Lab Test 02/16/24  1134 10/27/23  0940   CHOL 201* 232*   HDL 97 66   LDL 90 147*   TRIG 68 97       Hypothyroidism   Levothyroxine 112 mcg tablet once daily - dose reduced from 125 mcg on 1/16/24  Biotin supplement and shampoo for hair loss  Otherwise " no symptoms.         TSH   Date Value Ref Range Status   01/18/2024 0.20 (L) 0.30 - 4.20 uIU/mL Final   01/19/2023 0.15 (L) 0.30 - 5.00 uIU/mL Final         Today's Vitals: There were no vitals taken for this visit.  ----------------      I spent 10 minutes with this patient today. Dr. Suazo (resident physician) was provided the recommendations above  via routed note and Dr. Alvarez is the authorizing prescriber for this visit through the pharmacist collaborative practice agreement.. A copy of the visit note was provided to the patient's provider(s).    A summary of these recommendations was sent via [employer].    April Almazan PharmD     Telemedicine Visit Details  Type of service:  Telephone visit  Start Time: 8:06 AM  End Time: 8:14 AM     Medication Therapy Recommendations  No medication therapy recommendations to display

## 2024-05-17 ENCOUNTER — OFFICE VISIT (OUTPATIENT)
Dept: FAMILY MEDICINE | Facility: CLINIC | Age: 74
End: 2024-05-17
Payer: COMMERCIAL

## 2024-05-17 VITALS
HEART RATE: 69 BPM | WEIGHT: 110.8 LBS | SYSTOLIC BLOOD PRESSURE: 93 MMHG | DIASTOLIC BLOOD PRESSURE: 69 MMHG | HEIGHT: 66 IN | RESPIRATION RATE: 20 BRPM | TEMPERATURE: 97.5 F | BODY MASS INDEX: 17.81 KG/M2 | OXYGEN SATURATION: 99 %

## 2024-05-17 DIAGNOSIS — Z12.11 SCREEN FOR COLON CANCER: ICD-10-CM

## 2024-05-17 DIAGNOSIS — N18.32 TYPE 2 DIABETES MELLITUS WITH STAGE 3B CHRONIC KIDNEY DISEASE, WITH LONG-TERM CURRENT USE OF INSULIN (H): ICD-10-CM

## 2024-05-17 DIAGNOSIS — I10 HYPERTENSION, BENIGN ESSENTIAL, GOAL BELOW 140/90: Chronic | ICD-10-CM

## 2024-05-17 DIAGNOSIS — Z79.4 TYPE 2 DIABETES MELLITUS WITH STAGE 3B CHRONIC KIDNEY DISEASE, WITH LONG-TERM CURRENT USE OF INSULIN (H): ICD-10-CM

## 2024-05-17 DIAGNOSIS — E03.9 HYPOTHYROIDISM, UNSPECIFIED TYPE: Primary | ICD-10-CM

## 2024-05-17 DIAGNOSIS — J34.9 SINUS PROBLEM: ICD-10-CM

## 2024-05-17 DIAGNOSIS — E11.22 TYPE 2 DIABETES MELLITUS WITH STAGE 3B CHRONIC KIDNEY DISEASE, WITH LONG-TERM CURRENT USE OF INSULIN (H): ICD-10-CM

## 2024-05-17 DIAGNOSIS — Z12.31 VISIT FOR SCREENING MAMMOGRAM: ICD-10-CM

## 2024-05-17 PROCEDURE — 84443 ASSAY THYROID STIM HORMONE: CPT

## 2024-05-17 PROCEDURE — 36415 COLL VENOUS BLD VENIPUNCTURE: CPT

## 2024-05-17 PROCEDURE — 99214 OFFICE O/P EST MOD 30 MIN: CPT | Mod: GC

## 2024-05-17 RX ORDER — RESPIRATORY SYNCYTIAL VIRUS VACCINE 120MCG/0.5
0.5 KIT INTRAMUSCULAR ONCE
Qty: 1 EACH | Refills: 0 | Status: CANCELLED | OUTPATIENT
Start: 2024-05-17 | End: 2024-05-17

## 2024-05-17 RX ORDER — OLOPATADINE HYDROCHLORIDE 2 MG/ML
1 SOLUTION/ DROPS OPHTHALMIC DAILY
COMMUNITY
Start: 2024-05-11

## 2024-05-17 RX ORDER — LEVOTHYROXINE SODIUM 112 UG/1
112 TABLET ORAL DAILY
Qty: 30 TABLET | Refills: 1 | Status: SHIPPED | OUTPATIENT
Start: 2024-05-17 | End: 2024-05-20

## 2024-05-17 RX ORDER — LISINOPRIL 20 MG/1
20 TABLET ORAL DAILY
Qty: 90 TABLET | Refills: 3 | Status: SHIPPED | OUTPATIENT
Start: 2024-05-17

## 2024-05-17 NOTE — PROGRESS NOTES
Preventive Care Visit  [facility]  Logan Suazo DO, Family Medicine  May 17, 2024    Augustin Varela is a 74 year old, presenting for the following:  Sinus Problem (Sinus issues - 2 wks pressure on her eyes - and sinuses ), pulse (Concern of her low pulse rate - not feeling great. - already talked with cardilogist ), and Medication Request (Thyroid med ck - needs to refilled - has not been refilled. And meds - pls med ck )        5/17/2024     8:59 AM   Additional Questions   Roomed by CHINO austin MA   Accompanied by Self         5/17/2024    Information    services provided? No       Health Care Directive  Patient does not have a Health Care Directive or Living Will: Discussed advance care planning with patient; information given to patient to review.    HPI       No data to display                   No data to display                   No data to display                      No data to display                   No data to display                   No data to display                     No data to display              Today's PHQ-9 Score:       2/16/2024    10:00 AM   PHQ-9 SCORE   PHQ-9 Total Score MyChart 1 (Minimal depression)   PHQ-9 Total Score 1            No data to display              Social History     Tobacco Use    Smoking status: Never    Smokeless tobacco: Never   Substance Use Topics    Alcohol use: Yes     Alcohol/week: 1.7 standard drinks of alcohol     Comment: Alcoholic Drinks/day: once a week    Drug use: No       ASCVD Risk   The ASCVD Risk score (Alma DK, et al., 2019) failed to calculate for the following reasons:    The patient has a prior MI or stroke diagnosis    Reviewed and updated as needed this visit by Provider                  Current providers sharing in care for this patient include:  Patient Care Team:  Logan Suazo DO as PCP - General (Student in organized health care education/training program)  April Almazan, [facility] as  "Assigned MTM Pharmacist  Logan Suazo DO as Assigned PCP  April Almazan RP as Pharmacist (Pharmacist)  Elis Wade RP as Pharmacist (Pharmacist)  Clinic - [facility]    The following health maintenance items are reviewed in Epic and correct as of today:  Health Maintenance   Topic Date Due    DEPRESSION ACTION PLAN  Never done    RSV VACCINE (Pregnancy & 60+) (1 - 1-dose 60+ series) Never done    COLORECTAL CANCER SCREENING  02/15/2017    MEDICARE ANNUAL WELLNESS VISIT  11/29/2022    MAMMO SCREENING  05/13/2023    DIABETIC FOOT EXAM  01/25/2024    EYE EXAM  02/20/2024    COVID-19 Vaccine (8 - 2023-24 season) 06/16/2024    BMP  07/24/2024    A1C  08/16/2024    MICROALBUMIN  08/16/2024    PHQ-9  08/16/2024    LIPID  02/16/2025    HEMOGLOBIN  02/16/2025    TSH W/FREE T4 REFLEX  05/17/2025    FALL RISK ASSESSMENT  05/17/2025    DTAP/TDAP/TD IMMUNIZATION (2 - Td or Tdap) 05/04/2026    ADVANCE CARE PLANNING  05/17/2029    DEXA  03/30/2032    PARATHYROID  Completed    PHOSPHORUS  Completed    HEPATITIS C SCREENING  Completed    INFLUENZA VACCINE  Completed    Pneumococcal Vaccine: 65+ Years  Completed    URINALYSIS  Completed    ALK PHOS  Completed    ZOSTER IMMUNIZATION  Completed    IPV IMMUNIZATION  Aged Out    HPV IMMUNIZATION  Aged Out    MENINGITIS IMMUNIZATION  Aged Out    RSV MONOCLONAL ANTIBODY  Aged Out        Objective    Exam  BP 93/69   Pulse 69   Temp 97.5  F (36.4  C) (Oral)   Resp 20   Ht 1.676 m (5' 6\")   Wt 50.3 kg (110 lb 12.8 oz)   SpO2 99%   BMI 17.88 kg/m     Estimated body mass index is 17.88 kg/m  as calculated from the following:    Height as of this encounter: 1.676 m (5' 6\").    Weight as of this encounter: 50.3 kg (110 lb 12.8 oz).    Physical Exam         5/17/2024   Mini Cog   Clock Draw Score 2 Normal   3 Item Recall 3 objects recalled   Mini Cog Total Score 5          Signed Electronically by: Logan Suazo DO    "

## 2024-05-17 NOTE — PATIENT INSTRUCTIONS
Thank you for coming in to see us today!    - Given your low blood pressure, I stopped the amlodipine  - Schedule your mammogram and return your FIT test at your earliest convenience  - Continue taking all your other medications as prescribed  - Follow up in 1 month with pharmacist and BP check    Logan Suazo, DO

## 2024-05-17 NOTE — PROGRESS NOTES
Assessment & Plan     Hypothyroidism, unspecified type  Presents today to have her TSH rechecked.  We will recheck it today, however, patient has been out of her levothyroxine for about a week and a half.  This was refilled today.  - levothyroxine (SYNTHROID/LEVOTHROID) 112 MCG tablet  Dispense: 30 tablet; Refill: 1  - TSH    Hypertension, benign essential, goal below 140/90  Patient has a history of hypertension, but her blood pressure is have been well-controlled.  Today, her blood pressure is 93/69.  She brought in a list of blood pressures that she has been taking for the past couple weeks and she has had frequent readings in the 90s systolic.  We will discontinue her amlodipine as her blood pressure is a little bit on the low side.  - lisinopril (ZESTRIL) 20 MG tablet  Dispense: 90 tablet; Refill: 3  - Discontinue amlodipine    Type 2 diabetes mellitus with stage 3b chronic kidney disease, with long-term current use of insulin (H)  Patient has a history of type 2 diabetes that has been well-controlled with A1c less than 8.  No changes to her diabetes medication regimen were made today.    Sinus problem  Patient reports a recent history of left-sided sinus pressure and running eyes.  Reports she was recently seen by an ophthalmologist who prescribed an eyedrop that is greatly improved her eye symptoms and mildly improved her sinus pressure.  Was told by the ophthalmologist that she is having allergies.  She was also told that if this drop is ineffective, to call the ophthalmologist and they will prescribe a different eyedrop.  No further management is necessary on our end as this is currently being treated by the ophthalmologist.  If both drops are unsuccessful and patient is still having symptoms, I encouraged her to return.    Screen for colon cancer  Patient agreed to a fit test.  States she cannot have a colonoscopy because it makes her nauseous.  - Fecal colorectal cancer screen FIT - Future  "(S+30)    Visit for screening mammogram  Patient was agreeable to mammogram being ordered today.  - MA SCREENING DIGITAL BILAT - Future  (s+30)    Return in about 1 month (around 6/17/2024) for PharmD Visit.    Subjective   Nichole is a 74 year old, presenting for the following health issues:  Sinus Problem (Sinus issues - 2 wks pressure on her eyes - and sinuses ), pulse (Concern of her low pulse rate - not feeling great. - already talked with cardilogist ), and Medication Request (Thyroid med ck - needs to refilled - has not been refilled. And meds - pls med ck )      5/17/2024     8:59 AM   Additional Questions   Roomed by E. her MA   Accompanied by Self         5/17/2024    Information    services provided? No     Patient presents to have her TSH rechecked.  She notes that she has been out of her levothyroxine for a week and a half.  She has also been checking her blood pressures at home and notes that they have occasionally been low.  He has been taking all her other medications as prescribed.  Patient also complains of some symptoms consistent with sinusitis.  She has recently seen by an ophthalmologist who recommended an eyedrop because they believe she is having allergy symptoms.  She reports her symptoms have slightly improved since starting this eyedrop, and she received instructions to call the ophthalmologist for different eyedrop if this 1 was unsuccessful.  I do not believe we need to add any medications to manage this, as it is currently being managed by the ophthalmologist.       ROS: 10 point ROS neg other than the symptoms noted above in the HPI.       Objective    BP 93/69   Pulse 69   Temp 97.5  F (36.4  C) (Oral)   Resp 20   Ht 1.676 m (5' 6\")   Wt 50.3 kg (110 lb 12.8 oz)   SpO2 99%   BMI 17.88 kg/m    Body mass index is 17.88 kg/m .  Physical Exam  Vitals reviewed.   Constitutional:       General: She is not in acute distress.     Appearance: She is not " toxic-appearing.   HENT:      Head: Normocephalic and atraumatic.      Right Ear: External ear normal.      Left Ear: External ear normal.      Nose:      Comments: Mild tenderness to palpation over the left paranasal sinus  Eyes:      Extraocular Movements: Extraocular movements intact.      Conjunctiva/sclera: Conjunctivae normal.   Cardiovascular:      Rate and Rhythm: Normal rate and regular rhythm.      Heart sounds: Murmur heard.   Pulmonary:      Effort: Pulmonary effort is normal. No respiratory distress.      Breath sounds: Normal breath sounds. No stridor. No wheezing, rhonchi or rales.   Skin:     General: Skin is warm and dry.   Neurological:      General: No focal deficit present.      Mental Status: She is alert and oriented to person, place, and time. Mental status is at baseline.   Psychiatric:         Mood and Affect: Mood normal.         Behavior: Behavior normal.         Thought Content: Thought content normal.         Judgment: Judgment normal.              Signed Electronically by: Logan Suazo DO

## 2024-05-17 NOTE — Clinical Note
I redrew a TSH as recommended per your last note. However, she has been out of her levothyroxine for the past week and a half. I refilled her levothyroxine at the 112 mcg dose as was last prescribed.  I also stopped her amlodipine because she was having some low blood pressures at home and here in clinic. I do not see a follow up scheduled with you, but I think a virtual visit in about 1 month would suffice. She is good about checking her BP at home.

## 2024-05-17 NOTE — PROGRESS NOTES
Preceptor Attestation:  I discussed the patient with the resident and evaluated the patient in person. I have verified the content of the note, which accurately reflects my assessment of the patient and the plan of care.  Supervising Physician:  Zo Fan MD.

## 2024-05-18 LAB — TSH SERPL DL<=0.005 MIU/L-ACNC: 226 UIU/ML (ref 0.3–4.2)

## 2024-05-20 DIAGNOSIS — E03.9 HYPOTHYROIDISM, UNSPECIFIED TYPE: ICD-10-CM

## 2024-05-20 RX ORDER — LEVOTHYROXINE SODIUM 112 UG/1
112 TABLET ORAL DAILY
Qty: 30 TABLET | Refills: 1 | Status: SHIPPED | OUTPATIENT
Start: 2024-05-20 | End: 2024-05-21

## 2024-05-21 PROCEDURE — 82274 ASSAY TEST FOR BLOOD FECAL: CPT

## 2024-05-21 RX ORDER — LEVOTHYROXINE SODIUM 112 UG/1
112 TABLET ORAL DAILY
Qty: 90 TABLET | Refills: 3 | Status: SHIPPED | OUTPATIENT
Start: 2024-05-21

## 2024-05-23 LAB — HEMOCCULT STL QL IA: NEGATIVE

## 2024-06-07 ENCOUNTER — DOCUMENTATION ONLY (OUTPATIENT)
Dept: GERIATRICS | Facility: CLINIC | Age: 74
End: 2024-06-07
Payer: COMMERCIAL

## 2024-06-10 ENCOUNTER — TRANSITIONAL CARE UNIT VISIT (OUTPATIENT)
Dept: GERIATRICS | Facility: CLINIC | Age: 74
End: 2024-06-10
Payer: COMMERCIAL

## 2024-06-10 VITALS
SYSTOLIC BLOOD PRESSURE: 153 MMHG | TEMPERATURE: 97.5 F | HEART RATE: 65 BPM | DIASTOLIC BLOOD PRESSURE: 79 MMHG | OXYGEN SATURATION: 100 % | BODY MASS INDEX: 17.93 KG/M2 | WEIGHT: 111.1 LBS

## 2024-06-10 DIAGNOSIS — S32.010D COMPRESSION FRACTURE OF L1 VERTEBRA WITH ROUTINE HEALING, SUBSEQUENT ENCOUNTER: Primary | ICD-10-CM

## 2024-06-10 DIAGNOSIS — S32.010S COMPRESSION FRACTURE OF L1 VERTEBRA, SEQUELA: Primary | ICD-10-CM

## 2024-06-10 DIAGNOSIS — E11.22 TYPE 2 DIABETES MELLITUS WITH STAGE 3B CHRONIC KIDNEY DISEASE, WITH LONG-TERM CURRENT USE OF INSULIN (H): ICD-10-CM

## 2024-06-10 DIAGNOSIS — Z79.4 TYPE 2 DIABETES MELLITUS WITH STAGE 3B CHRONIC KIDNEY DISEASE, WITH LONG-TERM CURRENT USE OF INSULIN (H): ICD-10-CM

## 2024-06-10 DIAGNOSIS — I10 HYPERTENSION, UNSPECIFIED TYPE: ICD-10-CM

## 2024-06-10 DIAGNOSIS — R52 GENERALIZED PAIN: ICD-10-CM

## 2024-06-10 DIAGNOSIS — N18.32 TYPE 2 DIABETES MELLITUS WITH STAGE 3B CHRONIC KIDNEY DISEASE, WITH LONG-TERM CURRENT USE OF INSULIN (H): ICD-10-CM

## 2024-06-10 PROCEDURE — 99309 SBSQ NF CARE MODERATE MDM 30: CPT | Performed by: NURSE PRACTITIONER

## 2024-06-10 RX ORDER — OXYCODONE HYDROCHLORIDE 5 MG/1
5 TABLET ORAL EVERY 4 HOURS PRN
Qty: 60 TABLET | Refills: 0 | Status: SHIPPED | OUTPATIENT
Start: 2024-06-10 | End: 2024-08-15

## 2024-06-10 RX ORDER — CETIRIZINE HYDROCHLORIDE 10 MG/1
10 TABLET ORAL DAILY
COMMUNITY

## 2024-06-10 RX ORDER — MECLIZINE HYDROCHLORIDE 25 MG/1
25 TABLET ORAL 3 TIMES DAILY PRN
COMMUNITY
End: 2024-08-15

## 2024-06-10 RX ORDER — METHOCARBAMOL 500 MG/1
500 TABLET, FILM COATED ORAL 3 TIMES DAILY
Status: SHIPPED | DISCHARGE
Start: 2024-06-10 | End: 2024-08-15

## 2024-06-10 NOTE — LETTER
6/10/2024      Nichole Titus  [address]  [facility] [address]        [facility] GERIATRICS    PRIMARY CARE PROVIDER AND CLINIC:  Logan Suazo DO, [address] [facility] [address]  Chief Complaint   Patient presents with     Hospital F/U      [place] Medical Record Number:  [id number]  Place of Service where encounter took place: [facility]     Nichole Titus  is a 74 year old  (1950), admitted to the above facility from  [facility]. Hospital stay 6/4/2024 through 6/7/2024..   HPI:    Nichole Titus is a(n) 74 y.o. with a history of aortic stenosis s/p bioprosthetic aortic valve replacement in 2018, nonobstructive CAD, diabetes mellitus type 2, CKD stage III, HTN, hypothyroidism, peripheral vertigo, mechanical falls and dyslipidemia , who was admitted on 6/4-6/7/24 at [facility] in [place] following a mechanical fall r/t walking her dog and found to have compression fractures.     CT of the abdomen and pelvis without any acute abdominal findings. CT of the lumbar spine revealed an acute to subacute L1 compression fracture with mild to moderate spinal stenosis. Also noted were L3, L4, T12 and L5 compression fractures, not thought to be acute. Patient admitted for pain management, neurosurgical evaluation for brace placement and probable TCU placement.    HPI: Today the patient is feeling much improved from when she started therapy. She is able to ambulate and maintain independence wearing a fitted back brace. She denies any weakness, numbness/ tingling, or worsening pain of her low back and BLE. She denies red flag symptoms of cauda equina like saddle anesthesia and incontinence. She overall states that her pain is mild-moderate, but she would like to limit the amount of opioid she takes. She would like a muscle relaxer to be added for pain control. She has no other concerns at this time. She plans to return to home after her stay at the facility.    Discussed  with patient her recent thyroid labs and A1C from earlier this year- she states she had a lapse in medications that explains her TSH levels and she has a plan with her primary to better control her diabetes.      CODE STATUS/ADVANCE DIRECTIVES DISCUSSION:  Special- CPR but no intubation  ALLERGIES:   Allergies   Allergen Reactions     Amoxicillin Hives     Aspirin Itching     Codeine Itching     Metformin Diarrhea     Prednisone Itching     Simvastatin      Other reaction(s): *Unknown     Sulfa Antibiotics Itching and Hives     Adhesive Tape Rash     EKG stickers and Tegaderm       PAST MEDICAL HISTORY:   Past Medical History:   Diagnosis Date     Chronic kidney disease      Chronic kidney disease, stage III (moderate) (H)      Diabetes (H)      Hypertension       PAST SURGICAL HISTORY:   has a past surgical history that includes appendectomy; Cholecystectomy; Hysterectomy (1989); Oophorectomy (Bilateral, 1989); Laryngoscopy (N/A, 1/26/2016); tonsillectomy & adenoidectomy; and Nasal Fracture Surgery.  FAMILY HISTORY: family history includes Breast Cancer (age of onset: 62.00) in her mother; Diabetes in her brother and father; Fibromyalgia in her brother; Glaucoma in her father; No Known Problems in her daughter.  SOCIAL HISTORY:   reports that she has never smoked. She has never used smokeless tobacco. She reports current alcohol use of about 1.7 standard drinks of alcohol per week. She reports that she does not use drugs.  Patient's living condition: lives alone    Post Discharge Medication Reconciliation Status:   MED REC REQUIRED  Post Medication Reconciliation Status:  Discharge medications reconciled, continue medications without change       Current Outpatient Medications   Medication Sig Dispense Refill     cetirizine (ZYRTEC) 10 MG tablet Take 10 mg by mouth daily       meclizine (ANTIVERT) 25 MG tablet Take 25 mg by mouth 3 times daily as needed for dizziness       acetaminophen (TYLENOL) 500 MG tablet  [ACETAMINOPHEN (TYLENOL) 500 MG TABLET] Take 500 mg by mouth every 6 (six) hours as needed for pain.       albuterol (PROAIR HFA/PROVENTIL HFA/VENTOLIN HFA) 108 (90 Base) MCG/ACT inhaler Inhale 2 puffs into the lungs every 6 hours as needed 18 g 1     alendronate (FOSAMAX) 70 MG tablet Take 1 tablet (70 mg) by mouth every 7 days 12 tablet 3     blood sugar diagnostic (CONTOUR NEXT STRIPS) Strp [BLOOD SUGAR DIAGNOSTIC (CONTOUR NEXT STRIPS) STRP] Check blood sugars up to 6X/day 180 strip 6     clotrimazole (LOTRIMIN) 1 % vaginal cream Place 1 Applicatorful vaginally at bedtime 45 g 3     clotrimazole-betamethasone (LOTRISONE) 1-0.05 % external cream Apply topically 2 times daily 30 g 1     Continuous Blood Gluc  (FREESTYLE JAVAN 2 READER) GUILLAUME USE TO READ BLOOD SUGAR       Continuous Blood Gluc Sensor (FREESTYLE JAVAN 2 SENSOR) MISC USE 1 EACH EVERY 14 DAYS. USE 1 SENSORE EVERY 14 DAYS.USE TO READ BLOOD SUGARS PER 'S INSTRUCTIONS. 6 each 4     empagliflozin (JARDIANCE) 10 MG TABS tablet Take 1 tablet (10 mg) by mouth daily 90 tablet 3     furosemide (LASIX) 20 MG tablet TAKE 1 TABLET(20 MG) BY MOUTH DAILY 90 tablet 3     gabapentin (NEURONTIN) 100 MG capsule Take 3 capsules (300 mg) by mouth 2 times daily 180 capsule 3     gabapentin (NEURONTIN) 300 MG capsule Take 1 capsule (300 mg) by mouth 2 times daily 180 capsule 3     levothyroxine (SYNTHROID/LEVOTHROID) 112 MCG tablet TAKE 1 TABLET(112 MCG) BY MOUTH DAILY 90 tablet 3     lisinopril (ZESTRIL) 20 MG tablet Take 1 tablet (20 mg) by mouth daily 90 tablet 3     olopatadine (PATADAY) 0.2 % ophthalmic solution Place 1 drop into both eyes daily       ondansetron (ZOFRAN) 4 MG tablet Take 1 tablet (4 mg) by mouth every 8 hours as needed for nausea 40 tablet 1     oxyCODONE (ROXICODONE) 5 MG tablet Take 1 tablet (5 mg) by mouth every 4 hours as needed for pain 60 tablet 0     pioglitazone (ACTOS) 30 MG tablet Take 1 tablet (30 mg) by mouth daily 90  tablet 3     reason aspirin not prescribed, intentional, Please choose reason not prescribed from choices below.       rosuvastatin (CRESTOR) 5 MG tablet Take 1 tablet (5 mg) by mouth daily 90 tablet 3     tirzepatide (MOUNJARO) 10 MG/0.5ML pen Inject 10 mg Subcutaneous every 7 days 2 mL 11     vitamin D3 (CHOLECALCIFEROL) 50 mcg (2000 units) tablet Take 1 tablet (50 mcg) by mouth daily 90 tablet 3     No current facility-administered medications for this visit.     ROS:  4 point ROS including Respiratory, CV, GI and , other than that noted in the HPI,  is negative    Vitals:  BP (!) 153/79   Pulse 65   Temp 97.5  F (36.4  C)   Wt 50.4 kg (111 lb 1.6 oz)   SpO2 100%   BMI 17.93 kg/m    BP 6/8 124/76, 6/7 127/65 (per TCU record)   -249 AM, 140-190 PM  Exam:  GENERAL APPEARANCE:  Alert, in no distress, appears healthy  RESP:  lungs clear to auscultation , no respiratory distress  CV:  regular rate and rhythm, no murmur, rub, or gallop, no edema, +2 pedal pulses  M/S:   +5 strength against resistance of BLE, negative straight leg raise of BLE  NEURO:   Examination of sensation by touch normal    Lab/Diagnostic data:  Recent labs in [facility] reviewed by me today.     ASSESSMENT/PLAN:  1. Compression fracture of L1 vertebra with routine healing, subsequent encounter  Patient is s/p hospitalization for new L1 compression fracture d/t a mechanical fall. The patient shows stable progress and no signs of cauda equina or worsening spinal cord stenosis. She should continue to wear her brace when OOB, participate with therapies, and f/up with [facility] in 1-2 weeks.    2. Generalized pain  Patient reports that her back pain is improving but still mild-moderate. She would like to reduce the amount of oxycodone she requires by adding a muscle relaxer which has helped her in the past. She was educated on the potential sedative properties of Robaxin but was reassured that sedation with continued opioid use is more  likely.     -Robaxin 500 mg PO TID prn for pain    3. Type 2 diabetes mellitus with stage 3b chronic kidney disease, with long-term current use of insulin (H)  The patient's DM is currently poorly controlled considering her most recent A1C was 7.4 and her recent Bgs have been up to 250 in the afternoon (measured via CGM). An appropriate A1C goal for her would be less than 7 as she was at 6.8 10 months ago. She does have a plan to see her primary within the month and will discuss her DM management at that time. She prefers to manager her glucose with diet and exercise over medications.    4. Hypertension, unspecified type  Controlled. The patient did have one high BP reading but her other readings have generally been <140/<90 which is appropriate for her age. She should continue her current medications for BP.      Orders:  Robaxin 500 mg PO TID PRN for pain    Note by Jose Higgins DNP Student     I was present with the student who participated in the service and in the documentation of the note. I have verified the history and personally performed the physical exam and medical decision-making. I agree with the assessment and plan of care as documented in the note.  Electronically signed by:   PEDRO Sena CNP on 6/10/2024 at 4:03 PM                    Sincerely,        PEDRO Sena CNP

## 2024-06-10 NOTE — PROGRESS NOTES
[facility] GERIATRICS    PRIMARY CARE PROVIDER AND CLINIC:  Logan Suazo DO, [address] [facility] [address]  Chief Complaint   Patient presents with    Hospital F/U      [place] Medical Record Number:  [id number]  Place of Service where encounter took place: [facility]     Nichole Titus  is a 74 year old  (1950), admitted to the above facility from  [facility]. Hospital stay 6/4/2024 through 6/7/2024..   HPI:    Nichole Titus is a(n) 74 y.o. with a history of aortic stenosis s/p bioprosthetic aortic valve replacement in 2018, nonobstructive CAD, diabetes mellitus type 2, CKD stage III, HTN, hypothyroidism, peripheral vertigo, mechanical falls and dyslipidemia , who was admitted on 6/4-6/7/24 at [facility] in [place] following a mechanical fall r/t walking her dog and found to have compression fractures.     CT of the abdomen and pelvis without any acute abdominal findings. CT of the lumbar spine revealed an acute to subacute L1 compression fracture with mild to moderate spinal stenosis. Also noted were L3, L4, T12 and L5 compression fractures, not thought to be acute. Patient admitted for pain management, neurosurgical evaluation for brace placement and probable TCU placement.    HPI: Today the patient is feeling much improved from when she started therapy. She is able to ambulate and maintain independence wearing a fitted back brace. She denies any weakness, numbness/ tingling, or worsening pain of her low back and BLE. She denies red flag symptoms of cauda equina like saddle anesthesia and incontinence. She overall states that her pain is mild-moderate, but she would like to limit the amount of opioid she takes. She would like a muscle relaxer to be added for pain control. She has no other concerns at this time. She plans to return to home after her stay at the facility.    Discussed with patient her recent thyroid labs and A1C from earlier this year- she states she  had a lapse in medications that explains her TSH levels and she has a plan with her primary to better control her diabetes.      CODE STATUS/ADVANCE DIRECTIVES DISCUSSION:  Special- CPR but no intubation  ALLERGIES:   Allergies   Allergen Reactions    Amoxicillin Hives    Aspirin Itching    Codeine Itching    Metformin Diarrhea    Prednisone Itching    Simvastatin      Other reaction(s): *Unknown    Sulfa Antibiotics Itching and Hives    Adhesive Tape Rash     EKG stickers and Tegaderm       PAST MEDICAL HISTORY:   Past Medical History:   Diagnosis Date    Chronic kidney disease     Chronic kidney disease, stage III (moderate) (H)     Diabetes (H)     Hypertension       PAST SURGICAL HISTORY:   has a past surgical history that includes appendectomy; Cholecystectomy; Hysterectomy (1989); Oophorectomy (Bilateral, 1989); Laryngoscopy (N/A, 1/26/2016); tonsillectomy & adenoidectomy; and Nasal Fracture Surgery.  FAMILY HISTORY: family history includes Breast Cancer (age of onset: 62.00) in her mother; Diabetes in her brother and father; Fibromyalgia in her brother; Glaucoma in her father; No Known Problems in her daughter.  SOCIAL HISTORY:   reports that she has never smoked. She has never used smokeless tobacco. She reports current alcohol use of about 1.7 standard drinks of alcohol per week. She reports that she does not use drugs.  Patient's living condition: lives alone    Post Discharge Medication Reconciliation Status:   MED REC REQUIRED  Post Medication Reconciliation Status:  Discharge medications reconciled, continue medications without change       Current Outpatient Medications   Medication Sig Dispense Refill    cetirizine (ZYRTEC) 10 MG tablet Take 10 mg by mouth daily      meclizine (ANTIVERT) 25 MG tablet Take 25 mg by mouth 3 times daily as needed for dizziness      acetaminophen (TYLENOL) 500 MG tablet [ACETAMINOPHEN (TYLENOL) 500 MG TABLET] Take 500 mg by mouth every 6 (six) hours as needed for pain.       albuterol (PROAIR HFA/PROVENTIL HFA/VENTOLIN HFA) 108 (90 Base) MCG/ACT inhaler Inhale 2 puffs into the lungs every 6 hours as needed 18 g 1    alendronate (FOSAMAX) 70 MG tablet Take 1 tablet (70 mg) by mouth every 7 days 12 tablet 3    blood sugar diagnostic (CONTOUR NEXT STRIPS) Strp [BLOOD SUGAR DIAGNOSTIC (CONTOUR NEXT STRIPS) STRP] Check blood sugars up to 6X/day 180 strip 6    clotrimazole (LOTRIMIN) 1 % vaginal cream Place 1 Applicatorful vaginally at bedtime 45 g 3    clotrimazole-betamethasone (LOTRISONE) 1-0.05 % external cream Apply topically 2 times daily 30 g 1    Continuous Blood Gluc  (FREESTYLE JAVAN 2 READER) GUILLAUME USE TO READ BLOOD SUGAR      Continuous Blood Gluc Sensor (FREESTYLE JAVAN 2 SENSOR) MISC USE 1 EACH EVERY 14 DAYS. USE 1 SENSORE EVERY 14 DAYS.USE TO READ BLOOD SUGARS PER 'S INSTRUCTIONS. 6 each 4    empagliflozin (JARDIANCE) 10 MG TABS tablet Take 1 tablet (10 mg) by mouth daily 90 tablet 3    furosemide (LASIX) 20 MG tablet TAKE 1 TABLET(20 MG) BY MOUTH DAILY 90 tablet 3    gabapentin (NEURONTIN) 100 MG capsule Take 3 capsules (300 mg) by mouth 2 times daily 180 capsule 3    gabapentin (NEURONTIN) 300 MG capsule Take 1 capsule (300 mg) by mouth 2 times daily 180 capsule 3    levothyroxine (SYNTHROID/LEVOTHROID) 112 MCG tablet TAKE 1 TABLET(112 MCG) BY MOUTH DAILY 90 tablet 3    lisinopril (ZESTRIL) 20 MG tablet Take 1 tablet (20 mg) by mouth daily 90 tablet 3    olopatadine (PATADAY) 0.2 % ophthalmic solution Place 1 drop into both eyes daily      ondansetron (ZOFRAN) 4 MG tablet Take 1 tablet (4 mg) by mouth every 8 hours as needed for nausea 40 tablet 1    oxyCODONE (ROXICODONE) 5 MG tablet Take 1 tablet (5 mg) by mouth every 4 hours as needed for pain 60 tablet 0    pioglitazone (ACTOS) 30 MG tablet Take 1 tablet (30 mg) by mouth daily 90 tablet 3    reason aspirin not prescribed, intentional, Please choose reason not prescribed from choices below.       rosuvastatin (CRESTOR) 5 MG tablet Take 1 tablet (5 mg) by mouth daily 90 tablet 3    tirzepatide (MOUNJARO) 10 MG/0.5ML pen Inject 10 mg Subcutaneous every 7 days 2 mL 11    vitamin D3 (CHOLECALCIFEROL) 50 mcg (2000 units) tablet Take 1 tablet (50 mcg) by mouth daily 90 tablet 3     No current facility-administered medications for this visit.     ROS:  4 point ROS including Respiratory, CV, GI and , other than that noted in the HPI,  is negative    Vitals:  BP (!) 153/79   Pulse 65   Temp 97.5  F (36.4  C)   Wt 50.4 kg (111 lb 1.6 oz)   SpO2 100%   BMI 17.93 kg/m    BP 6/8 124/76, 6/7 127/65 (per TCU record)   -249 AM, 140-190 PM  Exam:  GENERAL APPEARANCE:  Alert, in no distress, appears healthy  RESP:  lungs clear to auscultation , no respiratory distress  CV:  regular rate and rhythm, no murmur, rub, or gallop, no edema, +2 pedal pulses  M/S:   +5 strength against resistance of BLE, negative straight leg raise of BLE  NEURO:   Examination of sensation by touch normal    Lab/Diagnostic data:  Recent labs in [facility] reviewed by me today.     ASSESSMENT/PLAN:  1. Compression fracture of L1 vertebra with routine healing, subsequent encounter  Patient is s/p hospitalization for new L1 compression fracture d/t a mechanical fall. The patient shows stable progress and no signs of cauda equina or worsening spinal cord stenosis. She should continue to wear her brace when OOB, participate with therapies, and f/up with [facility] in 1-2 weeks.    2. Generalized pain  Patient reports that her back pain is improving but still mild-moderate. She would like to reduce the amount of oxycodone she requires by adding a muscle relaxer which has helped her in the past. She was educated on the potential sedative properties of Robaxin but was reassured that sedation with continued opioid use is more likely.     -Robaxin 500 mg PO TID prn for pain    3. Type 2 diabetes mellitus with stage 3b chronic kidney disease, with  long-term current use of insulin (H)  The patient's DM is currently poorly controlled considering her most recent A1C was 7.4 and her recent Bgs have been up to 250 in the afternoon (measured via CGM). An appropriate A1C goal for her would be less than 7 as she was at 6.8 10 months ago. She does have a plan to see her primary within the month and will discuss her DM management at that time. She prefers to manager her glucose with diet and exercise over medications.    4. Hypertension, unspecified type  Controlled. The patient did have one high BP reading but her other readings have generally been <140/<90 which is appropriate for her age. She should continue her current medications for BP.      Orders:  Robaxin 500 mg PO TID PRN for pain    Note by Jose Higgins DNP Student     I was present with the student who participated in the service and in the documentation of the note. I have verified the history and personally performed the physical exam and medical decision-making. I agree with the assessment and plan of care as documented in the note.  Electronically signed by:   PEDRO Sena CNP on 6/10/2024 at 4:03 PM

## 2024-06-14 ENCOUNTER — DISCHARGE SUMMARY NURSING HOME (OUTPATIENT)
Dept: GERIATRICS | Facility: CLINIC | Age: 74
End: 2024-06-14
Payer: COMMERCIAL

## 2024-06-14 VITALS
HEART RATE: 64 BPM | TEMPERATURE: 97.2 F | RESPIRATION RATE: 18 BRPM | OXYGEN SATURATION: 99 % | WEIGHT: 109.7 LBS | BODY MASS INDEX: 21.54 KG/M2 | SYSTOLIC BLOOD PRESSURE: 114 MMHG | DIASTOLIC BLOOD PRESSURE: 66 MMHG | HEIGHT: 60 IN

## 2024-06-14 DIAGNOSIS — Z79.4 TYPE 2 DIABETES MELLITUS WITH STAGE 3B CHRONIC KIDNEY DISEASE, WITH LONG-TERM CURRENT USE OF INSULIN (H): ICD-10-CM

## 2024-06-14 DIAGNOSIS — N18.32 STAGE 3B CHRONIC KIDNEY DISEASE (H): ICD-10-CM

## 2024-06-14 DIAGNOSIS — N18.32 TYPE 2 DIABETES MELLITUS WITH STAGE 3B CHRONIC KIDNEY DISEASE, WITH LONG-TERM CURRENT USE OF INSULIN (H): ICD-10-CM

## 2024-06-14 DIAGNOSIS — S32.010S COMPRESSION FRACTURE OF L1 VERTEBRA, SEQUELA: Primary | ICD-10-CM

## 2024-06-14 DIAGNOSIS — E11.22 TYPE 2 DIABETES MELLITUS WITH STAGE 3B CHRONIC KIDNEY DISEASE, WITH LONG-TERM CURRENT USE OF INSULIN (H): ICD-10-CM

## 2024-06-14 DIAGNOSIS — I10 HYPERTENSION, BENIGN ESSENTIAL, GOAL BELOW 140/90: Chronic | ICD-10-CM

## 2024-06-14 PROBLEM — S32.010A: Status: ACTIVE | Noted: 2017-01-06
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
## 2024-06-14 PROCEDURE — 99315 NF DSCHRG MGMT 30 MIN/LESS: CPT | Performed by: NURSE PRACTITIONER

## 2024-06-14 NOTE — LETTER
6/14/2024      Nichole Titus  [address]  [facility] [address]        [facility] GERIATRICS DISCHARGE SUMMARY  PATIENT'S NAME: Nichole Titus  YOB: 1950  MEDICAL RECORD NUMBER:  [id number]  Place of Service where encounter took place:  [facility] ([facility] [[id number]]    PRIMARY CARE PROVIDER AND CLINIC RESPONSIBLE AFTER TRANSFER:   Logan Suazo DO, [address] / [facility] [address]    Non-[facility] Provider     Transferring providers: PEDRO Sena CNP, Judit Whyte MD  Recent Hospitalization/ED:  [facility] stay 6/4/2024 through 6/7/2024..   Date of SNF Admission: June 07, 2024  Date of SNF (anticipated) Discharge: Yoselin 15, 2024  Discharged to: previous independent home      CODE STATUS/ADVANCE DIRECTIVES DISCUSSION:  Full code  ALLERGIES: Amoxicillin, Aspirin, Codeine, Metformin, Prednisone, Simvastatin, Sulfa antibiotics, and Adhesive tape    NURSING FACILITY COURSE   Medication Changes/Rationale:   Sliding scale insulin stopped    Summary of nursing facility stay:   Patient with PMH aortic stenosis s/p AVR 2018, CAD, DM2, CKD3, HTN, vertigo, and hypothyroidism presented to the ED after a fall while walking her dog. She was found to an acute L1 compression fracture as well as probable old fractures of L3, L4, L5, and T12.     Compression fracture of L1 vertebra, sequela  Patient's mobility has improved rapidly. She declines any offer of home therapy or outpatient. Her pain is controlled.     Type 2 diabetes mellitus with stage 3b chronic kidney disease, with long-term current use of insulin (H)  Well controlled    Stage 3b chronic kidney disease (H)  Noted in history. Labs from [facility] were WNL    Hypertension, benign essential, goal below 140/90  Well controlled      Discharge Medications:  Current Outpatient Medications   Medication Sig Dispense Refill     acetaminophen (TYLENOL) 500 MG tablet [ACETAMINOPHEN (TYLENOL) 500 MG TABLET] Take 500 mg by mouth every 6 (six)  hours as needed for pain.       albuterol (PROAIR HFA/PROVENTIL HFA/VENTOLIN HFA) 108 (90 Base) MCG/ACT inhaler Inhale 2 puffs into the lungs every 6 hours as needed 18 g 1     alendronate (FOSAMAX) 70 MG tablet Take 1 tablet (70 mg) by mouth every 7 days 12 tablet 3     blood sugar diagnostic (CONTOUR NEXT STRIPS) Strp [BLOOD SUGAR DIAGNOSTIC (CONTOUR NEXT STRIPS) STRP] Check blood sugars up to 6X/day 180 strip 6     cetirizine (ZYRTEC) 10 MG tablet Take 10 mg by mouth daily       clotrimazole (LOTRIMIN) 1 % vaginal cream Place 1 Applicatorful vaginally at bedtime 45 g 3     clotrimazole-betamethasone (LOTRISONE) 1-0.05 % external cream Apply topically 2 times daily 30 g 1     Continuous Blood Gluc  (FREESTYLE JAVAN 2 READER) GUILLAUME USE TO READ BLOOD SUGAR       Continuous Blood Gluc Sensor (FREESTYLE JAVAN 2 SENSOR) MISC USE 1 EACH EVERY 14 DAYS. USE 1 SENSORE EVERY 14 DAYS.USE TO READ BLOOD SUGARS PER 'S INSTRUCTIONS. 6 each 4     empagliflozin (JARDIANCE) 10 MG TABS tablet Take 1 tablet (10 mg) by mouth daily 90 tablet 3     furosemide (LASIX) 20 MG tablet TAKE 1 TABLET(20 MG) BY MOUTH DAILY 90 tablet 3     gabapentin (NEURONTIN) 100 MG capsule Take 3 capsules (300 mg) by mouth 2 times daily 180 capsule 3     gabapentin (NEURONTIN) 300 MG capsule Take 1 capsule (300 mg) by mouth 2 times daily 180 capsule 3     levothyroxine (SYNTHROID/LEVOTHROID) 112 MCG tablet TAKE 1 TABLET(112 MCG) BY MOUTH DAILY 90 tablet 3     lisinopril (ZESTRIL) 20 MG tablet Take 1 tablet (20 mg) by mouth daily 90 tablet 3     meclizine (ANTIVERT) 25 MG tablet Take 25 mg by mouth 3 times daily as needed for dizziness       methocarbamol (ROBAXIN) 500 MG tablet Take 1 tablet (500 mg) by mouth 3 times daily       olopatadine (PATADAY) 0.2 % ophthalmic solution Place 1 drop into both eyes daily       ondansetron (ZOFRAN) 4 MG tablet Take 1 tablet (4 mg) by mouth every 8 hours as needed for nausea 40 tablet 1     oxyCODONE  (ROXICODONE) 5 MG tablet Take 1 tablet (5 mg) by mouth every 4 hours as needed for pain 60 tablet 0     pioglitazone (ACTOS) 30 MG tablet Take 1 tablet (30 mg) by mouth daily 90 tablet 3     reason aspirin not prescribed, intentional, Please choose reason not prescribed from choices below.       rosuvastatin (CRESTOR) 5 MG tablet Take 1 tablet (5 mg) by mouth daily 90 tablet 3     tirzepatide (MOUNJARO) 10 MG/0.5ML pen Inject 10 mg Subcutaneous every 7 days 2 mL 11     vitamin D3 (CHOLECALCIFEROL) 50 mcg (2000 units) tablet Take 1 tablet (50 mcg) by mouth daily 90 tablet 3        Controlled medications:   OK to send remaining oxycodone     Past Medical History:   Past Medical History:   Diagnosis Date     Chronic kidney disease      Chronic kidney disease, stage III (moderate) (H)      Diabetes (H)      Hypertension      Physical Exam:   Vitals: /66   Pulse 64   Temp 97.2  F (36.2  C)   Resp 18   Ht 1.524 m (5')   Wt 49.8 kg (109 lb 11.2 oz)   SpO2 99%   BMI 21.42 kg/m    BMI: Body mass index is 21.42 kg/m .  GENERAL APPEARANCE:  Alert, in no distress  ENT:  Mouth and posterior oropharynx normal, moist mucous membranes  EYES:  EOM normal, conjunctiva and lids normal  RESP:  no respiratory distress  CV:  no edema  PSYCH:  oriented X 3     SNF labs: none    DISCHARGE PLAN:  Follow up labs: No labs orders/due  Medical Follow Up:      Follow up with primary care provider in 2-3 weeks  Discharge Services: No therapy or home care recommended.       TOTAL DISCHARGE TIME:   Less than or equal to 30 minutes  Electronically signed by:  PEDRO Sena CNP                     Sincerely,        PEDRO Sena CNP

## 2024-06-14 NOTE — PROGRESS NOTES
[facility] GERIATRICS DISCHARGE SUMMARY  PATIENT'S NAME: Nichole Titus  YOB: 1950  MEDICAL RECORD NUMBER:  [id number]  Place of Service where encounter took place:  [facility] ([facility]) [[id number]]    PRIMARY CARE PROVIDER AND CLINIC RESPONSIBLE AFTER TRANSFER:   Logan Suazo DO, [address] [facility] / [facility] [address]    Non-[facility] Provider     Transferring providers: PEDRO Sena CNP, Judit Whyte MD  Recent Hospitalization/ED:  [facility] stay 6/4/2024 through 6/7/2024..   Date of SNF Admission: June 07, 2024  Date of SNF (anticipated) Discharge: Yoselin 15, 2024  Discharged to: previous independent home      CODE STATUS/ADVANCE DIRECTIVES DISCUSSION:  Full code  ALLERGIES: Amoxicillin, Aspirin, Codeine, Metformin, Prednisone, Simvastatin, Sulfa antibiotics, and Adhesive tape    NURSING FACILITY COURSE   Medication Changes/Rationale:   Sliding scale insulin stopped    Summary of nursing facility stay:   Patient with PMH aortic stenosis s/p AVR 2018, CAD, DM2, CKD3, HTN, vertigo, and hypothyroidism presented to the ED after a fall while walking her dog. She was found to an acute L1 compression fracture as well as probable old fractures of L3, L4, L5, and T12.     Compression fracture of L1 vertebra, sequela  Patient's mobility has improved rapidly. She declines any offer of home therapy or outpatient. Her pain is controlled.     Type 2 diabetes mellitus with stage 3b chronic kidney disease, with long-term current use of insulin (H)  Well controlled    Stage 3b chronic kidney disease (H)  Noted in history. Labs from [facility] were WNL    Hypertension, benign essential, goal below 140/90  Well controlled      Discharge Medications:  Current Outpatient Medications   Medication Sig Dispense Refill    acetaminophen (TYLENOL) 500 MG tablet [ACETAMINOPHEN (TYLENOL) 500 MG TABLET] Take 500 mg by mouth every 6 (six) hours as needed for pain.      albuterol (PROAIR HFA/PROVENTIL HFA/VENTOLIN HFA) 108  (90 Base) MCG/ACT inhaler Inhale 2 puffs into the lungs every 6 hours as needed 18 g 1    alendronate (FOSAMAX) 70 MG tablet Take 1 tablet (70 mg) by mouth every 7 days 12 tablet 3    blood sugar diagnostic (CONTOUR NEXT STRIPS) Strp [BLOOD SUGAR DIAGNOSTIC (CONTOUR NEXT STRIPS) STRP] Check blood sugars up to 6X/day 180 strip 6    cetirizine (ZYRTEC) 10 MG tablet Take 10 mg by mouth daily      clotrimazole (LOTRIMIN) 1 % vaginal cream Place 1 Applicatorful vaginally at bedtime 45 g 3    clotrimazole-betamethasone (LOTRISONE) 1-0.05 % external cream Apply topically 2 times daily 30 g 1    Continuous Blood Gluc  (FREESTYLE JAVAN 2 READER) GUILLAUME USE TO READ BLOOD SUGAR      Continuous Blood Gluc Sensor (FREESTYLE JAVAN 2 SENSOR) MISC USE 1 EACH EVERY 14 DAYS. USE 1 SENSORE EVERY 14 DAYS.USE TO READ BLOOD SUGARS PER 'S INSTRUCTIONS. 6 each 4    empagliflozin (JARDIANCE) 10 MG TABS tablet Take 1 tablet (10 mg) by mouth daily 90 tablet 3    furosemide (LASIX) 20 MG tablet TAKE 1 TABLET(20 MG) BY MOUTH DAILY 90 tablet 3    gabapentin (NEURONTIN) 100 MG capsule Take 3 capsules (300 mg) by mouth 2 times daily 180 capsule 3    gabapentin (NEURONTIN) 300 MG capsule Take 1 capsule (300 mg) by mouth 2 times daily 180 capsule 3    levothyroxine (SYNTHROID/LEVOTHROID) 112 MCG tablet TAKE 1 TABLET(112 MCG) BY MOUTH DAILY 90 tablet 3    lisinopril (ZESTRIL) 20 MG tablet Take 1 tablet (20 mg) by mouth daily 90 tablet 3    meclizine (ANTIVERT) 25 MG tablet Take 25 mg by mouth 3 times daily as needed for dizziness      methocarbamol (ROBAXIN) 500 MG tablet Take 1 tablet (500 mg) by mouth 3 times daily      olopatadine (PATADAY) 0.2 % ophthalmic solution Place 1 drop into both eyes daily      ondansetron (ZOFRAN) 4 MG tablet Take 1 tablet (4 mg) by mouth every 8 hours as needed for nausea 40 tablet 1    oxyCODONE (ROXICODONE) 5 MG tablet Take 1 tablet (5 mg) by mouth every 4 hours as needed for pain 60 tablet 0     pioglitazone (ACTOS) 30 MG tablet Take 1 tablet (30 mg) by mouth daily 90 tablet 3    reason aspirin not prescribed, intentional, Please choose reason not prescribed from choices below.      rosuvastatin (CRESTOR) 5 MG tablet Take 1 tablet (5 mg) by mouth daily 90 tablet 3    tirzepatide (MOUNJARO) 10 MG/0.5ML pen Inject 10 mg Subcutaneous every 7 days 2 mL 11    vitamin D3 (CHOLECALCIFEROL) 50 mcg (2000 units) tablet Take 1 tablet (50 mcg) by mouth daily 90 tablet 3        Controlled medications:   OK to send remaining oxycodone     Past Medical History:   Past Medical History:   Diagnosis Date    Chronic kidney disease     Chronic kidney disease, stage III (moderate) (H)     Diabetes (H)     Hypertension      Physical Exam:   Vitals: /66   Pulse 64   Temp 97.2  F (36.2  C)   Resp 18   Ht 1.524 m (5')   Wt 49.8 kg (109 lb 11.2 oz)   SpO2 99%   BMI 21.42 kg/m    BMI: Body mass index is 21.42 kg/m .  GENERAL APPEARANCE:  Alert, in no distress  ENT:  Mouth and posterior oropharynx normal, moist mucous membranes  EYES:  EOM normal, conjunctiva and lids normal  RESP:  no respiratory distress  CV:  no edema  PSYCH:  oriented X 3     SNF labs: none    DISCHARGE PLAN:  Follow up labs: No labs orders/due  Medical Follow Up:      Follow up with primary care provider in 2-3 weeks  Discharge Services: No therapy or home care recommended.       TOTAL DISCHARGE TIME:   Less than or equal to 30 minutes  Electronically signed by:  PEDRO Sena CNP

## 2024-06-21 ENCOUNTER — OFFICE VISIT (OUTPATIENT)
Dept: FAMILY MEDICINE | Facility: CLINIC | Age: 74
End: 2024-06-21
Payer: COMMERCIAL

## 2024-06-21 VITALS
BODY MASS INDEX: 21.52 KG/M2 | TEMPERATURE: 97.5 F | RESPIRATION RATE: 12 BRPM | HEIGHT: 60 IN | WEIGHT: 109.6 LBS | SYSTOLIC BLOOD PRESSURE: 119 MMHG | OXYGEN SATURATION: 100 % | DIASTOLIC BLOOD PRESSURE: 73 MMHG | HEART RATE: 60 BPM

## 2024-06-21 DIAGNOSIS — S32.010S COMPRESSION FRACTURE OF L1 VERTEBRA, SEQUELA: Primary | ICD-10-CM

## 2024-06-21 DIAGNOSIS — M85.88 OSTEOPENIA OF LUMBAR SPINE: ICD-10-CM

## 2024-06-21 PROCEDURE — 99214 OFFICE O/P EST MOD 30 MIN: CPT | Mod: GC

## 2024-06-21 RX ORDER — OXYCODONE HYDROCHLORIDE 5 MG/1
5 TABLET ORAL 2 TIMES DAILY PRN
Qty: 28 TABLET | Refills: 0 | Status: SHIPPED | OUTPATIENT
Start: 2024-06-21

## 2024-06-21 RX ORDER — CALCITONIN SALMON 200 [IU]/.09ML
1 SPRAY, METERED NASAL DAILY
Qty: 3.7 ML | Refills: 0 | Status: SHIPPED | OUTPATIENT
Start: 2024-06-21 | End: 2024-07-23

## 2024-06-21 RX ORDER — INSULIN ASPART 100 [IU]/ML
INJECTION, SOLUTION INTRAVENOUS; SUBCUTANEOUS
COMMUNITY
Start: 2024-06-07 | End: 2024-06-21

## 2024-06-21 RX ORDER — RESPIRATORY SYNCYTIAL VIRUS VACCINE 120MCG/0.5
0.5 KIT INTRAMUSCULAR ONCE
Qty: 1 EACH | Refills: 0 | Status: CANCELLED | OUTPATIENT
Start: 2024-06-21 | End: 2024-06-21

## 2024-06-21 NOTE — PROGRESS NOTES
Assessment & Plan     Compression fracture of L1 vertebra, sequela  Patient appears for follow up 2 weeks after a compression fracture of the L1 vertebra. She currently has a 2 wheel walker, but would like a 4 wheel walker with a seat for better mobility. Patient is currently managing her pain with Tylenol and oxycodone which she takes three times a day. Refilling patient's oxycodone at 2 pills per day for two weeks. Advised patient that we aim to manage her pain with Tylenol which will require her to taper use of the oxycodone as this will be the last refill. Starting patient on calcitonin nasal spray. Follow up in one month   - Walker Order for DME - ONLY FOR DME  - DX Bone Density  - oxyCODONE (ROXICODONE) 5 MG tablet  Dispense: 28 tablet; Refill: 0  - calcitonin, salmon, (MIACALCIN) 200 UNIT/ACT nasal spray  Dispense: 3.7 mL; Refill: 0    DME (Durable Medical Equipment) Orders and Documentation  Orders Placed This Encounter   Procedures    Walker Order for DME - ONLY FOR DME      The patient was assessed and it was determined the patient is in need of the following listed DME Supplies/Equipment. Please complete supporting documentation below to demonstrate medical necessity.      DME All Other Item(s) Documentation    List reason for need and supporting documentation for medical necessity below for each DME item.     1. Patient has a fracture of her L1 vertebrae and is currently wearing a back brace. She has difficulty ambulating without assistance secondary to pain and limited back mobility. She currently has a 2-wheel walker, but it is too cumbersome to use and patient was having difficulty because she has to frequently pick it up. She would benefit from a 4-wheel walker that will slide easier on her floor.     Osteopenia of lumbar spine  Patient has L1 compression fracture and a history of past thoracic compression fracture. A Dexa scan from 2017 revealed osteopenia. Repeat Dexa scan. Patient has also been  on alendronate since 07/2023. Considering holiday from alendronate and potential replacement with prolia or raloxifene.   - DX Bone Density    Return in about 1 month (around 7/21/2024) for Follow up, with me.    Augustin Varela is a 74 year old, presenting for the following health issues:  Other (Had an accident, fractured back and discharged from nursing home)      6/21/2024     8:13 AM   Additional Questions   Roomed by SMA Farida   Accompanied by Daughter         6/21/2024   Forms   Any forms needing to be completed Yes          6/21/2024    Information    services provided? No        Via the Health Maintenance questionnaire, the patient has reported the following services have been completed -Eye Exam: [facility] 2024-02-01, this information has been sent to the abstraction team.    Patient presents 2 weeks after an L1 compression fracture. She is currently managing her pain with tylenol and Oxycodone (3 times/day).She reports that the oxycodone helps her sleep. She presents requesting a refill of the oxycodone. She would also like a walker with 4 wheels and a seat to help me move around better as well as a note stating she will not be working many hours due to the injury.    Patient also discussed needing a lift chair in about 2 months when she moves to a new apartment.    Review of Systems  Constitutional, HEENT, cardiovascular, pulmonary, gi and gu systems are negative, except as otherwise noted.      Objective    /73 (BP Location: Left arm, Patient Position: Sitting, Cuff Size: Adult Regular)   Pulse 60   Temp 97.5  F (36.4  C) (Oral)   Resp 12   Ht 1.524 m (5')   Wt 49.7 kg (109 lb 9.6 oz)   SpO2 100%   BMI 21.40 kg/m    Body mass index is 21.4 kg/m .  Physical Exam  Vitals reviewed.   Constitutional:       General: She is not in acute distress.     Appearance: Normal appearance. She is normal weight. She is not ill-appearing or toxic-appearing.   HENT:      Head:  Normocephalic and atraumatic.      Right Ear: External ear normal.      Left Ear: External ear normal.   Eyes:      Extraocular Movements: Extraocular movements intact.      Conjunctiva/sclera: Conjunctivae normal.   Pulmonary:      Effort: Pulmonary effort is normal. No respiratory distress.   Musculoskeletal:      Comments: Did not assess due to back brace   Neurological:      General: No focal deficit present.      Mental Status: She is alert and oriented to person, place, and time. Mental status is at baseline.      Gait: Gait abnormal.   Psychiatric:         Mood and Affect: Mood normal.         Behavior: Behavior normal.         Thought Content: Thought content normal.         Judgment: Judgment normal.            Sahara Castrejon, MS3  [employer] [employer]    Resident/Fellow Attestation   I, Logan Suazo DO, was present with the medical/JEFFREY student who participated in the service and in the documentation of the note.  I have verified the history and personally performed the physical exam and medical decision making.  I agree with the assessment and plan of care as documented in the note.      Logan Suazo DO  PGY3     Signed Electronically by: Logan Suazo DO

## 2024-06-21 NOTE — LETTER
[facility] [address] [place] [address]  Phone: [phone]  Fax: [phone]    June 21, 2024        Nichole Titus  [address]   [place] [address]          To whom it may concern:    RE: Nichole Titus    Patient was seen and treated today at our clinic. She will no longer be working for the rest of 2024 due to her recent injury, and she should not have an issue staying under the income threshold required by her new apartment.    Please contact me for questions or concerns.      Sincerely,              Logan Suazo, DO

## 2024-06-21 NOTE — PATIENT INSTRUCTIONS
Thank you for coming in to see us today!    - I decreased the oxycodone prescription to 2 times per day for 2 weeks. This will be your last oxycodone prescription for this injury. After it is complete, you pain should be able to be managed with Tylenol and other pain modalities  - Use the calcitonin nasal spray every day in 1 nostril. Alternate nostrils each day.  - I recommend getting a DEXA scan in the near future to see if your alendronate needs to be switched to another medication  - Follow up in 1 month    Logan Suazo, DO

## 2024-06-26 ENCOUNTER — ANCILLARY PROCEDURE (OUTPATIENT)
Dept: MAMMOGRAPHY | Facility: HOSPITAL | Age: 74
End: 2024-06-26
Attending: STUDENT IN AN ORGANIZED HEALTH CARE EDUCATION/TRAINING PROGRAM
Payer: COMMERCIAL

## 2024-06-26 DIAGNOSIS — Z12.31 VISIT FOR SCREENING MAMMOGRAM: ICD-10-CM

## 2024-06-26 PROCEDURE — 77063 BREAST TOMOSYNTHESIS BI: CPT

## 2024-07-05 ENCOUNTER — OFFICE VISIT (OUTPATIENT)
Dept: FAMILY MEDICINE | Facility: CLINIC | Age: 74
End: 2024-07-05
Payer: COMMERCIAL

## 2024-07-05 ENCOUNTER — HOSPITAL ENCOUNTER (OUTPATIENT)
Dept: GENERAL RADIOLOGY | Facility: HOSPITAL | Age: 74
Discharge: HOME | End: 2024-07-05
Attending: FAMILY MEDICINE | Admitting: FAMILY MEDICINE
Payer: COMMERCIAL

## 2024-07-05 VITALS
HEART RATE: 58 BPM | SYSTOLIC BLOOD PRESSURE: 136 MMHG | OXYGEN SATURATION: 99 % | RESPIRATION RATE: 16 BRPM | TEMPERATURE: 97.8 F | DIASTOLIC BLOOD PRESSURE: 71 MMHG

## 2024-07-05 DIAGNOSIS — M79.631 PAIN OF RIGHT FOREARM: Primary | ICD-10-CM

## 2024-07-05 PROCEDURE — 73090 X-RAY EXAM OF FOREARM: CPT | Mod: RT

## 2024-07-05 PROCEDURE — 99213 OFFICE O/P EST LOW 20 MIN: CPT | Performed by: FAMILY MEDICINE

## 2024-07-05 NOTE — PROGRESS NOTES
OUTPATIENT VISIT NOTE                                                   Date of Visit: 7/5/2024     Chief Complaint   Patient presents with:  Arm Pain: Rt arm, almost fell and caught self on the wall with Rt forearm and concern about a fracture, very painful, throbbing and burning pain, no redness or swelling            History of Present Illness   Nichole Titus is a 74 year old female c/o right arm pain.  Hit arm on the corner of wall and then door hit it  Happened about 3 days ago.  Has taken tylenol for it.  Forearm.       MEDICATIONS   Current Outpatient Medications   Medication Sig Dispense Refill    acetaminophen (TYLENOL) 500 MG tablet [ACETAMINOPHEN (TYLENOL) 500 MG TABLET] Take 500 mg by mouth every 6 (six) hours as needed for pain.      albuterol (PROAIR HFA/PROVENTIL HFA/VENTOLIN HFA) 108 (90 Base) MCG/ACT inhaler Inhale 2 puffs into the lungs every 6 hours as needed 18 g 1    alendronate (FOSAMAX) 70 MG tablet Take 1 tablet (70 mg) by mouth every 7 days 12 tablet 3    calcitonin, salmon, (MIACALCIN) 200 UNIT/ACT nasal spray Spray 1 spray into one nostril alternating nostrils daily Alternate nostril each day. 3.7 mL 0    cetirizine (ZYRTEC) 10 MG tablet Take 10 mg by mouth daily      clotrimazole (LOTRIMIN) 1 % vaginal cream Place 1 Applicatorful vaginally at bedtime 45 g 3    clotrimazole-betamethasone (LOTRISONE) 1-0.05 % external cream Apply topically 2 times daily 30 g 1    Continuous Blood Gluc  (FREESTYLE JAVAN 2 READER) GUILLAUME USE TO READ BLOOD SUGAR      Continuous Blood Gluc Sensor (FREESTYLE JAVAN 2 SENSOR) MISC USE 1 EACH EVERY 14 DAYS. USE 1 SENSORE EVERY 14 DAYS.USE TO READ BLOOD SUGARS PER 'S INSTRUCTIONS. 6 each 4    empagliflozin (JARDIANCE) 10 MG TABS tablet Take 1 tablet (10 mg) by mouth daily 90 tablet 3    furosemide (LASIX) 20 MG tablet TAKE 1 TABLET(20 MG) BY MOUTH DAILY 90 tablet 3    gabapentin (NEURONTIN) 300 MG capsule Take 1 capsule (300 mg) by mouth 2 times  daily 180 capsule 3    levothyroxine (SYNTHROID/LEVOTHROID) 112 MCG tablet TAKE 1 TABLET(112 MCG) BY MOUTH DAILY 90 tablet 3    lisinopril (ZESTRIL) 20 MG tablet Take 1 tablet (20 mg) by mouth daily 90 tablet 3    meclizine (ANTIVERT) 25 MG tablet Take 25 mg by mouth 3 times daily as needed for dizziness      methocarbamol (ROBAXIN) 500 MG tablet Take 1 tablet (500 mg) by mouth 3 times daily      olopatadine (PATADAY) 0.2 % ophthalmic solution Place 1 drop into both eyes daily      ondansetron (ZOFRAN) 4 MG tablet Take 1 tablet (4 mg) by mouth every 8 hours as needed for nausea 40 tablet 1    oxyCODONE (ROXICODONE) 5 MG tablet Take 1 tablet (5 mg) by mouth 2 times daily as needed for pain 28 tablet 0    oxyCODONE (ROXICODONE) 5 MG tablet Take 1 tablet (5 mg) by mouth every 4 hours as needed for pain 60 tablet 0    pioglitazone (ACTOS) 30 MG tablet Take 1 tablet (30 mg) by mouth daily 90 tablet 3    reason aspirin not prescribed, intentional, Please choose reason not prescribed from choices below.      rosuvastatin (CRESTOR) 5 MG tablet Take 1 tablet (5 mg) by mouth daily 90 tablet 3    tirzepatide (MOUNJARO) 10 MG/0.5ML pen Inject 10 mg Subcutaneous every 7 days 2 mL 11    vitamin D3 (CHOLECALCIFEROL) 50 mcg (2000 units) tablet Take 1 tablet (50 mcg) by mouth daily 90 tablet 3     No current facility-administered medications for this visit.         SOCIAL HISTORY   Social History     Tobacco Use    Smoking status: Never    Smokeless tobacco: Never   Substance Use Topics    Alcohol use: Yes     Alcohol/week: 1.7 standard drinks of alcohol     Comment: Alcoholic Drinks/day: once a week           Physical Exam   Vitals:    07/05/24 1614   BP: 136/71   Pulse: 58   Resp: 16   Temp: 97.8  F (36.6  C)   TempSrc: Oral   SpO2: 99%        GEN:  NAD   Right arm:  No swelling, erythema or ecchymosis.  Tender along length of ulna.     Diagnostic Studies   LABS:  Results for orders placed or performed in visit on 07/05/24   XR  Forearm Right 2 Views     Status: None    Narrative    EXAM: XR FOREARM RIGHT 2 VIEWS  LOCATION: [facility]  DATE: 7/5/2024    INDICATION: hit right forearm on door and wall. Pain along most of ulna.  COMPARISON: None.      Impression    IMPRESSION: Within normal limits. No fracture.            Assessment and Plan     Pain of right forearm  Contusion.  Ice area.  Recheck for problems.  Visually reviewed xray--no fracture.    - XR Forearm Right 2 Views                 Discussed signs / symptoms that warrant urgent / emergent medical attention.   Recheck if worsening or not improving.       Mindy Sky MD          Pertinent History     The following portions of the patient's history were reviewed and updated as appropriate: allergies, current medications, past family history, past medical history, past social history, past surgical history and problem list.

## 2024-08-15 ENCOUNTER — OFFICE VISIT (OUTPATIENT)
Dept: FAMILY MEDICINE | Facility: CLINIC | Age: 74
End: 2024-08-15
Payer: COMMERCIAL

## 2024-08-15 VITALS
TEMPERATURE: 97.3 F | WEIGHT: 100.6 LBS | HEIGHT: 61 IN | BODY MASS INDEX: 18.99 KG/M2 | OXYGEN SATURATION: 99 % | RESPIRATION RATE: 16 BRPM | DIASTOLIC BLOOD PRESSURE: 85 MMHG | HEART RATE: 53 BPM | SYSTOLIC BLOOD PRESSURE: 125 MMHG

## 2024-08-15 DIAGNOSIS — E11.22 TYPE 2 DIABETES MELLITUS WITH STAGE 3B CHRONIC KIDNEY DISEASE, WITH LONG-TERM CURRENT USE OF INSULIN (H): ICD-10-CM

## 2024-08-15 DIAGNOSIS — S32.010S COMPRESSION FRACTURE OF L1 VERTEBRA, SEQUELA: ICD-10-CM

## 2024-08-15 DIAGNOSIS — M85.88 OSTEOPENIA OF LUMBAR SPINE: ICD-10-CM

## 2024-08-15 DIAGNOSIS — N18.32 TYPE 2 DIABETES MELLITUS WITH STAGE 3B CHRONIC KIDNEY DISEASE, WITH LONG-TERM CURRENT USE OF INSULIN (H): ICD-10-CM

## 2024-08-15 DIAGNOSIS — Z79.4 TYPE 2 DIABETES MELLITUS WITH STAGE 3B CHRONIC KIDNEY DISEASE, WITH LONG-TERM CURRENT USE OF INSULIN (H): ICD-10-CM

## 2024-08-15 DIAGNOSIS — E03.9 HYPOTHYROIDISM, UNSPECIFIED TYPE: Primary | ICD-10-CM

## 2024-08-15 DIAGNOSIS — S22.000A THORACIC COMPRESSION FRACTURE, CLOSED, INITIAL ENCOUNTER (H): ICD-10-CM
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
## 2024-08-15 PROBLEM — R79.89 DECREASED THYROID STIMULATING HORMONE (TSH) LEVEL: Status: RESOLVED | Noted: 2023-01-20 | Resolved: 2024-08-15

## 2024-08-15 LAB
ANION GAP SERPL CALCULATED.3IONS-SCNC: 9 MMOL/L (ref 7–15)
BUN SERPL-MCNC: 17.4 MG/DL (ref 8–23)
CALCIUM SERPL-MCNC: 10 MG/DL (ref 8.8–10.4)
CHLORIDE SERPL-SCNC: 101 MMOL/L (ref 98–107)
CREAT SERPL-MCNC: 0.88 MG/DL (ref 0.51–0.95)
CREAT UR-MCNC: 53.6 MG/DL
EGFRCR SERPLBLD CKD-EPI 2021: 69 ML/MIN/1.73M2
GLUCOSE SERPL-MCNC: 189 MG/DL (ref 70–99)
HBA1C MFR BLD: 7.3 % (ref 0–5.6)
HCO3 SERPL-SCNC: 25 MMOL/L (ref 22–29)
MICROALBUMIN UR-MCNC: <12 MG/L
MICROALBUMIN/CREAT UR: NORMAL MG/G{CREAT}
POTASSIUM SERPL-SCNC: 4.4 MMOL/L (ref 3.4–5.3)
SODIUM SERPL-SCNC: 135 MMOL/L (ref 135–145)
TSH SERPL DL<=0.005 MIU/L-ACNC: 2.12 UIU/ML (ref 0.3–4.2)

## 2024-08-15 PROCEDURE — 99214 OFFICE O/P EST MOD 30 MIN: CPT | Mod: GC

## 2024-08-15 PROCEDURE — 82570 ASSAY OF URINE CREATININE: CPT

## 2024-08-15 PROCEDURE — 82043 UR ALBUMIN QUANTITATIVE: CPT

## 2024-08-15 PROCEDURE — 36415 COLL VENOUS BLD VENIPUNCTURE: CPT

## 2024-08-15 PROCEDURE — 84443 ASSAY THYROID STIM HORMONE: CPT

## 2024-08-15 PROCEDURE — 83036 HEMOGLOBIN GLYCOSYLATED A1C: CPT

## 2024-08-15 PROCEDURE — 80048 BASIC METABOLIC PNL TOTAL CA: CPT

## 2024-08-15 ASSESSMENT — PATIENT HEALTH QUESTIONNAIRE - PHQ9: SUM OF ALL RESPONSES TO PHQ QUESTIONS 1-9: 7

## 2024-08-15 NOTE — PROGRESS NOTES
"Preceptor attestation:  Vital signs reviewed: /85   Pulse 53   Temp 97.3  F (36.3  C) (Oral)   Resp 16   Ht 1.549 m (5' 1\")   Wt 45.6 kg (100 lb 9.6 oz)   SpO2 99%   BMI 19.01 kg/m      Patient seen, evaluated, and discussed with the resident.  I verified the content of the note, which accurately reflects my assessment of the patient and the plan of care.    Supervising physician: Iveth Hewitt MD  [facility]  "

## 2024-08-15 NOTE — PROGRESS NOTES
Assessment & Plan     Hypothyroidism, unspecified type  Patient has a history of hypothyroidism and was recently restarted on her 112 mcg of levothyroxine at her last appointment. She had been out of it for about 10 days prior to her last appointment and her TSH was markedly elevated at 226 on 5/17/24. Prior to that, it had been too low at 0.2. Will recheck a TSH today and make adjustments to her levothyroxine dose, if necessary. Given her weight loss, I'm guessing we will have to decrease the dose.   - TSH    Type 2 diabetes mellitus with stage 3b chronic kidney disease, with long-term current use of insulin (H)  Patient has a history of Type 2 DM that is currently being managed with Jardiance 10 mg daily and Mounjaro injection weekly. Her most recent A1c was 7.4 on 2/16/24. Will recheck A1c today. Patient did note 1 instance where her fasting glucose was >300, which is unusual for her. She notes she ate a bowl of cereal the night before, which she rarely does. She was concerned with this number and gave herself 10U of Novolog, which she is no longer getting prescribed, but still had a supply of at home from a previous prescription. This caused her blood sugar to go low and she tried drinking some juice and eating some sugary snacks to bring her sugar back up and improve her symptoms. I informed her that she no longer needs to use Novolog, and she won't go low if she doesn't use the insulin. No need for glucagon prescription at this time as long as she refrains from using insulin. Recommended treatment with juice and sugary snacks if she experiences any hypoglycemia and/or hypoglycemic symptoms in the future.   - BASIC METABOLIC PANEL  - HEMOGLOBIN A1C  - Albumin Random Urine Quantitative with Creat Ratio    Osteopenia of lumbar spine  Thoracic compression fracture, closed, initial encounter (H)  Compression fracture of L1 vertebra, sequela  Patient has a history of osteopenia and is recovering from vertebral  fracture she suffered after a fall about 2 months ago. Reports she no longer has to wear the brace and she has an upcoming appointment with her spine doctor to determine whether or not she needs formal PT. I had ordered a bone density scan at her last appointment, but she states she was never called about it to have it scheduled. Will re-order today.   - Continue weekly alendronate   - DX Bone Density    Return in about 8 weeks (around 10/10/2024) for Follow up, with me.    Augustin Varela is a 74 year old, presenting for the following health issues:  RECHECK (Follow-up on thyroid)      8/15/2024    10:50 AM   Additional Questions   Roomed by CHINO austin MA   Accompanied by Self         8/15/2024    Information    services provided? No        Patient presents to get her TSH checked today. At her last visit with me, her TSH was markedly elevated, but she had been out of her thyroid medication for about 10 days. She reports she is feeling much better after restarting the thyroid medication.  Patient also reports her back is doing much better since she fell and fractured her vertebra a couple months ago. Reports she no longer needs the brace and will be evaluated by her spine provider in the coming weeks to determine if she needs formal physical therapy. I had ordered a DXA scan for her at her last appointment, but she states she was never called to schedule it.   Also reports 1 elevated blood sugar reading of >300 the other morning, which was out of the ordinary for her. She had eaten a bowl of cereal, which she does not normally do, the night before. She then gave herself 10U Novolog, which she still has at home although it hasn't been prescribed in a while. This caused her blood sugar to drop down to around 60 and she drank some juice and ate some sugary snacks to bring it back up and improve her symptoms. She is wondering if she should have a prescription for glucagon in case this happened  "again.     ROS: 10 point ROS neg other than the symptoms noted above in the HPI.       Objective    /85   Pulse 53   Temp 97.3  F (36.3  C) (Oral)   Resp 16   Ht 1.549 m (5' 1\")   Wt 45.6 kg (100 lb 9.6 oz)   SpO2 99%   BMI 19.01 kg/m    Body mass index is 19.01 kg/m .  Physical Exam  Vitals reviewed.   Constitutional:       General: She is not in acute distress.     Appearance: Normal appearance. She is normal weight. She is not ill-appearing or toxic-appearing.   HENT:      Head: Normocephalic and atraumatic.      Right Ear: External ear normal.      Left Ear: External ear normal.   Eyes:      Extraocular Movements: Extraocular movements intact.      Conjunctiva/sclera: Conjunctivae normal.   Pulmonary:      Effort: Pulmonary effort is normal. No respiratory distress.   Neurological:      Mental Status: She is alert and oriented to person, place, and time. Mental status is at baseline.   Psychiatric:         Mood and Affect: Mood normal.         Behavior: Behavior normal.         Thought Content: Thought content normal.         Judgment: Judgment normal.       Results for orders placed or performed in visit on 08/15/24 (from the past 24 hour(s))   HEMOGLOBIN A1C   Result Value Ref Range    Hemoglobin A1C 7.3 (H) 0.0 - 5.6 %           Signed Electronically by: Logan Suazo DO  "

## 2024-08-15 NOTE — PATIENT INSTRUCTIONS
Thank you for coming in to see us today!    - I will call you with the results of your labs  - Continue to take all medications as instructed  - You do not need to use the Novolog anymore. If you don't use the insulin, you shouldn't need any glucagon  - If your blood sugar ever goes low, drink some juice and eat a sugary snack  - Follow up in 8 weeks for TSH check    Logan Suazo, DO

## 2024-08-28 DIAGNOSIS — Z79.4 DIABETES MELLITUS, TYPE II, INSULIN DEPENDENT (H): ICD-10-CM

## 2024-08-28 DIAGNOSIS — E11.9 DIABETES MELLITUS, TYPE II, INSULIN DEPENDENT (H): ICD-10-CM

## 2024-08-28 RX ORDER — TIRZEPATIDE 10 MG/.5ML
10 INJECTION, SOLUTION SUBCUTANEOUS
Qty: 2 ML | Refills: 11 | Status: SHIPPED | OUTPATIENT
Start: 2024-08-28

## 2024-09-06 ENCOUNTER — VIRTUAL VISIT (OUTPATIENT)
Dept: PHARMACY | Facility: CLINIC | Age: 74
End: 2024-09-06
Payer: COMMERCIAL

## 2024-09-06 DIAGNOSIS — Z79.4 DIABETES MELLITUS, TYPE II, INSULIN DEPENDENT (H): Primary | ICD-10-CM

## 2024-09-06 DIAGNOSIS — I10 ESSENTIAL HYPERTENSION: ICD-10-CM

## 2024-09-06 DIAGNOSIS — M85.88 OSTEOPENIA OF LUMBAR SPINE: ICD-10-CM

## 2024-09-06 DIAGNOSIS — E11.9 DIABETES MELLITUS, TYPE II, INSULIN DEPENDENT (H): Primary | ICD-10-CM

## 2024-09-06 DIAGNOSIS — E78.2 HYPERLIPEMIA, MIXED: ICD-10-CM

## 2024-09-06 DIAGNOSIS — E03.9 HYPOTHYROIDISM, UNSPECIFIED TYPE: ICD-10-CM

## 2024-09-06 PROCEDURE — 99207 PR NO CHARGE LOS: CPT | Mod: 93 | Performed by: PHARMACIST

## 2024-09-06 NOTE — PROGRESS NOTES
Medication Therapy Management (MTM) Encounter    ASSESSMENT:                            Medication Adherence/Access: No issues identified    Diabetes   Meeting A1c goal of <8%.     Hypertension   Meeting blood pressure goal of <130/80.      Hyperlipidemia   Stable.       Hypothyroidism   Stable.  Last TSH was within normal limits.      Osteoporosis:   Stable.  Message sent to referral coordinator to follow up on DXA scan.         PLAN:                            Continue current medications    Follow-up: November with myself and Dr Suazo    SUBJECTIVE/OBJECTIVE:                          Nichole Titus is a 74 year old female seen for a follow-up visit.       Reason for visit: Diabetes follow up.    Allergies/ADRs: Reviewed in chart  Past Medical History: Reviewed in chart  Tobacco: She reports that she has never smoked. She has never used smokeless tobacco.  Social History     Tobacco Use    Smoking status: Never    Smokeless tobacco: Never   Substance Use Topics    Alcohol use: Yes     Alcohol/week: 1.7 standard drinks of alcohol     Comment: Alcoholic Drinks/day: once a week    Drug use: No     Medication Adherence/Access: no issues reported    Diabetes   Jardiance 10 mg tablet once daily   Mounjaro 10 mg once weekly - had to get one fill in [place] recently; refills may have been messed up  Pioglitazone 30 mg once daily  Lantus 10 units once daily at bedtime if blood sugar > 300 - has only done once recently    -no complaints of any side effects including yeast infections, no nausea/vomiting or edema.       Current diabetes symptoms: no complaints  Diet/Exercise: walks every single day with her dog (about a mile to 2 miles every day; more in the summer).   - likes to have a big meal around 2 PM and a smaller meal at 4-5 PM, does not eat after 6 PM. Depends on breakfast. Has low-carb boost drinks for meal/snack supplements.   - Just drinks her coffee with a little bit of milk, no sugar or creamer.     Eye exam is  up to date  Foot exam: up to date    Hypertension   lisinopril 20 mg tablet once daily  Side effects: no complaints of dizzy spells - not since off amlodipine. Haven't checked blood pressure in a while.    BP Readings from Last 3 Encounters:   08/15/24 125/85   07/05/24 136/71   06/21/24 119/73        Hyperlipidemia   Rosuvastatin 5 mg tablet once daily  No complaints of leg cramps, and has been taking every day since the middle of January.        Hypothyroidism   Levothyroxine 112 mcg tablet once daily - dose reduced from 125 mcg on 1/16/24  Biotin supplement and shampoo for hair loss  Otherwise no symptoms.    TSH   Date Value Ref Range Status   08/15/2024 2.12 0.30 - 4.20 uIU/mL Final   01/19/2023 0.15 (L) 0.30 - 5.00 uIU/mL Final          Bone Health   Osteoporosis:   Vitamin D 2000 international unit(s) daily  alendronate (Fosamax) 70mg weekly  Miacalcin nasal spray  Patient is not experiencing side effects.  DEXA History: n/a; has had a recent vertebral fracture; DXA referral was made at last visit with Dr Suazo but patient has not heard anything.   Patient does not get a lot of dietary calcium.  Risk factors: post-menopausal  recent fracture       Today's Vitals: There were no vitals taken for this visit.  ----------------      I spent 18 minutes with this patient today. Dr. Suazo (resident physician) was provided the recommendations above  via routed note and Dr. Hewitt is the authorizing prescriber for this visit through the pharmacist collaborative practice agreement.. A copy of the visit note was provided to the patient's provider(s).    A summary of these recommendations was sent via [employer].    April Almazan PharmD     Telemedicine Visit Details  Type of service:  Telephone visit  Start Time: 9:02 AM  End Time: 9:21 AM     Medication Therapy Recommendations  No medication therapy recommendations to display

## 2024-09-09 DIAGNOSIS — E11.9 DIABETES (H): ICD-10-CM

## 2024-09-11 RX ORDER — ALENDRONATE SODIUM 70 MG/1
70 TABLET ORAL
Qty: 12 TABLET | Refills: 3 | Status: SHIPPED | OUTPATIENT
Start: 2024-09-11

## 2024-09-11 NOTE — TELEPHONE ENCOUNTER
Name from pharmacy: ALENDRONATE 70MG TABLETS          Will file in chart as: alendronate (FOSAMAX) 70 MG tablet    Sig: TAKE 1 TABLET(70 MG) BY MOUTH EVERY 7 DAYS    Disp: 12 tablet    Refills: 3 (Pharmacy requested: Not specified)    Start: 9/9/2024    Class: E-Prescribe    Non-formulary For: Diabetes (H)    Last ordered: 1 year ago (7/24/2023) by Mark Cleveland MD    Last refill: 12/6/2023    Rx #: [id number]    Bisphosphonates [id number]/09/2024 03:16 PM   Protocol Details Dexa scan completed in the past 48-months    Medication indicated for associated diagnosis          Anselmo Pate RN, MSN

## 2024-09-13 ENCOUNTER — TELEPHONE (OUTPATIENT)
Dept: FAMILY MEDICINE | Facility: CLINIC | Age: 74
End: 2024-09-13
Payer: COMMERCIAL

## 2024-09-13 NOTE — TELEPHONE ENCOUNTER
Spoke with pt and assisted with scheduling DEXA scan, transferred call to imaging department .    MANUEL Marie

## 2024-09-18 ENCOUNTER — HOSPITAL ENCOUNTER (OUTPATIENT)
Dept: BONE DENSITY | Facility: HOSPITAL | Age: 74
Discharge: HOME | End: 2024-09-18
Attending: FAMILY MEDICINE | Admitting: FAMILY MEDICINE
Payer: COMMERCIAL

## 2024-09-18 DIAGNOSIS — S22.000A THORACIC COMPRESSION FRACTURE, CLOSED, INITIAL ENCOUNTER (H): ICD-10-CM

## 2024-09-18 DIAGNOSIS — S32.010S COMPRESSION FRACTURE OF L1 VERTEBRA, SEQUELA: ICD-10-CM

## 2024-09-18 DIAGNOSIS — M85.88 OSTEOPENIA OF LUMBAR SPINE: ICD-10-CM

## 2024-09-18 PROCEDURE — 77089 TBS DXA CAL W/I&R FX RISK: CPT

## 2024-09-18 PROCEDURE — 77080 DXA BONE DENSITY AXIAL: CPT

## 2024-09-23 ENCOUNTER — TELEPHONE (OUTPATIENT)
Dept: FAMILY MEDICINE | Facility: CLINIC | Age: 74
End: 2024-09-23

## 2024-09-23 NOTE — TELEPHONE ENCOUNTER
Test Results        Who ordered the test:  MARIANO     Type of test: BONE DENSITY    Date of test:  [id number]    Where was the test performed:  [facility]    What are your questions/concerns?:  RESULTS     Could we [facility] this information to [facility] or would you prefer to receive a phone call?:   Patient would prefer a phone call   Okay to leave a detailed message?: Yes at Cell number on file:    Telephone Information:   Mobile [phone]   Mobile Not on file.       Does patient or caller know when to expect a call? Yes, Nurses will return call within 2-3 business hours.       Caden Rubi on 9/23/2024 at 11:45 AM

## 2024-11-07 ENCOUNTER — TELEPHONE (OUTPATIENT)
Dept: FAMILY MEDICINE | Facility: CLINIC | Age: 74
End: 2024-11-07

## 2024-11-07 ENCOUNTER — OFFICE VISIT (OUTPATIENT)
Dept: FAMILY MEDICINE | Facility: CLINIC | Age: 74
End: 2024-11-07
Payer: COMMERCIAL

## 2024-11-07 VITALS
RESPIRATION RATE: 16 BRPM | TEMPERATURE: 99.4 F | OXYGEN SATURATION: 99 % | WEIGHT: 99.4 LBS | DIASTOLIC BLOOD PRESSURE: 66 MMHG | BODY MASS INDEX: 18.77 KG/M2 | HEIGHT: 61 IN | SYSTOLIC BLOOD PRESSURE: 101 MMHG

## 2024-11-07 DIAGNOSIS — Z79.4 TYPE 2 DIABETES MELLITUS WITH STAGE 3B CHRONIC KIDNEY DISEASE, WITH LONG-TERM CURRENT USE OF INSULIN (H): Primary | ICD-10-CM

## 2024-11-07 DIAGNOSIS — B37.31 RECURRENT CANDIDIASIS OF VAGINA: ICD-10-CM

## 2024-11-07 DIAGNOSIS — E11.22 TYPE 2 DIABETES MELLITUS WITH STAGE 3B CHRONIC KIDNEY DISEASE, WITH LONG-TERM CURRENT USE OF INSULIN (H): Primary | ICD-10-CM

## 2024-11-07 DIAGNOSIS — N18.32 TYPE 2 DIABETES MELLITUS WITH STAGE 3B CHRONIC KIDNEY DISEASE, WITH LONG-TERM CURRENT USE OF INSULIN (H): Primary | ICD-10-CM

## 2024-11-07 DIAGNOSIS — G89.29 CHRONIC LEFT-SIDED LOW BACK PAIN WITHOUT SCIATICA: ICD-10-CM

## 2024-11-07 DIAGNOSIS — E03.9 HYPOTHYROIDISM, UNSPECIFIED TYPE: ICD-10-CM

## 2024-11-07 DIAGNOSIS — M54.50 CHRONIC LEFT-SIDED LOW BACK PAIN WITHOUT SCIATICA: ICD-10-CM

## 2024-11-07 LAB
EST. AVERAGE GLUCOSE BLD GHB EST-MCNC: 194 MG/DL
HBA1C MFR BLD: 8.4 % (ref 0–5.6)
TSH SERPL DL<=0.005 MIU/L-ACNC: 0.56 UIU/ML (ref 0.3–4.2)

## 2024-11-07 PROCEDURE — 36415 COLL VENOUS BLD VENIPUNCTURE: CPT

## 2024-11-07 PROCEDURE — 90480 ADMN SARSCOV2 VAC 1/ONLY CMP: CPT

## 2024-11-07 PROCEDURE — 99214 OFFICE O/P EST MOD 30 MIN: CPT | Mod: 25

## 2024-11-07 PROCEDURE — 84443 ASSAY THYROID STIM HORMONE: CPT

## 2024-11-07 PROCEDURE — 91320 SARSCV2 VAC 30MCG TRS-SUC IM: CPT

## 2024-11-07 PROCEDURE — G0008 ADMIN INFLUENZA VIRUS VAC: HCPCS

## 2024-11-07 PROCEDURE — 90662 IIV NO PRSV INCREASED AG IM: CPT

## 2024-11-07 PROCEDURE — 83036 HEMOGLOBIN GLYCOSYLATED A1C: CPT

## 2024-11-07 RX ORDER — FLUCONAZOLE 150 MG/1
150 TABLET ORAL ONCE
Qty: 1 TABLET | Refills: 3 | Status: SHIPPED | OUTPATIENT
Start: 2024-11-07 | End: 2024-11-07

## 2024-11-07 ASSESSMENT — PATIENT HEALTH QUESTIONNAIRE - PHQ9
10. IF YOU CHECKED OFF ANY PROBLEMS, HOW DIFFICULT HAVE THESE PROBLEMS MADE IT FOR YOU TO DO YOUR WORK, TAKE CARE OF THINGS AT HOME, OR GET ALONG WITH OTHER PEOPLE: NOT DIFFICULT AT ALL
SUM OF ALL RESPONSES TO PHQ QUESTIONS 1-9: 3
SUM OF ALL RESPONSES TO PHQ QUESTIONS 1-9: 3

## 2024-11-07 NOTE — TELEPHONE ENCOUNTER
Nichole returned my call and stated she is not out of her lisinopril and does take it every day.     Zaira Méndez, PharmD, BCPS

## 2024-11-07 NOTE — PATIENT INSTRUCTIONS
Thank you for coming in to see us today!    - I will call you with the results of your labs  - Follow up before you go back to Real Suazo, DO

## 2024-11-07 NOTE — TELEPHONE ENCOUNTER
Attempted to reach Nichole Titus in regards to their lisinopril. Left voicemail, with callback number, requesting return call. Per our records last filled 7/23/24 for 90 day supply and is 17  days late to refill. Has missed 56 days year to date according to prescription refill records.      Zaira Méndez, PharmD, [facility]armacy   [phone]

## 2024-11-07 NOTE — PROGRESS NOTES
Assessment & Plan     Type 2 diabetes mellitus with stage 3b chronic kidney disease, with long-term current use of insulin (H)  Nichole presents today for a DM follow up. She reports compliance with her DM medication regimen and has not had to give the Lantus at night for sugars >300. Has been taking Jardiance, pioglitazone 30 mg daily, and Mounjaro without side effects. Will recheck A1c today.   - Hemoglobin A1c    ADDENDUM: Nichole's A1c came back elevated >8. Called her to notify her of this finding and increased her Jardiance from 10 mg daily to 25 mg daily.     Hypothyroidism, unspecified type  Nichole presents for a hypothyroidism follow up. She was recently restarted on her levothyroxine at 112 mcg and has been compliant with taking it every day. Will check a TSH today.   - TSH with free T4 reflex    Chronic left-sided low back pain without sciatica  Nichole reports she has still been struggling with some left-sided back pain. Reports she received a steroid injection (epidural) in her spine a few years ago that provided great relief and is interested in getting another one done in the near future. Referral placed today. Informed her that due to her recent vertebral fracture, they may have to wait on the injection or get further imaging. She expressed understanding with this plan.   - Spine  Referral    Recurrent candidiasis of vagina  Nichole gets recurrent candidiasis and has noted some recent itching in her vaginal area. She requested a fluconazole prescription and I put some refills on the Rx in the event her symptoms recur.   - fluconazole (DIFLUCAN) 150 MG tablet  Dispense: 1 tablet; Refill: 3    Answers submitted by the patient for this visit:  Patient Health Questionnaire (Submitted on 11/7/2024)  If you checked off any problems, how difficult have these problems made it for you to do your work, take care of things at home, or get along with other people?: Not difficult at all  PHQ9 TOTAL SCORE:  "3    Return in about 2 months (around 1/7/2025) for Follow up, with me.    Subjective   Nichole is a 74 year old, presenting for the following health issues:  Diabetes, Thyroid Problem (Follow up), and Imm/Inj (Can she get both?)    Nichole presents for a DM and hypothyroidism follow up. She reports compliance with her medication regimen.  She also complains of continued, chronic, left-sided low back pain and is wondering if she can get another injection in her back. Her last one was a few years ago and she states it was very helpful.  Nichole also complains of some mild vaginal itching and would like a prescription for fluconazole. She gets these symptoms intermittently and this medication has worked well in the past.     ROS: 10 point ROS neg other than the symptoms noted above in the HPI.       Objective    /66   Temp 99.4  F (37.4  C)   Resp 16   Ht 1.549 m (5' 1\")   Wt 45.1 kg (99 lb 6.4 oz)   SpO2 99%   BMI 18.78 kg/m    Body mass index is 18.78 kg/m .  Physical Exam  Vitals reviewed.   Constitutional:       General: She is not in acute distress.     Appearance: Normal appearance. She is normal weight. She is not ill-appearing or toxic-appearing.   HENT:      Head: Normocephalic and atraumatic.      Right Ear: External ear normal.      Left Ear: External ear normal.   Eyes:      Extraocular Movements: Extraocular movements intact.      Conjunctiva/sclera: Conjunctivae normal.   Pulmonary:      Effort: Pulmonary effort is normal. No respiratory distress.   Neurological:      Mental Status: She is alert and oriented to person, place, and time. Mental status is at baseline.   Psychiatric:         Mood and Affect: Mood normal.         Behavior: Behavior normal.         Thought Content: Thought content normal.         Judgment: Judgment normal.       Results for orders placed or performed in visit on 11/07/24   TSH with free T4 reflex     Status: Normal   Result Value Ref Range    TSH 0.56 0.30 - 4.20 uIU/mL "   Hemoglobin A1c     Status: Abnormal   Result Value Ref Range    Estimated Average Glucose 194 (H) <117 mg/dL    Hemoglobin A1C 8.4 (H) 0.0 - 5.6 %           Signed Electronically by: Logan Suazo DO

## 2024-11-09 DIAGNOSIS — N18.32 TYPE 2 DIABETES MELLITUS WITH STAGE 3B CHRONIC KIDNEY DISEASE, WITH LONG-TERM CURRENT USE OF INSULIN (H): Primary | ICD-10-CM

## 2024-11-09 DIAGNOSIS — Z79.4 TYPE 2 DIABETES MELLITUS WITH STAGE 3B CHRONIC KIDNEY DISEASE, WITH LONG-TERM CURRENT USE OF INSULIN (H): Primary | ICD-10-CM

## 2024-11-09 DIAGNOSIS — E11.22 TYPE 2 DIABETES MELLITUS WITH STAGE 3B CHRONIC KIDNEY DISEASE, WITH LONG-TERM CURRENT USE OF INSULIN (H): Primary | ICD-10-CM

## 2024-11-09 NOTE — PROGRESS NOTES
A1c was elevated. Increased Jardiance from 10 mg to 25 mg daily.    Logan Suazo DO   PGY3  [facility]

## 2024-11-11 NOTE — PROGRESS NOTES
Preceptor Attestation:    I discussed the patient with the resident and evaluated the patient in person. I have verified the content of the note, which accurately reflects my assessment of the patient and the plan of care.   Supervising Physician:  Mark Cleveland MD.

## 2025-01-09 ENCOUNTER — OFFICE VISIT (OUTPATIENT)
Dept: FAMILY MEDICINE | Facility: CLINIC | Age: 75
End: 2025-01-09
Payer: COMMERCIAL

## 2025-01-09 VITALS
SYSTOLIC BLOOD PRESSURE: 116 MMHG | TEMPERATURE: 97.4 F | OXYGEN SATURATION: 100 % | DIASTOLIC BLOOD PRESSURE: 77 MMHG | RESPIRATION RATE: 16 BRPM | WEIGHT: 98.6 LBS | BODY MASS INDEX: 18.63 KG/M2 | HEART RATE: 68 BPM

## 2025-01-09 DIAGNOSIS — I25.119 ATHEROSCLEROSIS OF NATIVE CORONARY ARTERY OF NATIVE HEART WITH ANGINA PECTORIS: ICD-10-CM

## 2025-01-09 DIAGNOSIS — E03.9 HYPOTHYROIDISM, UNSPECIFIED TYPE: Primary | ICD-10-CM

## 2025-01-09 DIAGNOSIS — Z79.4 TYPE 2 DIABETES MELLITUS WITH STAGE 3B CHRONIC KIDNEY DISEASE, WITH LONG-TERM CURRENT USE OF INSULIN (H): ICD-10-CM

## 2025-01-09 DIAGNOSIS — F51.01 PRIMARY INSOMNIA: ICD-10-CM

## 2025-01-09 DIAGNOSIS — N18.32 TYPE 2 DIABETES MELLITUS WITH STAGE 3B CHRONIC KIDNEY DISEASE, WITH LONG-TERM CURRENT USE OF INSULIN (H): ICD-10-CM

## 2025-01-09 DIAGNOSIS — E11.22 TYPE 2 DIABETES MELLITUS WITH STAGE 3B CHRONIC KIDNEY DISEASE, WITH LONG-TERM CURRENT USE OF INSULIN (H): ICD-10-CM

## 2025-01-09 LAB
HGB BLD-MCNC: 15.7 G/DL (ref 11.7–15.7)
TSH SERPL DL<=0.005 MIU/L-ACNC: 0.49 UIU/ML (ref 0.3–4.2)

## 2025-01-09 PROCEDURE — 99214 OFFICE O/P EST MOD 30 MIN: CPT | Mod: GC

## 2025-01-09 PROCEDURE — 84443 ASSAY THYROID STIM HORMONE: CPT

## 2025-01-09 PROCEDURE — 36415 COLL VENOUS BLD VENIPUNCTURE: CPT

## 2025-01-09 PROCEDURE — 85018 HEMOGLOBIN: CPT

## 2025-01-09 RX ORDER — ROSUVASTATIN CALCIUM 5 MG/1
5 TABLET, COATED ORAL DAILY
Qty: 90 TABLET | Refills: 3 | Status: SHIPPED | OUTPATIENT
Start: 2025-01-09

## 2025-01-09 RX ORDER — PIOGLITAZONE 30 MG/1
30 TABLET ORAL DAILY
Qty: 90 TABLET | Refills: 3 | Status: SHIPPED | OUTPATIENT
Start: 2025-01-09

## 2025-01-09 NOTE — PROGRESS NOTES
Assessment & Plan     Hypothyroidism, unspecified type  Nichole presents for a hypothyroidism follow up. She has been taking her 112 mcg levothyroxine as instructed. Last TSH was WNL at 0.56 on 11/7/2024. Will recheck TSH today and make any adjustments to her levothyroxine, if necessary.  - TSH    Type 2 diabetes mellitus with stage 3b chronic kidney disease, with long-term current use of insulin (H)  Nichole has Type 2 DM and has been compliant with her current medication regimen. She needs a refill on Actos and her Arian sensors, which were refilled today. It is too early to check an A1c, but we will get a Hb, as she has CKD associated with her DM.  - Hemoglobin  - pioglitazone (ACTOS) 30 MG tablet  Dispense: 90 tablet; Refill: 3  - Continuous Glucose Sensor (FREESTYLE ARIAN 2 SENSOR) MISC  Dispense: 6 each; Refill: 4    Atherosclerosis of native coronary artery of native heart with angina pectoris  Nichole has a history of atherosclerosis and needs a refill on her statin. This was placed today.   - rosuvastatin (CRESTOR) 5 MG tablet  Dispense: 90 tablet; Refill: 3    Primary insomnia  Nichole endorses insomnia about 50% of the nights during a week. Discussed sleep hygiene, especially eliminating TV about an hour before bed. Also discussed getting up from bed if she is unable to fall asleep and reading or crocheting until she gets tired again. Melatonin 10 mg was ineffective previously. NyQuil is the only thing that helps. I also mentioned CBT for insomnia, and she said she would consider it. However, she has been travelling back and forth from [place] to [place], so this is not feasible with her lifestyle at the moment.     Return in about 2 months (around 3/9/2025) for Follow up, with me.    Subjective   Nichole is a 74 year old, presenting for the following health issues:  Other (Thyroids check )        8/15/2024    10:50 AM   Additional Questions   Roomed by CHINO austin MA   Accompanied by Self     Nichole presents today  for a hypothyroidism follow up. She endorses compliance with her 112 mcg levothyroxine. She also requested a refill on some of her DM medications.   Nichole also reports difficulty falling asleep at night. She does not have an alarm clock in her room, does not have a nightlight and there is no external light coming in her room. She often watches TV or crochets before bed. She has tried melatonin in the past that has been ineffective. States NyQuil is the only thing that has worked thus far. She goes to bed around 10:00-11:00 and wakes up around 8:00. She does not take naps unless she is ill.     ROS: 10 point ROS neg other than the symptoms noted above in the HPI.       Objective    /77   Pulse 68   Temp 97.4  F (36.3  C) (Oral)   Resp 16   Wt 44.7 kg (98 lb 9.6 oz)   SpO2 100%   BMI 18.63 kg/m    Body mass index is 18.63 kg/m .  Physical Exam  Vitals reviewed.   Constitutional:       General: She is not in acute distress.     Appearance: She is not ill-appearing or toxic-appearing.   HENT:      Head: Normocephalic and atraumatic.      Right Ear: External ear normal.      Left Ear: External ear normal.   Eyes:      Extraocular Movements: Extraocular movements intact.      Conjunctiva/sclera: Conjunctivae normal.   Cardiovascular:      Rate and Rhythm: Normal rate.   Pulmonary:      Effort: Pulmonary effort is normal. No respiratory distress.   Neurological:      General: No focal deficit present.      Mental Status: She is alert and oriented to person, place, and time. Mental status is at baseline.   Psychiatric:         Mood and Affect: Mood normal.         Behavior: Behavior normal.         Thought Content: Thought content normal.         Judgment: Judgment normal.          Results for orders placed or performed in visit on 01/09/25 (from the past 24 hours)   Hemoglobin   Result Value Ref Range    Hemoglobin 15.7 11.7 - 15.7 g/dL           Signed Electronically by: Logan Suazo DO

## 2025-01-09 NOTE — PATIENT INSTRUCTIONS
Thank you for coming in to see us today!    - I will let you know if we need to make any adjustments to your thyroid medication  - Follow up in March when you return from [place]    Logan Suazo, DO

## 2025-01-30 ENCOUNTER — TELEPHONE (OUTPATIENT)
Dept: FAMILY MEDICINE | Facility: CLINIC | Age: 75
End: 2025-01-30
Payer: COMMERCIAL

## 2025-01-30 DIAGNOSIS — I25.119 ATHEROSCLEROSIS OF NATIVE CORONARY ARTERY OF NATIVE HEART WITH ANGINA PECTORIS: ICD-10-CM

## 2025-01-30 RX ORDER — ROSUVASTATIN CALCIUM 5 MG/1
5 TABLET, COATED ORAL DAILY
Qty: 100 TABLET | Refills: 3 | Status: SHIPPED | OUTPATIENT
Start: 2025-01-30

## 2025-01-30 NOTE — TELEPHONE ENCOUNTER
Patient has [facility] coverage and with this insurance plan, the patient is eligible to receive certain prescriptions as a 100-day supply at the 90-day supply cost.      Prescriptions updated to 100-day supply per protocol: rosuvastatin      Jessica Beckford, PharmD, [facility]  Population Health Pharmacist  [phone]

## 2025-04-16 ENCOUNTER — OFFICE VISIT (OUTPATIENT)
Dept: URGENT CARE | Facility: URGENT CARE | Age: 75
End: 2025-04-16
Payer: COMMERCIAL

## 2025-04-16 VITALS
HEART RATE: 88 BPM | DIASTOLIC BLOOD PRESSURE: 82 MMHG | TEMPERATURE: 97.5 F | BODY MASS INDEX: 18.25 KG/M2 | OXYGEN SATURATION: 97 % | SYSTOLIC BLOOD PRESSURE: 125 MMHG | RESPIRATION RATE: 14 BRPM | WEIGHT: 96.6 LBS

## 2025-04-16 DIAGNOSIS — J06.9 UPPER RESPIRATORY TRACT INFECTION, UNSPECIFIED TYPE: Primary | ICD-10-CM

## 2025-04-16 DIAGNOSIS — H10.31 ACUTE CONJUNCTIVITIS OF RIGHT EYE, UNSPECIFIED ACUTE CONJUNCTIVITIS TYPE: ICD-10-CM

## 2025-04-16 DIAGNOSIS — J32.9 SINUSITIS, UNSPECIFIED CHRONICITY, UNSPECIFIED LOCATION: ICD-10-CM

## 2025-04-16 PROCEDURE — 3074F SYST BP LT 130 MM HG: CPT | Performed by: PHYSICIAN ASSISTANT

## 2025-04-16 PROCEDURE — 3079F DIAST BP 80-89 MM HG: CPT | Performed by: PHYSICIAN ASSISTANT

## 2025-04-16 PROCEDURE — 99203 OFFICE O/P NEW LOW 30 MIN: CPT | Performed by: PHYSICIAN ASSISTANT

## 2025-04-16 RX ORDER — POLYMYXIN B SULFATE AND TRIMETHOPRIM 1; 10000 MG/ML; [USP'U]/ML
1 SOLUTION OPHTHALMIC EVERY 6 HOURS
Qty: 10 ML | Refills: 0 | Status: SHIPPED | OUTPATIENT
Start: 2025-04-16

## 2025-04-16 RX ORDER — BENZONATATE 200 MG/1
200 CAPSULE ORAL 3 TIMES DAILY PRN
Qty: 30 CAPSULE | Refills: 0 | Status: SHIPPED | OUTPATIENT
Start: 2025-04-16

## 2025-04-16 RX ORDER — DOXYCYCLINE 100 MG/1
100 TABLET ORAL 2 TIMES DAILY
Qty: 14 TABLET | Refills: 0 | Status: SHIPPED | OUTPATIENT
Start: 2025-04-16 | End: 2025-04-23

## 2025-04-16 NOTE — PROGRESS NOTES
Assessment & Plan     Upper respiratory tract infection, unspecified type   Push fluids, rest and ibuprofen or tylenol for comfort.    RTC for persistent or worsening sx.   Has had negative home covid 19 test.   With sx 7 days no value to influenza test   Lungs are clear and pt is vitally normal.     Push fluids, rest and ibuprofen or tylenol for comfort.    RTC for persistent or worsening sx. Tessalon for cough.    At the end of the encounter, I discussed results, diagnosis, medications. Discussed red flags for immediate return to clinic/ER, as well as indications for follow up if no improvement. Patient understood and agreed to plan.         Acute conjunctivitis of right eye, unspecified acute conjunctivitis type  Polytrim as ordered.  May be viral but given unilateral and purulent discharge at home will cover.      - benzonatate (TESSALON) 200 MG capsule  Dispense: 30 capsule; Refill: 0  - polymixin b-trimethoprim (POLYTRIM) 55018-9.1 UNIT/ML-% ophthalmic solution  Dispense: 10 mL; Refill: 0    Sinusitis, unspecified chronicity, unspecified location  If sx perssit greater than 10-14 days may start abx doxycycline.  Will hold off on abx at this time.    - doxycycline monohydrate (ADOXA) 100 MG tablet  Dispense: 14 tablet; Refill: 0             No follow-ups on file.    Janae Kelly PA-C  [facility] [place]    Augustin Varela is a 75 year old female who presents to clinic today for the following health issues:  Chief Complaint   Patient presents with    Eye Problem     Rt eye redness but bilateral eye watery started yesterday.    Cough     On and off cough fit since Saturday.         4/16/2025     1:56 PM   Additional Questions   Roomed by Emir Arreola MA   Accompanied by Self     HPI: pt presents with 1 week hx of uri sx with cough and now red eye.    Eye not itchy, moderate discharge and drainage on the R only.  .    Some sneezing, rhinorrhea, congestion.    ST.  No nausea, vomiting.   Gagging with cough.    No sob, difficulty breathing.    No chest pain.    No fevers but chills off and on .   Does have environmental allergies.    Covid 19 test at home negative.    No swelling in the legs.  No orthopnea, pnd.    No facial pain or tooth pain. Does have some HA.      Review of Systems  Constitutional, HEENT, cardiovascular, pulmonary, gi and gu systems are negative, except as otherwise noted.      Objective    /82   Pulse 88   Temp 97.5  F (36.4  C) (Tympanic)   Resp 14   Wt 43.8 kg (96 lb 9.6 oz)   SpO2 97%   BMI 18.25 kg/m    Physical Exam   Pt is in no acute distress and appears well  Conjunctiva injected on the R. Noninjected on the L.  No current discharge. PERRL  EOMI   Ears patent B:  TM s intact, non-injected. All land marks easily visibile    Nasal mucosa is non-edematous, no discharge.    Pharynx: non erythematous, tonsils non hypertrophied, No exudate   Neck supple: no adenopathy  Lungs: CTA  Heart: RRR, no murmur, no thrills or heaves   Ext: no edema  Skin: no rashes    No TTP of maxillary or frontal sinuses.      No results found for any visits on 04/16/25.

## 2025-04-16 NOTE — PROGRESS NOTES
Urgent Care Clinic Visit    Chief Complaint   Patient presents with    Eye Problem     Rt eye redness but bilateral eye watery started yesterday.    Cough     On and off cough fit since Saturday.                   4/16/2025     1:56 PM   Additional Questions   Roomed by Emir Arreola MA   Accompanied by Self       Emir Arreola MA on 4/16/2025 at 1:57 PM

## 2025-05-18 ENCOUNTER — HEALTH MAINTENANCE LETTER (OUTPATIENT)
Age: 75
End: 2025-05-18

## 2025-06-29 ENCOUNTER — HEALTH MAINTENANCE LETTER (OUTPATIENT)
Age: 75
End: 2025-06-29